# Patient Record
Sex: FEMALE | Race: WHITE | HISPANIC OR LATINO | Employment: OTHER | URBAN - METROPOLITAN AREA
[De-identification: names, ages, dates, MRNs, and addresses within clinical notes are randomized per-mention and may not be internally consistent; named-entity substitution may affect disease eponyms.]

---

## 2017-04-17 ENCOUNTER — OFFICE VISIT (OUTPATIENT)
Dept: INTERNAL MEDICINE | Facility: MEDICAL CENTER | Age: 57
End: 2017-04-17
Payer: MEDICAID

## 2017-04-17 VITALS
BODY MASS INDEX: 30.36 KG/M2 | TEMPERATURE: 98.3 F | HEART RATE: 83 BPM | SYSTOLIC BLOOD PRESSURE: 137 MMHG | DIASTOLIC BLOOD PRESSURE: 84 MMHG | OXYGEN SATURATION: 94 % | HEIGHT: 61 IN | WEIGHT: 160.8 LBS

## 2017-04-17 DIAGNOSIS — L65.9 HAIR LOSS: ICD-10-CM

## 2017-04-17 DIAGNOSIS — M75.02 ADHESIVE CAPSULITIS OF BOTH SHOULDERS: ICD-10-CM

## 2017-04-17 DIAGNOSIS — F39 MOOD DISORDER (HCC): ICD-10-CM

## 2017-04-17 DIAGNOSIS — R07.89 ATYPICAL CHEST PAIN: ICD-10-CM

## 2017-04-17 DIAGNOSIS — Z00.00 ROUTINE ADULT HEALTH MAINTENANCE: ICD-10-CM

## 2017-04-17 DIAGNOSIS — E78.5 DYSLIPIDEMIA: ICD-10-CM

## 2017-04-17 DIAGNOSIS — M75.01 ADHESIVE CAPSULITIS OF BOTH SHOULDERS: ICD-10-CM

## 2017-04-17 DIAGNOSIS — E11.8 TYPE 2 DIABETES MELLITUS WITH COMPLICATION, WITHOUT LONG-TERM CURRENT USE OF INSULIN (HCC): ICD-10-CM

## 2017-04-17 DIAGNOSIS — I10 ESSENTIAL HYPERTENSION: ICD-10-CM

## 2017-04-17 DIAGNOSIS — L20.89 FLEXURAL ATOPIC DERMATITIS: ICD-10-CM

## 2017-04-17 PROBLEM — L30.9 DERMATITIS: Status: ACTIVE | Noted: 2017-04-17

## 2017-04-17 PROBLEM — K29.50 CHRONIC GASTRITIS: Status: ACTIVE | Noted: 2017-04-17

## 2017-04-17 PROBLEM — E66.9 OBESITY: Status: ACTIVE | Noted: 2017-04-17

## 2017-04-17 PROBLEM — L20.9 ATOPIC DERMATITIS: Status: ACTIVE | Noted: 2017-04-17

## 2017-04-17 PROBLEM — F32.9 MAJOR DEPRESSIVE DISORDER: Status: ACTIVE | Noted: 2017-04-17

## 2017-04-17 PROBLEM — E11.9 TYPE 2 DIABETES MELLITUS (HCC): Status: ACTIVE | Noted: 2017-04-17

## 2017-04-17 PROBLEM — Z87.19 HISTORY OF GASTRITIS: Status: ACTIVE | Noted: 2017-04-17

## 2017-04-17 PROCEDURE — 99204 OFFICE O/P NEW MOD 45 MIN: CPT | Mod: GC | Performed by: INTERNAL MEDICINE

## 2017-04-17 RX ORDER — ATORVASTATIN CALCIUM 20 MG/1
20 TABLET, FILM COATED ORAL NIGHTLY
COMMUNITY
End: 2017-04-17 | Stop reason: SDUPTHER

## 2017-04-17 RX ORDER — CLOBETASOL PROPIONATE 0.5 MG/G
CREAM TOPICAL
Qty: 1 TUBE | Refills: 1 | Status: SHIPPED | OUTPATIENT
Start: 2017-04-17 | End: 2017-04-20 | Stop reason: CLARIF

## 2017-04-17 RX ORDER — ATORVASTATIN CALCIUM 20 MG/1
20 TABLET, FILM COATED ORAL DAILY
Qty: 30 TAB | Refills: 3 | Status: SHIPPED | OUTPATIENT
Start: 2017-04-17 | End: 2017-09-17 | Stop reason: SDUPTHER

## 2017-04-17 RX ORDER — METOPROLOL SUCCINATE 25 MG/1
25 TABLET, EXTENDED RELEASE ORAL DAILY
COMMUNITY
End: 2017-04-17 | Stop reason: SDUPTHER

## 2017-04-17 RX ORDER — ASPIRIN 81 MG/1
81 TABLET, CHEWABLE ORAL DAILY
COMMUNITY
End: 2017-04-17 | Stop reason: SDUPTHER

## 2017-04-17 RX ORDER — CETIRIZINE HYDROCHLORIDE 10 MG/1
10 TABLET ORAL DAILY
COMMUNITY
End: 2017-08-17

## 2017-04-17 RX ORDER — METOPROLOL SUCCINATE 25 MG/1
25 TABLET, EXTENDED RELEASE ORAL DAILY
Qty: 30 TAB | Refills: 3 | Status: ON HOLD | OUTPATIENT
Start: 2017-04-17 | End: 2018-04-25

## 2017-04-17 RX ORDER — ASPIRIN 81 MG/1
81 TABLET, CHEWABLE ORAL DAILY
Qty: 100 TAB | Refills: 3 | Status: SHIPPED | OUTPATIENT
Start: 2017-04-17 | End: 2017-10-24 | Stop reason: SDUPTHER

## 2017-04-17 RX ORDER — PANTOPRAZOLE SODIUM 40 MG/1
40 TABLET, DELAYED RELEASE ORAL DAILY
COMMUNITY
End: 2017-11-13

## 2017-04-17 RX ORDER — IBUPROFEN 800 MG/1
800 TABLET ORAL EVERY 8 HOURS PRN
COMMUNITY
End: 2017-05-31

## 2017-04-17 RX ORDER — ALBUTEROL SULFATE 90 UG/1
2 AEROSOL, METERED RESPIRATORY (INHALATION) EVERY 6 HOURS PRN
COMMUNITY
End: 2018-06-20 | Stop reason: SDUPTHER

## 2017-04-17 RX ORDER — LISINOPRIL 10 MG/1
10 TABLET ORAL DAILY
COMMUNITY
End: 2017-08-17

## 2017-04-17 ASSESSMENT — PAIN SCALES - GENERAL: PAINLEVEL: 7=MODERATE-SEVERE PAIN

## 2017-04-17 ASSESSMENT — PATIENT HEALTH QUESTIONNAIRE - PHQ9: CLINICAL INTERPRETATION OF PHQ2 SCORE: 0

## 2017-04-17 NOTE — PROGRESS NOTES
New Patient to Establish    Reason to establish: PCP switch    CC:  Unstable mood, hair loss, med refill    HPI:   A 55 yo F with PMHx of NIDDM2, HTN, DLD, came into clinic to establish care.  She recently moved from [place]. Last seen her PCP in Nov, 2016.     1. Mood disorder (CMS-HCC)  For past 4-5 months, fluctuating mood, depressed and anxious. Disturbed with sleep, appetite, gained a few lbs and having low energy. Tried xanax for a week previously, could not tolerate it. She partly contribute her mood symptoms to excessive hair loss, stressing her out. Willing to try medication. Denies suicidal ideation.     2. Hair loss  - diffuse, same duration as her mood symptom, 4-5 months.     3. Type 2 diabetes mellitus with complication, without long-term current use of insulin ([facility])  Last HbA1 C was said to be 7.6 six months ago. On janumet  mg QAM, metformin 1000 mg QAM and jardiance 25 mg QAM. Need refill of janumet.    4. Dyslipidemia  Need refill of atorvastatin      5. Essential hypertension  Office /80s. At home, usually runs between -150s, relatively controlled. On lisinopril 10 mg qd and metoprolol XR 25 mg qd.     6. Atypical chest pain  She had atypical chest pain, sharp left sided pain with ambulation. Saw cardiology in the past and work ups were said to be neg (stress test 2 yrs ago neg, cardiac monitor x 5 days was unremarkable)     7. Flexural atopic dermatitis  Has itchiness and rashes in her left forearm for years. Did not respond to hydrocortisone cream, currently on clobetasol with partial response.   Taking cetirizine for itchiness. Wears bracelets, tried taking them off for months without any relief.     8. Adhesive capsulitis of both shoulders  Currently full ROM. Reluctant to move around due to occasional pain.      ROS:   12 systems reviewed. Negative except stated above.     Patient Active Problem List    Diagnosis Date Noted   • Type 2 diabetes mellitus (HCC) 04/17/2017    • Essential hypertension 04/17/2017   • Dyslipidemia 04/17/2017   • History of gastritis 04/17/2017   • Atypical chest pain 04/17/2017   • Obesity 04/17/2017   • Mood disorder (CMS-HCC) 04/17/2017   • Atopic dermatitis 04/17/2017   • Hair loss 04/17/2017   • Adhesive capsulitis of both shoulders 04/17/2017       Past Medical History   Diagnosis Date   • Diabetes (CMS-HCC)    • Depression    • Cushings syndrome (CMS-HCC)    • Gastritis    • Atopic dermatitis        Current Outpatient Prescriptions   Medication Sig Dispense Refill   • lisinopril (PRINIVIL) 10 MG Tab Take 10 mg by mouth every day.     • albuterol 108 (90 BASE) MCG/ACT Aero Soln inhalation aerosol Inhale 2 Puffs by mouth every 6 hours as needed for Shortness of Breath.     • pantoprazole (PROTONIX) 40 MG Tablet Delayed Response Take 40 mg by mouth every day.     • metformin (GLUCOPHAGE) 500 MG Tab Take 500 mg by mouth 2 times a day, with meals.     • cetirizine (ZYRTEC) 10 MG Tab Take 10 mg by mouth every day.     • Empagliflozin (JARDIANCE) 25 MG Tab Take  by mouth.     • ibuprofen (MOTRIN) 800 MG Tab Take 800 mg by mouth every 8 hours as needed.     • sitagliptan-metformin (JANUMET)  MG per tablet Take 1 Tab by mouth every morning. 30 Tab 3   • metoprolol SR (TOPROL XL) 25 MG TABLET SR 24 HR Take 1 Tab by mouth every day. 30 Tab 3   • aspirin (ASA) 81 MG Chew Tab chewable tablet Take 1 Tab by mouth every day. 100 Tab 3   • atorvastatin (LIPITOR) 20 MG Tab Take 1 Tab by mouth every day. 30 Tab 3   • clobetasol (TEMOVATE) 0.05 % Cream Apply to itchy area at left forearm twice daily. 1 Tube 1     No current facility-administered medications for this visit.       Allergies as of 04/17/2017   • (No Known Allergies)       Social History     Social History   • Marital Status: Legally      Spouse Name: N/A   • Number of Children: N/A   • Years of Education: N/A     Occupational History   • Not on file.     Social History Main Topics   •  "Smoking status: Former Smoker -- 1.00 packs/day for 15 years     Quit date: 04/17/2002   • Smokeless tobacco: Never Used   • Alcohol Use: No      Comment: 1 beer / month   • Drug Use: No   • Sexual Activity: Not on file      Comment: menopause 2015     Other Topics Concern   • Not on file     Social History Narrative   • No narrative on file       Family History   Problem Relation Age of Onset   • Cancer Mother      lung cancer, chronic smoker   • Lung Disease Mother    • Diabetes Father        Past Surgical History   Procedure Laterality Date   • Cholecystectomy  2007   • Other orthopedic surgery  2007     shoulder surgery         /84 mmHg  Pulse 83  Temp(Src) 36.8 °C (98.3 °F)  Ht 1.549 m (5' 0.98\")  Wt 72.938 kg (160 lb 12.8 oz)  BMI 30.40 kg/m2  SpO2 94%  Breastfeeding? No    Physical Exam  General: Alert and oriented, No apparent distress.  Eyes: Pupils are equal and reactive. No scleral icterus.  Throat: Clear no erythema or exudates noted.  Neck: Supple. No lymphadenopathy noted. Thyroid not enlarged.  Lungs: Clear to auscultation bilaterally without any wheezing, crepitations.  Cardiovascular: Regular rate and rhythm. No murmurs, rubs or gallops.  Abdomen: Bowel sound +, soft, non tender, no rebound or guarding, no palpable organomegaly  Extremities: No clubbing, cyanosis, edema.  Skin: 1 cm raised chronic appearing erythematous lesion just above flexor surface of left wrist.  Neuro: A & O x 3. Normal speech and memory. Motor and sensory grossly normal.     Assessment and Plan    1. Mood disorder (CMS-HCC)  For past 4-5 months, fluctuating mood, depressed and anxious. Disturbed with sleep, appetite, gained a few lbs and having low energy. Tried xanax for a week previously, could not tolerate it. She partly contribute her mood symptoms to excessive hair loss, stressing her out. Willing to try medication. Denies suicidal ideation.   - explained her that we'll rule out treatable medical condition " first (especially with hair loss) and will have her follow up to consider behavioral therapy or medication in 2 weeks.   - TSH WITH REFLEX TO FT4; Future    2. Hair loss  - same duration as her mood symptom, 4-5 months, generalized.   - TSH WITH REFLEX TO FT4; Future    3. Type 2 diabetes mellitus with complication, without long-term current use of insulin (HCC)  Last HbA1 C was said to be 7.6 six months ago. On janumet  mg QAM, metformin 1000 mg QAM and jardiance 25 mg QAM. Need refill of janumet, refilled. Continue current therapy and recheck Hb A 1C.  - MICROALBUMIN CREAT RATIO URINE; Future  - HEMOGLOBIN A1C; Future    4. Dyslipidemia  Need refill of atorvastatin  - ordered LIPID PROFILE and plan increase to mod-high intensity as tolerated.  - refilled atorvastatin (LIPITOR) 20 MG Tab; Take 1 Tab by mouth every day.  Dispense: 30 Tab; Refill: 3    5. Essential hypertension  Office /80s. At home, usually runs between -150s, relatively controlled.On lisinopril 10 mg qd and metoprolol XR 25 mg qd.   - Continue current therapy.  - refilled metoprolol SR (TOPROL XL) 25 MG TABLET SR 24 HR; Take 1 Tab by mouth every day.  Dispense: 30 Tab; Refill: 3    6. Atypical chest pain  She had atypical chest pain, sharp left sided pain with ambulation. Saw cardiology in the past and work ups were said to be neg (stress test 2 yrs ago neg, cardiac monitor x 5 days was unremarkable)   - If the nature of symptoms change, will repeat the work up. Monitor for now.  - aspirin (ASA) 81 MG Chew Tab chewable tablet; Take 1 Tab by mouth every day.  Dispense: 100 Tab; Refill: 3    7. Flexural atopic dermatitis  Has itchiness and rashes in her left forearm for years. Did not respond to hydrocortisone cream, currently on clobetasol with partial response.   Taking cetirizine for itchiness. Wears bracelets, tried taking them off for months without any relief.   Exam: 1 cm raised chronic appearing erythematous lesion just  above flexor surface of left wrist.  - counseled not to wear bracelets on affected side since it will increase irritation.   - refill clobetasol (TEMOVATE) 0.05 % Cream; Apply to itchy area at left forearm twice daily.  Dispense: 1 Tube; Refill: 1    8. Adhesive capsulitis of both shoulders  Currently full ROM. Reluctant to move around due to occasional pain.  - Recommended exercise to keep mobility. Instructions for exercise given.     9. Routine adult health maintenance    Preventive care  Flu - NA  TD- will update in next visit.  TDaP - NA  Pneumococcal - will update in next visit.  Prevnar - NA  Colonoscopy - 2 years ago, said to be normal except for hemorroids  Zostavax- NA     Women only  Pap - 2/2017, said to be normal. Followed by gyne.  Mammogram - 2/2017, said to be normal. Followed by gyne.  Dexa - not due.     - CBC WITH DIFFERENTIAL; Future  - COMP METABOLIC PANEL; Future      Followup: Return in about 2 weeks (around 5/1/2017).      Signed by: Lola Gomez M.D.

## 2017-04-17 NOTE — MR AVS SNAPSHOT
"        Shahida Price   2017 8:45 AM   Office Visit   MRN: [id number]    Department:  [facility] Med - Internal Med   Dept Phone:  [phone]    Description:  Female : 1960   Provider:  Lola Gomez M.D.           Reason for Visit     New Patient DM       Allergies as of 2017     No Known Allergies      You were diagnosed with     Mild single current episode of major depressive disorder (CMS-HCC)   [8869053]       Type 2 diabetes mellitus with complication, without long-term current use of insulin (CMS-HCC)   [5768275]       Essential hypertension   [8731780]       Dyslipidemia   [183903]       Chronic gastritis without bleeding, unspecified gastritis type   [3874823]       Atypical chest pain   [899030]       Hair loss   [898649]       Flexural atopic dermatitis   [451618]       Routine adult health maintenance   [918393]       Adhesive capsulitis of both shoulders   [2019409]         Vital Signs     Blood Pressure Pulse Temperature Height Weight Body Mass Index    137/84 mmHg 83 36.8 °C (98.3 °F) 1.549 m (5' 0.98\") 72.938 kg (160 lb 12.8 oz) 30.40 kg/m2    Oxygen Saturation Breastfeeding? Smoking Status             94% No Never Smoker          Basic Information     Date Of Birth Sex Race Ethnicity Preferred Language    1960 Female Patient Refused  Origin (Czech,Israeli,Moldovan,Chase, etc) Czech      Your appointments     May 05, 2017  9:00 AM   Established Patient with Annie Contreras M.D.   [facility] - Internal Medicine (--)    [address] [place] [id number] [phone]           You will be receiving a confirmation call a few days before your appointment from our automated call confirmation system.              Problem List              ICD-10-CM Priority Class Noted - Resolved    Type 2 diabetes mellitus (HCC) E11.9   2017 - Present    Essential hypertension I10   2017 - Present    Dyslipidemia E78.5   2017 - Present    Chronic " gastritis K29.50   4/17/2017 - Present    Atypical chest pain R07.89   4/17/2017 - Present    Obesity E66.9   4/17/2017 - Present    Dermatitis L30.9   4/17/2017 - Present    Major depressive disorder F32.9   4/17/2017 - Present    Atopic dermatitis L20.9   4/17/2017 - Present    Hair loss L65.9   4/17/2017 - Present    Adhesive capsulitis of both shoulders M75.01, M75.02   4/17/2017 - Present      Health Maintenance     Patient has no pending health maintenance at this time      Current Immunizations     No immunizations on file.      Below and/or attached are the medications your provider expects you to take. Review all of your home medications and newly ordered medications with your provider and/or pharmacist. Follow medication instructions as directed by your provider and/or pharmacist. Please keep your medication list with you and share with your provider. Update the information when medications are discontinued, doses are changed, or new medications (including over-the-counter products) are added; and carry medication information at all times in the event of emergency situations     Allergies:  No Known Allergies          Medications  Valid as of: April 17, 2017 - 10:33 AM    Generic Name Brand Name Tablet Size Instructions for use    Albuterol Sulfate (Aero Soln) albuterol 108 (90 BASE) MCG/ACT Inhale 2 Puffs by mouth every 6 hours as needed for Shortness of Breath.        Aspirin (Chew Tab) ASA 81 MG Take 1 Tab by mouth every day.        Atorvastatin Calcium (Tab) LIPITOR 20 MG Take 1 Tab by mouth every day.        Cetirizine HCl (Tab) ZYRTEC 10 MG Take 10 mg by mouth every day.        Clobetasol Propionate (Cream) TEMOVATE 0.05 % Apply to itchy area at left forearm twice daily.        Empagliflozin (Tab) Empagliflozin 25 MG Take  by mouth.        Ibuprofen (Tab) MOTRIN 800 MG Take 800 mg by mouth every 8 hours as needed.        Lisinopril (Tab) PRINIVIL 10 MG Take 10 mg by mouth every day.        MetFORMIN  HCl (Tab) GLUCOPHAGE 500 MG Take 500 mg by mouth 2 times a day, with meals.        Metoprolol Succinate (TABLET SR 24 HR) TOPROL XL 25 MG Take 1 Tab by mouth every day.        Pantoprazole Sodium (Tablet Delayed Response) PROTONIX 40 MG Take 40 mg by mouth every day.        SITagliptin-MetFORMIN HCl (Tab) JANUMET  MG Take 1 Tab by mouth every morning.        .                 Medicines prescribed today were sent to:     [facility] [address] [place] [address] [facility]    [address] [facility] [place] [address]    Phone: [phone] Fax: [phone]    Open 24 Hours?: No      Medication refill instructions:       If your prescription bottle indicates you have medication refills left, it is not necessary to call your provider’s office. Please contact your pharmacy and they will refill your medication.    If your prescription bottle indicates you do not have any refills left, you may request refills at any time through one of the following ways: The online [employer] system (except Urgent Care), by calling your provider’s office, or by asking your pharmacy to contact your provider’s office with a refill request. Medication refills are processed only during regular business hours and may not be available until the next business day. Your provider may request additional information or to have a follow-up visit with you prior to refilling your medication.   *Please Note: Medication refills are assigned a new Rx number when refilled electronically. Your pharmacy may indicate that no refills were authorized even though a new prescription for the same medication is available at the pharmacy. Please request the medicine by name with the pharmacy before contacting your provider for a refill.        Your To Do List     Future Labs/Procedures Complete By Expires    CBC WITH DIFFERENTIAL  As directed 4/18/2018    COMP METABOLIC PANEL  As directed 4/18/2018    HEMOGLOBIN A1C   "As directed 4/18/2018    LIPID PROFILE  As directed 4/18/2018    MICROALBUMIN CREAT RATIO URINE  As directed 4/18/2018    TSH WITH REFLEX TO FT4  As directed 4/17/2018      Instructions    1. Return in about 2 weeks (around 5/1/2017).     .Capsulitis adhesiva   (Adhesive Capsulitis)   A veces, el hombro se vuelve más rígido y duele al moverlo. Algunas personas dicen que se siente juan si el hombro estuviese congelado en el lugar. Debido a esto, la enfermedad se llama \"hombro congelado\". Kang nombre médico es capsulitis adhesiva.   La articulación del hombro está formada por un antoinette tejido conectivo que une la carl del húmero en la cavidad hueca del hombro. Dulce tejido conectivo antoinette se llama cápsula articular. Dulce tejido puede volverse rígido e hinchado. Es entonces cuando se produce la capsulitis adhesiva.   CAUSAS   No siempre está darnell qué es lo que ocasiona la capsulitis adhesiva. Las posibilidades incluyen:   · Lesión de la articulación del hombro.  · Distensión. Se trata de kaur lesión repetitiva provocada por el uso excesivo.  · Falta de uso. Kaur lesión del brazo o de la mano. Polo usado latrice tiempo un cabestrillo. O eddi vez no haberlo utilizado para evitar el dolor.  · Dolor localizado. Esta es kaur especie de trampa que hace el cuerpo. Siente dolor en el hombro. Sue, el dolor en realidad proviene de kaur lesión de otra parte del cuerpo.  · Otros problemas de harjit crónicos. Varias enfermedades pueden causar capsulitis adhesiva. Entre ellas se encuentran la diabetes, las enfermedades cardíacas, ictus, problemas de tiroides, la artritis reumatoidea y enfermedad pulmonar.  · Ser kaur johnson mayor de 40 años. Cualquier persona puede desarrollar capsulitis adhesiva, sue es más común en las mujeres en dulce eugenia de edad.  SÍNTOMAS   · Dolor.  · Se produce al  el brazo.  · Kaur parte del hombro puede doler al tocarla.  · El dolor es peor en la noche o cuando está descansando.  · Malestar. Puede que no sea " lo suficientemente antoinette juan para ser llamado dolor. Sue el hombro siente molestias.  · El hombro no se mueve libremente.  · Espasmos musculares.  · Dificultad para dormir debido a molestias o dolor en el hombro.  DIAGNÓSTICO   Para diagnosticar la capsulitis adhesiva, el médico hará lo siguiente:   · Preguntará sobre los síntomas que haya observado.  · Preguntará acerca de christie historia de dolor en las articulaciones y cualquier cosa que pudiera marilyn causado el dolor.  · Preguntará acerca de christie harjit en general.  · Utilizará las chetan para palpar el hombro y el wicho.  · Le pedirá que lo mueva en direcciones específicas. [place] podrá indicar el origen del dolor.  · Podrá pedir un diagnóstico por imágenes, imágenes del hombro. Estas ayudan a identificar la pennie del problema. Podrán utilizarse rc X. Para más detalles, a menudo se solicita eleuterio resonancia magnética. Eleuterio resonancia magnética muestras los tendones, músculos y ligamentos, así juan las articulaciones.  TRATAMIENTO   La capsulitis adhesiva puede tratarse de varias maneras. La mayoría de las veces, el tratamiento se puede realizar en eleuterio clínica o en el consultorio de un médico. Asegúrese de comentar las diferentes opciones con christie médico. Ellos son:   · Fisioterapia. Trabajar en ejercicios específicos para recuperar la movilidad del hombro. Los ejercicios suelen involucrar estiramientos. Un fisioterapeuta (un personal de harjit con capacitación especial) puede enseñarle qué hacer y qué no hacer. Deberá realizar estos ejercicios todos los días.  · Medicamentos.  · Medicamentos de venta antonia para aliviar el dolor y la inflamación (la forma del cuerpo de reaccionar a eleuterio lesión o infección).  · Corticoides. Estos son medicamentos más [place] para reducir el dolor y la inflamación. Se administran por inyección (vacunas) en la articulación del hombro. No se recomienda el tratamiento frecuente.  · Relajantes musculares. Podrán recetarle medicamentos para  aliviar los espasmos musculares.  · Tratamiento de las enfermedades subyacentes. [place] significa tratar otra afección que está causando christie problema del hombro. Paola podría ser un problema del manguito rotador (tendón).  · Manipulación del hombro. El médico podrá realizar movimientos del hombro. Usted estará bajo anestesia general (se le administrará eleuterio droga para dormirlo). No sentirá nada. A veces se inyecta agua salada (solución salina) en la articulación a kelly presión para romper la cicatrización interna en la cápsula de la articulación.  · Cirugía. Violet vez se indica cirugía. Se puede sugerir, en casos avanzados, después de que todo otro tratamiento ha fracasado.  PRONÓSTICO   Con el tiempo, la mayoría de las personas se recupera de la capsulitis adhesiva. A veces, sin embargo, el dolor desaparece, nael no se puede recuperar el movimiento completo.   INSTRUCCIONES PARA EL CUIDADO EN EL HOGAR   · [place] los medicamentos para el dolor recomendados por christie médico. Siga cuidadosamente las indicaciones.  · Si realiza eleuterio terapia física, siga las sugerencias del terapeuta. Asegúrese de entender los ejercicios que va a hacer. Usted debe comprender:  · Con qué frecuencia se deben realizar los ejercicios.  · Cuántas veces se debe repetir cada ejercicio.  · Cuánto tiempo se deben hacer.  · Qué otras actividades debe hacer o no hacer.  · Deberá realizar un calentamiento antes de hacer cualquier ejercicio. Unos 5 o 10 minutos son suficientes. Movimientos suaves para preparar christie hombros para más movimientos.  · Evite ejercicios que involucren demasiado el hombro, juan el lanzamiento. Paola tipo de ejercicio puede hacer que el dolor empeore.  · Considere utilizar compresas frías. El frío puede aliviar la hinchazón y el dolor. Pregunte a christie médico si eleuterio compresa fría puede ser útil. Si es así, busque instrucciones sobre cómo y cuándo usarlas.  SOLICITE ATENCIÓN MÉDICA SI:   · Tiene dudas relacionadas con los medicamentos.  · El  dolor aumenta.     Esta información no tiene juan fin reemplazar el consejo del médico. Asegúrese de hacerle al médico cualquier pregunta que tenga.     Document Released: 04/14/2014  [employer] Interactive Patient Education ©2016 Elsevier Inc.    Eczema  (Eczema)  El eczema, también llamada dermatitis atópica, es eleuterio afección de la piel que causa inflamación de la misma. Dulce trastorno produce eleuterio erupción elver y sequedad y escamas en la piel. Hay gran picazón. El eczema generalmente empeora servando los meses fríos del invierno y generalmente desaparece o mejora con el tiempo cálido del verano. El eczema generalmente comienza a manifestarse en la infancia. Algunos niños desarrollan dulce trastorno y éste puede prolongarse en la adultez.   CAUSAS   La causa exacta no se conoce nael parece ser eleuterio afección hereditaria. Generalmente las personas que sufren eczema tienen eleuterio historia familiar de eczema, alergias, asma o fiebre de heno. Esta enfermedad no es contagiosa.  Algunas causas de los brotes pueden ser:   · Contacto con alguna cosa a la que es sensible o alérgico.  · Estrés.  SIGNOS Y SÍNTOMAS  · Piel seca y escamosa.  · Erupción elver y que pica.  · Picazón. Esta puede ocurrir antes de que aparezca la erupción y puede ser muy intensa.  DIAGNÓSTICO   El diagnóstico de eczema se realiza basándose en los síntomas y en la historia clínica.  TRATAMIENTO   El eczema no puede curarse, nael los síntomas generalmente pueden controlarse con tratamiento y otras estrategias. Un plan de tratamiento puede incluir:  · Control de la picazón y el rascado.  ¨ Utilice antihistamínicos de venta antonia según las indicaciones, para aliviar la picazón. Es especialmente útil por las noches cuando la picazón tiende a empeorar.  ¨ Utilice medicamentos de venta antonia para la picazón, según las indicaciones del médico.  ¨ Evite rascarse. El rascado hace que la picazón empeore. También puede producir eleuterio infección en la piel (impétigo) debido a  las lesiones en la piel causadas por el rascado.  · Mantenga la piel samara humectada con cremas, todos los grisel. La piel quedará húmeda y ayudará a prevenir la sequedad. Las lociones que contengan alcohol y agua deben evitarse debido a que pueden secar la piel.  · Limite la exposición a las cosas a las que es sensible o alérgico (alérgenos).  · Reconozca las situaciones que puedan causar estrés.  · Desarrolle un plan para controlar el estrés.  INSTRUCCIONES PARA EL CUIDADO EN EL HOGAR   · [place] sólo medicamentos de venta antonia o recetados, según las indicaciones del médico.  · No aplique nada sobre la piel sin consultar a christie médico.  · Deberá alessandro balwinder o duchas de corta duración (5 minutos) en agua tibia (no caliente). Use jabones suaves para el baño. No deben tener perfume. Puede agregar aceite de baño no perfumado al agua del baño. Es mejor evitar el jabón y el baño de espuma.  · Inmediatamente después del baño o de la ducha, cuando la piel aun está húmeda, aplique eleuterio crema humectante en todo el cuerpo. Paola ungüento debe ser en base a vaselina. La piel quedará húmeda y ayudará a prevenir la sequedad. Cuanto más espeso sea el ungüento, mejor. No deben tener perfume.  · Mantenga las uñas cortas. Es posible que los niños con eczema necesiten usar guantes o mitones por la noche, después de aplicarse el ungüento.  · [place] al tati con ropa de algodón o mezcla de algodón. Vístalo con ropas ligeras ya que el calor aumenta la picazón.  · Un tati con eczema debe permanecer alejado de personas que tengan ampollas febriles o llagas del resfrío. El virus que causa las ampollas febriles (herpes simple) puede ocasionar eleuterio infección grave en la piel de los niños que padecen eczema.  SOLICITE ATENCIÓN MÉDICA SI:   · La picazón le impide dormir.  · La erupción empeora o no mejora dentro de la semana en la que se inicia el tratamiento.  · Observa pus o costras eva en la natasha de la erupción.  · Tiene fiebre.  · Aparece un  brote después de marilyn estado en contacto con alguna persona que tiene ampollas febriles.     Esta información no tiene juan fin reemplazar el consejo del médico. Asegúrese de hacerle al médico cualquier pregunta que tenga.     Document Released: 12/18/2006 Document Revised: 10/08/2014  [employer] Interactive Patient Education ©2016 Elsevier Inc.                TennisHub Access Code: [email or web address]  Expires: 5/17/2017 10:33 AM    TennisHub  A secure, online tool to manage your health information     [employer]’s TennisHub® is a secure, online tool that connects you to your personalized health information from the privacy of your home -- day or night - making it very easy for you to manage your healthcare. Once the activation process is completed, you can even access your medical information using the TennisHub satish, which is available for free in the Apple Satish store or Google Play store.     TennisHub provides the following levels of access (as shown below):   My Chart Features   [facility] Doctor [facility]  Specialists [facility]  Urgent  Care [facility]  Primary Care  Doctor   Email your healthcare team securely and privately 24/7 X X X    Manage appointments: schedule your next appointment; view details of past/upcoming appointments X      Request prescription refills. X      View recent personal medical records, including lab and immunizations X X X X   View health record, including health history, allergies, medications X X X X   Read reports about your outpatient visits, procedures, consult and ER notes X X X X   See your discharge summary, which is a recap of your hospital and/or ER visit that includes your diagnosis, lab results, and care plan. X X       How to register for TennisHub:  1. Go to  [email or web address].  2. Click on the Sign Up Now box, which takes you to the New Member Sign Up page. You will need to provide the following information:  a. Enter your TennisHub Access Code exactly as it appears at  the top of this page. (You will not need to use this code after you’ve completed the sign-up process. If you do not sign up before the expiration date, you must request a new code.)   b. Enter your date of birth.   c. Enter your home email address.   d. Click Submit, and follow the next screen’s instructions.  3. Create a ClearCycle ID. This will be your ClearCycle login ID and cannot be changed, so think of one that is secure and easy to remember.  4. Create a ClearCycle password. You can change your password at any time.  5. Enter your Password Reset Question and Answer. This can be used at a later time if you forget your password.   6. Enter your e-mail address. This allows you to receive e-mail notifications when new information is available in ClearCycle.  7. Click Sign Up. You can now view your health information.    For assistance activating your ClearCycle account, call [phone]

## 2017-04-17 NOTE — PATIENT INSTRUCTIONS
"1. Return in about 2 weeks (around 5/1/2017).     .Capsulitis adhesiva   (Adhesive Capsulitis)   A veces, el hombro se vuelve más rígido y duele al moverlo. Algunas personas dicen que se siente juan si el hombro estuviese congelado en el lugar. Debido a esto, la enfermedad se llama \"hombro congelado\". Kang nombre médico es capsulitis adhesiva.   La articulación del hombro está formada por un antoinette tejido conectivo que une la carl del húmero en la cavidad hueca del hombro. Dulce tejido conectivo antoinette se llama cápsula articular. Dulce tejido puede volverse rígido e hinchado. Es entonces cuando se produce la capsulitis adhesiva.   CAUSAS   No siempre está darnell qué es lo que ocasiona la capsulitis adhesiva. Las posibilidades incluyen:   · Lesión de la articulación del hombro.  · Distensión. Se trata de kaur lesión repetitiva provocada por el uso excesivo.  · Falta de uso. Kaur lesión del brazo o de la mano. Polo usado latrice tiempo un cabestrillo. O eddi vez no haberlo utilizado para evitar el dolor.  · Dolor localizado. Esta es kaur especie de trampa que hace el cuerpo. Siente dolor en el hombro. Sue, el dolor en realidad proviene de kaur lesión de otra parte del cuerpo.  · Otros problemas de harjit crónicos. Varias enfermedades pueden causar capsulitis adhesiva. Entre ellas se encuentran la diabetes, las enfermedades cardíacas, ictus, problemas de tiroides, la artritis reumatoidea y enfermedad pulmonar.  · Ser kaur johnson mayor de 40 años. Cualquier persona puede desarrollar capsulitis adhesiva, sue es más común en las mujeres en dulce eugenia de edad.  SÍNTOMAS   · Dolor.  · Se produce al  el brazo.  · Kaur parte del hombro puede doler al tocarla.  · El dolor es peor en la noche o cuando está descansando.  · Malestar. Puede que no sea lo suficientemente antoinette juan para ser llamado dolor. Sue el hombro siente molestias.  · El hombro no se mueve libremente.  · Espasmos musculares.  · Dificultad para dormir debido a " molestias o dolor en el hombro.  DIAGNÓSTICO   Para diagnosticar la capsulitis adhesiva, el médico hará lo siguiente:   · Preguntará sobre los síntomas que haya observado.  · Preguntará acerca de christie historia de dolor en las articulaciones y cualquier cosa que pudiera marilyn causado el dolor.  · Preguntará acerca de christie harjit en general.  · Utilizará las chetan para palpar el hombro y el wicho.  · Le pedirá que lo mueva en direcciones específicas. [place] podrá indicar el origen del dolor.  · Podrá pedir un diagnóstico por imágenes, imágenes del hombro. Estas ayudan a identificar la pennie del problema. Podrán utilizarse rc X. Para más detalles, a menudo se solicita eleuterio resonancia magnética. Eleuterio resonancia magnética muestras los tendones, músculos y ligamentos, así juan las articulaciones.  TRATAMIENTO   La capsulitis adhesiva puede tratarse de varias maneras. La mayoría de las veces, el tratamiento se puede realizar en eleuterio clínica o en el consultorio de un médico. Asegúrese de comentar las diferentes opciones con christie médico. Ellos son:   · Fisioterapia. Trabajar en ejercicios específicos para recuperar la movilidad del hombro. Los ejercicios suelen involucrar estiramientos. Un fisioterapeuta (un personal de harjit con capacitación especial) puede enseñarle qué hacer y qué no hacer. Deberá realizar estos ejercicios todos los días.  · Medicamentos.  · Medicamentos de venta antonia para aliviar el dolor y la inflamación (la forma del cuerpo de reaccionar a eleuterio lesión o infección).  · Corticoides. Estos son medicamentos más [place] para reducir el dolor y la inflamación. Se administran por inyección (vacunas) en la articulación del hombro. No se recomienda el tratamiento frecuente.  · Relajantes musculares. Podrán recetarle medicamentos para aliviar los espasmos musculares.  · Tratamiento de las enfermedades subyacentes. [place] significa tratar otra afección que está causando christie problema del hombro. [place] podría ser un problema  del manguito rotador (tendón).  · Manipulación del hombro. El médico podrá realizar movimientos del hombro. Usted estará bajo anestesia general (se le administrará eleuterio droga para dormirlo). No sentirá nada. A veces se inyecta agua salada (solución salina) en la articulación a kelly presión para romper la cicatrización interna en la cápsula de la articulación.  · Cirugía. Violet vez se indica cirugía. Se puede sugerir, en casos avanzados, después de que todo otro tratamiento ha fracasado.  PRONÓSTICO   Con el tiempo, la mayoría de las personas se recupera de la capsulitis adhesiva. A veces, sin embargo, el dolor desaparece, nael no se puede recuperar el movimiento completo.   INSTRUCCIONES PARA EL CUIDADO EN EL HOGAR   · Surrency los medicamentos para el dolor recomendados por christie médico. Siga cuidadosamente las indicaciones.  · Si realiza eleuterio terapia física, siga las sugerencias del terapeuta. Asegúrese de entender los ejercicios que va a hacer. Usted debe comprender:  · Con qué frecuencia se deben realizar los ejercicios.  · Cuántas veces se debe repetir cada ejercicio.  · Cuánto tiempo se deben hacer.  · Qué otras actividades debe hacer o no hacer.  · Deberá realizar un calentamiento antes de hacer cualquier ejercicio. Unos 5 o 10 minutos son suficientes. Movimientos suaves para preparar christie hombros para más movimientos.  · Evite ejercicios que involucren demasiado el hombro, juan el lanzamiento. [place] tipo de ejercicio puede hacer que el dolor empeore.  · Considere utilizar compresas frías. El frío puede aliviar la hinchazón y el dolor. Pregunte a christie médico si eleuterio compresa fría puede ser útil. Si es así, busque instrucciones sobre cómo y cuándo usarlas.  SOLICITE ATENCIÓN MÉDICA SI:   · Tiene dudas relacionadas con los medicamentos.  · El dolor aumenta.     Esta información no tiene juan fin reemplazar el consejo del médico. Asegúrese de hacerle al médico cualquier pregunta que tenga.     Document Released:  04/14/2014  [employer] Interactive Patient Education ©2016 Elsevier Inc.    Eczema  (Eczema)  El eczema, también llamada dermatitis atópica, es eleuterio afección de la piel que causa inflamación de la misma. Dulce trastorno produce eleuterio erupción elver y sequedad y escamas en la piel. Hay gran picazón. El eczema generalmente empeora servando los meses fríos del invierno y generalmente desaparece o mejora con el tiempo cálido del verano. El eczema generalmente comienza a manifestarse en la infancia. Algunos niños desarrollan dulce trastorno y éste puede prolongarse en la adultez.   CAUSAS   La causa exacta no se conoce nael parece ser eleuterio afección hereditaria. Generalmente las personas que sufren eczema tienen eleuterio historia familiar de eczema, alergias, asma o fiebre de heno. Esta enfermedad no es contagiosa.  Algunas causas de los brotes pueden ser:   · Contacto con alguna cosa a la que es sensible o alérgico.  · Estrés.  SIGNOS Y SÍNTOMAS  · Piel seca y escamosa.  · Erupción elver y que pica.  · Picazón. Esta puede ocurrir antes de que aparezca la erupción y puede ser muy intensa.  DIAGNÓSTICO   El diagnóstico de eczema se realiza basándose en los síntomas y en la historia clínica.  TRATAMIENTO   El eczema no puede curarse, nael los síntomas generalmente pueden controlarse con tratamiento y otras estrategias. Un plan de tratamiento puede incluir:  · Control de la picazón y el rascado.  ¨ Utilice antihistamínicos de venta antonia según las indicaciones, para aliviar la picazón. Es especialmente útil por las noches cuando la picazón tiende a empeorar.  ¨ Utilice medicamentos de venta antonia para la picazón, según las indicaciones del médico.  ¨ Evite rascarse. El rascado hace que la picazón empeore. También puede producir eleuterio infección en la piel (impétigo) debido a las lesiones en la piel causadas por el rascado.  · Mantenga la piel samara humectada con cremas, todos los grisel. La piel quedará húmeda y ayudará a prevenir la sequedad.  Las lociones que contengan alcohol y agua deben evitarse debido a que pueden secar la piel.  · Limite la exposición a las cosas a las que es sensible o alérgico (alérgenos).  · Reconozca las situaciones que puedan causar estrés.  · Desarrolle un plan para controlar el estrés.  INSTRUCCIONES PARA EL CUIDADO EN EL HOGAR   · [place] sólo medicamentos de venta antonia o recetados, según las indicaciones del médico.  · No aplique nada sobre la piel sin consultar a christie médico.  · Deberá alessandro balwinder o duchas de corta duración (5 minutos) en agua tibia (no caliente). Use jabones suaves para el baño. No deben tener perfume. Puede agregar aceite de baño no perfumado al agua del baño. Es mejor evitar el jabón y el baño de espuma.  · Inmediatamente después del baño o de la ducha, cuando la piel aun está húmeda, aplique eleuterio crema humectante en todo el cuerpo. [place] ungüento debe ser en base a vaselina. La piel quedará húmeda y ayudará a prevenir la sequedad. Cuanto más espeso sea el ungüento, mejor. No deben tener perfume.  · Mantenga las uñas cortas. Es posible que los niños con eczema necesiten usar guantes o mitones por la noche, después de aplicarse el ungüento.  · [place] al tati con ropa de algodón o mezcla de algodón. Vístalo con ropas ligeras ya que el calor aumenta la picazón.  · Un tati con eczema debe permanecer alejado de personas que tengan ampollas febriles o llagas del resfrío. El virus que causa las ampollas febriles (herpes simple) puede ocasionar eleuterio infección grave en la piel de los niños que padecen eczema.  SOLICITE ATENCIÓN MÉDICA SI:   · La picazón le impide dormir.  · La erupción empeora o no mejora dentro de la semana en la que se inicia el tratamiento.  · Observa pus o costras eva en la natasha de la erupción.  · Tiene fiebre.  · Aparece un brote después de marilyn estado en contacto con alguna persona que tiene ampollas febriles.     Esta información no tiene juan fin reemplazar el consejo del médico.  Asegúrese de hacerle al médico cualquier pregunta que tenga.     Document Released: 12/18/2006 Document Revised: 10/08/2014  Elsevier Interactive Patient Education ©2016 Elsevier Inc.

## 2017-04-19 ENCOUNTER — TELEPHONE (OUTPATIENT)
Dept: INTERNAL MEDICINE | Facility: MEDICAL CENTER | Age: 57
End: 2017-04-19

## 2017-04-19 DIAGNOSIS — E11.8 TYPE 2 DIABETES MELLITUS WITH COMPLICATION, WITHOUT LONG-TERM CURRENT USE OF INSULIN (HCC): ICD-10-CM

## 2017-04-19 DIAGNOSIS — L20.89 FLEXURAL ATOPIC DERMATITIS: ICD-10-CM

## 2017-04-19 RX ORDER — BETAMETHASONE DIPROPIONATE 0.5 MG/G
CREAM TOPICAL
Qty: 1 TUBE | Refills: 1 | Status: SHIPPED | OUTPATIENT
Start: 2017-04-19 | End: 2017-05-31

## 2017-04-19 NOTE — TELEPHONE ENCOUNTER
Clobetasol denied by insurance, patient need trial of preferred medication. Please see scan from Insurance company and change.

## 2017-04-19 NOTE — TELEPHONE ENCOUNTER
DOCUMENTATION OF PAR STATUS:    1. Name of Medication & Dose: Janumet 50/100 1 po qd and Clobetasol prop 0.05% cream apply BID      2. Name of Prescription Coverage Company & phone #: [employer]     3. Date Prior Auth Submitted: 4/18/17    4. What information was given to obtain insurance decision? See PAR form filled out by Dr. Gomez     5. Prior Auth Letter Approved or Denied? Pending     6. Action Taken: Pharmacy/Patient Notified: Not yet

## 2017-04-20 RX ORDER — METFORMIN HYDROCHLORIDE 500 MG/1
500 TABLET, EXTENDED RELEASE ORAL 2 TIMES DAILY
Qty: 30 TAB | COMMUNITY
Start: 2017-04-20 | End: 2017-11-13

## 2017-04-20 RX ORDER — ALOGLIPTIN AND METFORMIN HYDROCHLORIDE 12.5; 1 MG/1; MG/1
1 TABLET, FILM COATED ORAL EVERY MORNING
Qty: 30 TAB | Refills: 3 | Status: SHIPPED | OUTPATIENT
Start: 2017-04-20 | End: 2017-05-31

## 2017-04-20 NOTE — TELEPHONE ENCOUNTER
Called patient and let her know that I ordered a substitute cream with similar potency since her insurance won't approve clobetasol.

## 2017-05-05 ENCOUNTER — APPOINTMENT (OUTPATIENT)
Dept: INTERNAL MEDICINE | Facility: MEDICAL CENTER | Age: 57
End: 2017-05-05
Payer: MEDICAID

## 2017-05-31 ENCOUNTER — APPOINTMENT (OUTPATIENT)
Dept: RADIOLOGY | Facility: MEDICAL CENTER | Age: 57
End: 2017-05-31
Attending: EMERGENCY MEDICINE
Payer: MEDICAID

## 2017-05-31 ENCOUNTER — HOSPITAL ENCOUNTER (EMERGENCY)
Facility: MEDICAL CENTER | Age: 57
End: 2017-05-31
Attending: EMERGENCY MEDICINE
Payer: MEDICAID

## 2017-05-31 VITALS
WEIGHT: 159.39 LBS | BODY MASS INDEX: 30.09 KG/M2 | HEART RATE: 86 BPM | SYSTOLIC BLOOD PRESSURE: 134 MMHG | RESPIRATION RATE: 18 BRPM | OXYGEN SATURATION: 92 % | HEIGHT: 61 IN | DIASTOLIC BLOOD PRESSURE: 70 MMHG | TEMPERATURE: 97.5 F

## 2017-05-31 DIAGNOSIS — J40 BRONCHITIS: ICD-10-CM

## 2017-05-31 DIAGNOSIS — J01.10 ACUTE NON-RECURRENT FRONTAL SINUSITIS: ICD-10-CM

## 2017-05-31 PROCEDURE — 99284 EMERGENCY DEPT VISIT MOD MDM: CPT

## 2017-05-31 PROCEDURE — 71010 DX-CHEST-PORTABLE (1 VIEW): CPT

## 2017-05-31 PROCEDURE — 700102 HCHG RX REV CODE 250 W/ 637 OVERRIDE(OP): Performed by: EMERGENCY MEDICINE

## 2017-05-31 PROCEDURE — A9270 NON-COVERED ITEM OR SERVICE: HCPCS | Performed by: EMERGENCY MEDICINE

## 2017-05-31 RX ORDER — IBUPROFEN 600 MG/1
600 TABLET ORAL ONCE
Status: COMPLETED | OUTPATIENT
Start: 2017-05-31 | End: 2017-05-31

## 2017-05-31 RX ORDER — ALOGLIPTIN AND METFORMIN HYDROCHLORIDE 12.5; 1 MG/1; MG/1
1 TABLET, FILM COATED ORAL EVERY MORNING
Status: SHIPPED | COMMUNITY
End: 2017-09-18

## 2017-05-31 RX ORDER — AMOXICILLIN 875 MG/1
875 TABLET, COATED ORAL 2 TIMES DAILY
Qty: 20 TAB | Refills: 0 | Status: SHIPPED | OUTPATIENT
Start: 2017-05-31 | End: 2017-08-17

## 2017-05-31 RX ORDER — AMOXICILLIN 250 MG/1
500 CAPSULE ORAL ONCE
Status: COMPLETED | OUTPATIENT
Start: 2017-05-31 | End: 2017-05-31

## 2017-05-31 RX ADMIN — IBUPROFEN 600 MG: 600 TABLET, FILM COATED ORAL at 07:57

## 2017-05-31 RX ADMIN — AMOXICILLIN 500 MG: 250 CAPSULE ORAL at 08:47

## 2017-05-31 ASSESSMENT — PAIN SCALES - GENERAL: PAINLEVEL_OUTOF10: 9

## 2017-05-31 NOTE — ED PROVIDER NOTES
ED Provider Note    CHIEF COMPLAINT  Chief Complaint   Patient presents with   • Head Ache   • Jaw Pain   • Cough       HPI  Shahiad Price is a 56 y.o. female who presents to emergency room today with complaints of cough productive at times ×1 week. Headache with facial pain over the frontal reading down towards the maxillary and jaw area on the right. This started just recently. States that the pain is worse when she coughs. She's had fever at home and congestion with bloody nose. No nausea vomiting no chest pain or shortness of breath this time. Symptoms occurred in the context of her normal activities. Had sore throat several days ago now gone.    REVIEW OF SYSTEMS  See HPI for further details. All other systems are negative.      PAST MEDICAL HISTORY  Past Medical History   Diagnosis Date   • Diabetes (CMS-HCC)    • Depression    • Gastritis    • Atopic dermatitis    • Hypertension    • Allergy    • Hyperlipidemia        FAMILY HISTORY  Family History   Problem Relation Age of Onset   • Cancer Mother      lung cancer, chronic smoker   • Lung Disease Mother    • Diabetes Father        SOCIAL HISTORY  Social History     Social History   • Marital Status: Legally      Spouse Name: N/A   • Number of Children: N/A   • Years of Education: N/A     Social History Main Topics   • Smoking status: Former Smoker -- 1.00 packs/day for 15 years     Quit date: 04/17/2002   • Smokeless tobacco: Never Used   • Alcohol Use: No      Comment: 1 beer / month   • Drug Use: No   • Sexual Activity: Not Asked      Comment: menopause 2015     Other Topics Concern   • None     Social History Narrative       SURGICAL HISTORY  Past Surgical History   Procedure Laterality Date   • Cholecystectomy  2007   • Other orthopedic surgery  2007     shoulder surgery       CURRENT MEDICATIONS  Home Medications     Reviewed by Stephen Quigley (Pharmacy Tech) on 05/31/17 at 0758  Med List Status: Complete    Medication Last Dose Status  "   albuterol 108 (90 BASE) MCG/ACT Aero Soln inhalation aerosol 5/30/2017 Active    Alogliptin-Metformin HCl (KAZANO) 12.5-1000 MG Tab 5/30/2017 Active    aspirin (ASA) 81 MG Chew Tab chewable tablet 5/30/2017 Active    atorvastatin (LIPITOR) 20 MG Tab 5/30/2017 Active    cetirizine (ZYRTEC) 10 MG Tab 5/30/2017 Active    Empagliflozin (JARDIANCE) 25 MG Tab 5/30/2017 Active    lisinopril (PRINIVIL) 10 MG Tab 5/30/2017 Active    metformin ER (GLUCOPHAGE XR) 500 MG TABLET SR 24 HR 5/30/2017 Active    metoprolol SR (TOPROL XL) 25 MG TABLET SR 24 HR 5/30/2017 Active    pantoprazole (PROTONIX) 40 MG Tablet Delayed Response 5/30/2017 Active                ALLERGIES  No Known Allergies    PHYSICAL EXAM  VITAL SIGNS: /75 mmHg  Pulse 58  Temp(Src) 36.4 °C (97.5 °F)  Ht 1.549 m (5' 0.98\")  Wt 72.3 kg (159 lb 6.3 oz)  BMI 30.13 kg/m2  SpO2 95%      Constitutional :  Well developed, Well nourished, No acute distress, Non-toxic appearance.   HENT: Tenderness to percussion over frontal/maxillary sinus on the right, pharynx is without injection  Eyes: Perrla  Neck: Normal range of motion, No tenderness, Supple, No stridor.   Lymphatic: Submandibular lymphadenopathy and right.   Cardiovascular: Normal heart rate, Normal rhythm, No murmurs, No rubs, No gallops.   Thorax & Lungs: Clear to auscultation all fields, no rales, rhonchi or wheezing noted.  Skin: Warm, Dry, No erythema, No rash.   Extremities: Intact distal pulses, No edema, No tenderness, No cyanosis, No clubbing.       RADIOLOGY/PROCEDURES  DX-CHEST-PORTABLE (1 VIEW)   Final Result      Negative single view of the chest.            COURSE & MEDICAL DECISION MAKING  Pertinent Labs & Imaging studies reviewed. (See chart for details)  Patient has tenderness over the sinus area placed on amoxicillin, hycet for cough. Follow up with primary care physician over the next 2440 hrs. return if persistent worsening symptoms. She verbalizes understanding instructions son " is at bedside to translate.    FINAL IMPRESSION  1. Acute sinusitis  2. Bronchitis  3.      Electronically signed by: Gerardo Veloz, 5/31/2017

## 2017-05-31 NOTE — ED AVS SNAPSHOT
Home Care Instructions                                                                                                                Shahida Price   MRN: [id number]    Department:  [facility], Emergency Dept   Date of Visit:  5/31/2017            [facility], Emergency Dept    [address]    [place] [address]    Phone:  [phone]      You were seen by     Gerardo Veloz D.O.      Your Diagnosis Was     Acute non-recurrent frontal sinusitis     J01.10       These are the medications you received during your hospitalization from 05/31/2017 0713 to 05/31/2017 0843     Date/Time Order Dose Route Action    05/31/2017 0757 ibuprofen (MOTRIN) tablet 600 mg 600 mg Oral Given      Follow-up Information     1. Schedule an appointment as soon as possible for a visit with Lola Gomez M.D..    Specialty:  Internal Medicine    Contact information    [address]  [facility] [address]  [place] [address] [phone]        Medication Information     Review all of your home medications and newly ordered medications with your primary doctor and/or pharmacist as soon as possible. Follow medication instructions as directed by your doctor and/or pharmacist.     Please keep your complete medication list with you and share with your physician. Update the information when medications are discontinued, doses are changed, or new medications (including over-the-counter products) are added; and carry medication information at all times in the event of emergency situations.               Medication List      START taking these medications        Instructions    Morning Afternoon Evening Bedtime    amoxicillin 875 MG tablet   Commonly known as:  AMOXIL        Take 1 Tab by mouth 2 times a day.   Dose:  875 mg                        hydrocodone-acetaminophen 2.5-108 mg/5mL 7.5-325 MG/15ML solution   Commonly known as:  HYCET        Take 10 mL by mouth 4 times a day as needed for Cough.   Dose:   10 mL                          ASK your doctor about these medications        Instructions    Morning Afternoon Evening Bedtime    albuterol 108 (90 BASE) MCG/ACT Aers inhalation aerosol        Inhale 2 Puffs by mouth every 6 hours as needed for Shortness of Breath.   Dose:  2 Puff                        aspirin 81 MG Chew chewable tablet   Commonly known as:  ASA        Take 1 Tab by mouth every day.   Dose:  81 mg                        atorvastatin 20 MG Tabs   Commonly known as:  LIPITOR        Take 1 Tab by mouth every day.   Dose:  20 mg                        cetirizine 10 MG Tabs   Commonly known as:  ZYRTEC        Take 10 mg by mouth every day.   Dose:  10 mg                        JARDIANCE 25 MG Tabs   Generic drug:  Empagliflozin        Take 25 mg by mouth every day.   Dose:  25 mg                        KAZANO 12.5-1000 MG Tabs   Generic drug:  Alogliptin-Metformin HCl        Take 1 Tab by mouth every morning.   Dose:  1 Tab                        lisinopril 10 MG Tabs   Commonly known as:  PRINIVIL        Take 10 mg by mouth every day.   Dose:  10 mg                        metformin  MG Tb24   Commonly known as:  GLUCOPHAGE XR        Take 2 Tabs by mouth every evening.   Dose:  1000 mg                        metoprolol SR 25 MG Tb24   Commonly known as:  TOPROL XL        Take 1 Tab by mouth every day.   Dose:  25 mg                        pantoprazole 40 MG Tbec   Commonly known as:  PROTONIX        Take 40 mg by mouth every day.   Dose:  40 mg                             Where to Get Your Medications      You can get these medications from any pharmacy     Bring a paper prescription for each of these medications    - amoxicillin 875 MG tablet  - hydrocodone-acetaminophen 2.5-108 mg/5mL 7.5-325 MG/15ML solution            Procedures and tests performed during your visit     DX-CHEST-PORTABLE (1 VIEW)        Discharge Instructions       Bronquitis  (Bronchitis)  La bronquitis es eleuterio enfermedad de  los conductos que transportan el aire a los pulmones. [place] dificulta la entrada y salida del aire. El estrechamiento de los conductos puede causar mucha tos. No recibir el tratamiento adecuado puede causar eleuterio bronquitis de larga duración (crónica).  CUIDADOS EN EL HOGAR  · [place] mucha agua para mantener la orina olivia o de color amarillo darnell.  · Use un humidificador de vapor frío.  · Si fuma, deje de fumar. Si sigue fumando, impedirá la curación de la bronquitis.  · [place] los medicamentos según las indicaciones de christie médico.  SOLICITE AYUDA DE INMEDIATO SI:  · La tos lo despierta por las noches.  · Respira con dificultad.  · Usted o christie tati se vuelve débil o más enfermo.  · Presenta dificultades para respirar, tiene sibilancias o le falta el aire.  · Tose y escupe robert.  · La tos dura más de 2 semanas.  · Tiene fiebre.  · Christie bebé tiene más de 3 meses y christie temperatura rectal es de 102° F (38.9° C) o más.  · Christie bebé tiene 3 meses o menos y christie temperatura rectal es de 100.4° F (38° C) o más.  ASEGURESE DE:  · Comprende estas instrucciones.  · Controlará christie enfermedad.  · Solicitará ayuda de inmediato si no mejora o empeora.  Document Released: 03/16/2010 Document Revised: 03/11/2013  ExitCare® Patient Information ©2014 [employer].    Sinusitis en adultos  (Sinusitis, Adult)  La sinusitis es el enrojecimiento, el dolor y la inflamación de los senos paranasales. Los senos paranasales son cavidades de aire que están dentro de los huesos de la mike. Se encuentran debajo de los ojos, en la mitad de la frente y encima de los ojos. En los senos paranasales sanos, el moco puede drenar y el aire circula a través de ellos en christie gutierrez hacia la nariz. Sin embargo, cuando se inflaman, el moco y el aire quedan atrapados. [place] hace que se desarrollen bacterias y otros gérmenes que causan infección.  La sinusitis puede desarrollarse rápidamente y durar solo un tiempo corto (aguda) o continuar por un período latrice (crónica). La  sinusitis que dura más de 12 semanas se considera crónica.  CAUSAS  Las causas de la sinusitis son:  · Alergias.  · Las anomalías estructurales, juan el desplazamiento del cartílago que separa las fosas nasales (desvío del tabique), que puede disminuir el flujo de aire por la nariz y los senos paranasales, y afectar christie drenaje.  · Las anomalías funcionales, juan cuando los pequeños pelos (cilias) que se encuentran en los senos paranasales y ayudan a eliminar el moco no funcionan correctamente o no están presentes.  SIGNOS Y SÍNTOMAS  Los síntomas de la sinusitis aguda y crónica son los mismos. Los síntomas principales son el dolor y la presión alrededor de los senos paranasales afectados. Otros síntomas son:  · Dolor en los dientes superiores.  · Dolor de oídos.  · Dolor de carl.  · Mal aliento.  · Disminución del sentido del olfato y del gusto.  · Tos, que empeora al acostarse.  · Fatiga.  · Fiebre.  · Drenaje de moco espeso por la nariz, que generalmente es de color nica y puede contener pus (purulento).  · Hinchazón y calor en los senos paranasales afectados.  DIAGNÓSTICO  Christie médico le realizará un examen físico. Aaron el examen, el médico puede hacer cualquiera de estas cosas para determinar si usted tiene sinusitis aguda o crónica:  · Le revisará la nariz para buscar signos de crecimientos anormales en las fosas nasales (pólipos nasales).  · [place] los senos paranasales afectados para buscar signos de infección.  · Observará la parte interna de los senos paranasales con un dispositivo que tiene eleuterio yoandy (endoscopio).  Si el médico sospecha que usted sufre sinusitis crónica, podrá indicar eleuterio o más de las siguientes pruebas:  · Pruebas de alergia.  · Cultivo de las secreciones nasales. Se extrae eleuterio muestra de moco de la nariz, que se envía al laboratorio para detectar bacterias.  · Citología nasal. Se extrae eleuterio muestra de moco de la nariz, que el médico examina para determinar si la sinusitis está  relacionada con eleuterio alergia.  TRATAMIENTO  La mayoría de los casos de sinusitis aguda se deben a eleuterio infección viral y se resuelven espontáneamente en un período de 10 días. A veces, se recetan medicamentos juan ayuda para aliviar los síntomas tanto de la sinusitis aguda juan de la crónica. Estos pueden incluir analgésicos, descongestivos, aerosoles nasales con corticoides o aerosoles de solución salina.  Sin embargo, para la sinusitis por infección bacteriana, el médico le recetará antibióticos. Los antibióticos son medicamentos que destruyen las bacterias que causan la infección.  Con poca frecuencia, la sinusitis tiene christie origen en eleuterio infección por hongos. En estos casos, el médico le recetará un medicamento antimicótico.  Para algunos casos de sinusitis crónica, es necesario someterse a eleuterio cirugía. Generalmente, se trata de casos en los que la sinusitis se repite más de 3 veces al año, a pesar de otros tratamientos.  INSTRUCCIONES PARA EL CUIDADO EN EL HOGAR  · [place] abundante agua. Los líquidos ayudan a disolver el moco, para que drene más fácilmente de los senos paranasales.  · Use un humidificador.  · Inhale vapor de 3 a 4 veces al día (por ejemplo, siéntese en el baño con la ducha abierta).  · Aplíquese un paño tibio y húmedo en la mike 3 o 4 veces al día o según las indicaciones del médico.  · Use un aerosol nasal salino para ayudar a humedecer y limpiar los senos nasales.  · [place] los medicamentos solamente juan se lo haya indicado el médico.  · Si le recetaron un antimicótico o un antibiótico, asegúrese de terminarlos, incluso si comienza a sentirse mejor.  SOLICITE ATENCIÓN MÉDICA DE INMEDIATO SI:  · Siente más dolor o sufre mich de carl intensos.  · Tiene náuseas, vómitos o somnolencia.  · Observa hinchazón alrededor del lane.  · Tiene problemas de visión.  · Presenta rigidez en el wicho.  · Tiene dificultad para respirar.     Esta información no tiene juan fin reemplazar el consejo del médico.  Asegúrese de hacerle al médico cualquier pregunta que tenga.     Document Released: 09/27/2006 Document Revised: 01/08/2016  Elsevier Interactive Patient Education ©2016 Elsevier Inc.            Patient Information     Patient Information    Following emergency treatment: all patient requiring follow-up care must return either to a private physician or a clinic if your condition worsens before you are able to obtain further medical attention, please return to the emergency room.     Billing Information    At [facility], we work to make the billing process streamlined for our patients.  Our Representatives are here to answer any questions you may have regarding your hospital bill.  If you have insurance coverage and have supplied your insurance information to us, we will submit a claim to your insurer on your behalf.  Should you have any questions regarding your bill, we can be reached online or by phone as follows:  Online: You are able pay your bills online or live chat with our representatives about any billing questions you may have. We are here to help Monday - Friday from 8:00am to 7:30pm and 9:00am - 12:00pm on Saturdays.  Please visit [email or web address]  for more information.   Phone:  [phone] or [phone]    Please note that your emergency physician, surgeon, pathologist, radiologist, anesthesiologist, and other specialists are not employed by [facility] and will therefore bill separately for their services.  Please contact them directly for any questions concerning their bills at the numbers below:     Emergency Physician Services:  [phone]  [facility]:  [phone]  [facility]:  [phone]  [facility]:  [phone]    1. Your final bill may vary from the amount quoted upon discharge if all procedures are not complete at that time, or if your doctor has additional procedures of which we are not aware.  You will receive an additional bill if you return to the Emergency Department at [facility] for suture removal regardless of the facility of which the sutures were placed.     2. Please arrange for settlement of this account at the emergency registration.    3. All self-pay accounts are due in full at the time of treatment.  If you are unable to meet this obligation then payment is expected within 4-5 days.     4. If you have had radiology studies (CT, X-ray, Ultrasound, MRI), you have received a preliminary result during your emergency department visit. Please contact the radiology department [phone] to receive a copy of your final result. Please discuss the Final result with your primary physician or with the follow up physician provided.     Crisis Hotline:  [place] Crisis Hotline:  7-703-INBBMFK or 1-966.387.3788  [place] Crisis Hotline:    1-705.402.6747 or 355-851-9471         ED Discharge Follow Up Questions    1. In order to provide you with very good care, we would like to follow up with a phone call in the next few days.  May we have your permission to contact you?     YES /  NO    2. What is the best phone number to call you? (       )_____-__________    3. What is the best time to call you?      Morning  /  Afternoon  /  Evening                   Patient Signature:  ____________________________________________________________    Date:  ____________________________________________________________      Your appointments     Jun 05, 2017  4:00 PM   Established Patient with Shila Godwin M.D.   [facility] / [facility] - Internal Medicine (--)    [address] [place] [id number] [phone]           You will be receiving a confirmation call a few days before your appointment from our automated call confirmation system.

## 2017-05-31 NOTE — ED NOTES
Pt c/o R sided HA and cough. Pt states last week she has had a cough and last night her head started hurting. States the HA is worse when she coughs. Denies fever, SOB or CP

## 2017-05-31 NOTE — ED AVS SNAPSHOT
Kaptur Access Code: [email or web address]  Expires: 6/30/2017  8:43 AM    Your email address is not on file at [facility].  Email Addresses are required for you to sign up for Kaptur, please contact [phone] to verify your personal information and to provide your email address prior to attempting to register for Kaptur.    Shahida Price  [address] [place] [address]    AppMak[employer]  A secure, online tool to manage your health information     [facility]’s Kaptur® is a secure, online tool that connects you to your personalized health information from the privacy of your home -- day or night - making it very easy for you to manage your healthcare. Once the activation process is completed, you can even access your medical information using the Kaptur satish, which is available for free in the Apple Satish store or Google Play store.     To learn more about Kaptur, visit [email or web address]    There are two levels of access available (as shown below):   My Chart Features  [facility] Primary Care Doctor [facility]  Specialists [facility]  Urgent  Care Non-[facility] Primary Care Doctor   Email your healthcare team securely and privately 24/7 X X X    Manage appointments: schedule your next appointment; view details of past/upcoming appointments X      Request prescription refills. X      View recent personal medical records, including lab and immunizations X X X X   View health record, including health history, allergies, medications X X X X   Read reports about your outpatient visits, procedures, consult and ER notes X X X X   See your discharge summary, which is a recap of your hospital and/or ER visit that includes your diagnosis, lab results, and care plan X X  X     How to register for AppMakrt:  Once your e-mail address has been verified, follow the following steps to sign up for AppMakrt.     1. Go to  [email or web address]  2. Click on the Sign Up Now box, which takes you to the New Member Sign Up  page. You will need to provide the following information:  a. Enter your [employer] Access Code exactly as it appears at the top of this page. (You will not need to use this code after you’ve completed the sign-up process. If you do not sign up before the expiration date, you must request a new code.)   b. Enter your date of birth.   c. Enter your home email address.   d. Click Submit, and follow the next screen’s instructions.  3. Create a [employer] ID. This will be your [employer] login ID and cannot be changed, so think of one that is secure and easy to remember.  4. Create a [employer] password. You can change your password at any time.  5. Enter your Password Reset Question and Answer. This can be used at a later time if you forget your password.   6. Enter your e-mail address. This allows you to receive e-mail notifications when new information is available in [employer].  7. Click Sign Up. You can now view your health information.    For assistance activating your [employer] account, call [phone]

## 2017-05-31 NOTE — DISCHARGE INSTRUCTIONS
Bronquitis  (Bronchitis)  La bronquitis es eleuterio enfermedad de los conductos que transportan el aire a los pulmones. [place] dificulta la entrada y salida del aire. El estrechamiento de los conductos puede causar mucha tos. No recibir el tratamiento adecuado puede causar eleuterio bronquitis de larga duración (crónica).  CUIDADOS EN EL HOGAR  · [place] mucha agua para mantener la orina olivia o de color amarillo darnell.  · Use un humidificador de vapor frío.  · Si fuma, deje de fumar. Si sigue fumando, impedirá la curación de la bronquitis.  · [place] los medicamentos según las indicaciones de christie médico.  SOLICITE AYUDA DE INMEDIATO SI:  · La tos lo despierta por las noches.  · Respira con dificultad.  · Usted o christie tati se vuelve débil o más enfermo.  · Presenta dificultades para respirar, tiene sibilancias o le falta el aire.  · Tose y escupe robert.  · La tos dura más de 2 semanas.  · Tiene fiebre.  · Christie bebé tiene más de 3 meses y christie temperatura rectal es de 102° F (38.9° C) o más.  · Christie bebé tiene 3 meses o menos y christie temperatura rectal es de 100.4° F (38° C) o más.  ASEGURESE DE:  · Comprende estas instrucciones.  · Controlará christie enfermedad.  · Solicitará ayuda de inmediato si no mejora o empeora.  Document Released: 03/16/2010 Document Revised: 03/11/2013  ExitCare® Patient Information ©2014 [employer].    Sinusitis en adultos  (Sinusitis, Adult)  La sinusitis es el enrojecimiento, el dolor y la inflamación de los senos paranasales. Los senos paranasales son cavidades de aire que están dentro de los huesos de la mike. Se encuentran debajo de los ojos, en la mitad de la frente y encima de los ojos. En los senos paranasales sanos, el moco puede drenar y el aire circula a través de ellos en christie gutierrez hacia la nariz. Sin embargo, cuando se inflaman, el moco y el aire quedan atrapados. [place] hace que se desarrollen bacterias y otros gérmenes que causan infección.  La sinusitis puede desarrollarse rápidamente y durar solo un tiempo  corto (aguda) o continuar por un período latrice (crónica). La sinusitis que dura más de 12 semanas se considera crónica.  CAUSAS  Las causas de la sinusitis son:  · Alergias.  · Las anomalías estructurales, juan el desplazamiento del cartílago que separa las fosas nasales (desvío del tabique), que puede disminuir el flujo de aire por la nariz y los senos paranasales, y afectar christie drenaje.  · Las anomalías funcionales, juan cuando los pequeños pelos (cilias) que se encuentran en los senos paranasales y ayudan a eliminar el moco no funcionan correctamente o no están presentes.  SIGNOS Y SÍNTOMAS  Los síntomas de la sinusitis aguda y crónica son los mismos. Los síntomas principales son el dolor y la presión alrededor de los senos paranasales afectados. Otros síntomas son:  · Dolor en los dientes superiores.  · Dolor de oídos.  · Dolor de carl.  · Mal aliento.  · Disminución del sentido del olfato y del gusto.  · Tos, que empeora al acostarse.  · Fatiga.  · Fiebre.  · Drenaje de moco espeso por la nariz, que generalmente es de color nica y puede contener pus (purulento).  · Hinchazón y calor en los senos paranasales afectados.  DIAGNÓSTICO  Christie médico le realizará un examen físico. Aaron el examen, el médico puede hacer cualquiera de estas cosas para determinar si usted tiene sinusitis aguda o crónica:  · Le revisará la nariz para buscar signos de crecimientos anormales en las fosas nasales (pólipos nasales).  · [place] los senos paranasales afectados para buscar signos de infección.  · Observará la parte interna de los senos paranasales con un dispositivo que tiene eleuterio yoandy (endoscopio).  Si el médico sospecha que usted sufre sinusitis crónica, podrá indicar eleuterio o más de las siguientes pruebas:  · Pruebas de alergia.  · Cultivo de las secreciones nasales. Se extrae eleuterio muestra de moco de la nariz, que se envía al laboratorio para detectar bacterias.  · Citología nasal. Se extrae eleuterio muestra de moco de la nariz, que  el médico examina para determinar si la sinusitis está relacionada con eleuterio alergia.  TRATAMIENTO  La mayoría de los casos de sinusitis aguda se deben a eleuterio infección viral y se resuelven espontáneamente en un período de 10 días. A veces, se recetan medicamentos jaun ayuda para aliviar los síntomas tanto de la sinusitis aguda juan de la crónica. Estos pueden incluir analgésicos, descongestivos, aerosoles nasales con corticoides o aerosoles de solución salina.  Sin embargo, para la sinusitis por infección bacteriana, el médico le recetará antibióticos. Los antibióticos son medicamentos que destruyen las bacterias que causan la infección.  Con poca frecuencia, la sinusitis tiene christie origen en eleuterio infección por hongos. En estos casos, el médico le recetará un medicamento antimicótico.  Para algunos casos de sinusitis crónica, es necesario someterse a eleuterio cirugía. Generalmente, se trata de casos en los que la sinusitis se repite más de 3 veces al año, a pesar de otros tratamientos.  INSTRUCCIONES PARA EL CUIDADO EN EL HOGAR  · [place] abundante agua. Los líquidos ayudan a disolver el moco, para que drene más fácilmente de los senos paranasales.  · Use un humidificador.  · Inhale vapor de 3 a 4 veces al día (por ejemplo, siéntese en el baño con la ducha abierta).  · Aplíquese un paño tibio y húmedo en la mike 3 o 4 veces al día o según las indicaciones del médico.  · Use un aerosol nasal salino para ayudar a humedecer y limpiar los senos nasales.  · [place] los medicamentos solamente juan se lo haya indicado el médico.  · Si le recetaron un antimicótico o un antibiótico, asegúrese de terminarlos, incluso si comienza a sentirse mejor.  SOLICITE ATENCIÓN MÉDICA DE INMEDIATO SI:  · Siente más dolor o sufre mich de carl intensos.  · Tiene náuseas, vómitos o somnolencia.  · Observa hinchazón alrededor del lane.  · Tiene problemas de visión.  · Presenta rigidez en el wicho.  · Tiene dificultad para respirar.     Esta  información no tiene juan fin reemplazar el consejo del médico. Asegúrese de hacerle al médico cualquier pregunta que tenga.     Document Released: 09/27/2006 Document Revised: 01/08/2016  Elsevier Interactive Patient Education ©2016 Elsevier Inc.

## 2017-08-17 ENCOUNTER — APPOINTMENT (OUTPATIENT)
Dept: RADIOLOGY | Facility: MEDICAL CENTER | Age: 57
End: 2017-08-17
Attending: EMERGENCY MEDICINE
Payer: MEDICAID

## 2017-08-17 ENCOUNTER — RESOLUTE PROFESSIONAL BILLING HOSPITAL PROF FEE (OUTPATIENT)
Dept: HOSPITALIST | Facility: MEDICAL CENTER | Age: 57
End: 2017-08-17
Payer: MEDICAID

## 2017-08-17 ENCOUNTER — HOSPITAL ENCOUNTER (OUTPATIENT)
Facility: MEDICAL CENTER | Age: 57
End: 2017-08-18
Attending: EMERGENCY MEDICINE | Admitting: HOSPITALIST
Payer: MEDICAID

## 2017-08-17 DIAGNOSIS — R07.9 CHEST PAIN, UNSPECIFIED TYPE: ICD-10-CM

## 2017-08-17 PROBLEM — I20.9 ANGINA PECTORIS (HCC): Status: ACTIVE | Noted: 2017-08-17

## 2017-08-17 LAB
ALBUMIN SERPL BCP-MCNC: 4.3 G/DL (ref 3.2–4.9)
ALBUMIN/GLOB SERPL: 1.3 G/DL
ALP SERPL-CCNC: 119 U/L (ref 30–99)
ALT SERPL-CCNC: 42 U/L (ref 2–50)
ANION GAP SERPL CALC-SCNC: 9 MMOL/L (ref 0–11.9)
APTT PPP: 29.8 SEC (ref 24.7–36)
AST SERPL-CCNC: 29 U/L (ref 12–45)
BASOPHILS # BLD AUTO: 0.2 % (ref 0–1.8)
BASOPHILS # BLD: 0.02 K/UL (ref 0–0.12)
BILIRUB SERPL-MCNC: 0.6 MG/DL (ref 0.1–1.5)
BNP SERPL-MCNC: 7 PG/ML (ref 0–100)
BUN SERPL-MCNC: 10 MG/DL (ref 8–22)
CALCIUM SERPL-MCNC: 9.4 MG/DL (ref 8.4–10.2)
CHLORIDE SERPL-SCNC: 105 MMOL/L (ref 96–112)
CO2 SERPL-SCNC: 24 MMOL/L (ref 20–33)
CREAT SERPL-MCNC: 0.53 MG/DL (ref 0.5–1.4)
EKG IMPRESSION: NORMAL
EOSINOPHIL # BLD AUTO: 0.16 K/UL (ref 0–0.51)
EOSINOPHIL NFR BLD: 2 % (ref 0–6.9)
ERYTHROCYTE [DISTWIDTH] IN BLOOD BY AUTOMATED COUNT: 42 FL (ref 35.9–50)
EST. AVERAGE GLUCOSE BLD GHB EST-MCNC: 166 MG/DL
GFR SERPL CREATININE-BSD FRML MDRD: >60 ML/MIN/1.73 M 2
GLOBULIN SER CALC-MCNC: 3.4 G/DL (ref 1.9–3.5)
GLUCOSE BLD-MCNC: 100 MG/DL (ref 65–99)
GLUCOSE BLD-MCNC: 156 MG/DL (ref 65–99)
GLUCOSE SERPL-MCNC: 159 MG/DL (ref 65–99)
HBA1C MFR BLD: 7.4 % (ref 0–5.6)
HCT VFR BLD AUTO: 40.4 % (ref 37–47)
HGB BLD-MCNC: 13.7 G/DL (ref 12–16)
IMM GRANULOCYTES # BLD AUTO: 0.02 K/UL (ref 0–0.11)
IMM GRANULOCYTES NFR BLD AUTO: 0.2 % (ref 0–0.9)
INR PPP: 0.91 (ref 0.87–1.13)
LIPASE SERPL-CCNC: 36 U/L (ref 7–58)
LYMPHOCYTES # BLD AUTO: 3.18 K/UL (ref 1–4.8)
LYMPHOCYTES NFR BLD: 39.7 % (ref 22–41)
MAGNESIUM SERPL-MCNC: 1.7 MG/DL (ref 1.5–2.5)
MCH RBC QN AUTO: 28.1 PG (ref 27–33)
MCHC RBC AUTO-ENTMCNC: 33.9 G/DL (ref 33.6–35)
MCV RBC AUTO: 83 FL (ref 81.4–97.8)
MONOCYTES # BLD AUTO: 0.52 K/UL (ref 0–0.85)
MONOCYTES NFR BLD AUTO: 6.5 % (ref 0–13.4)
NEUTROPHILS # BLD AUTO: 4.12 K/UL (ref 2–7.15)
NEUTROPHILS NFR BLD: 51.4 % (ref 44–72)
NRBC # BLD AUTO: 0 K/UL
NRBC BLD AUTO-RTO: 0 /100 WBC
PLATELET # BLD AUTO: 243 K/UL (ref 164–446)
PMV BLD AUTO: 10.3 FL (ref 9–12.9)
POTASSIUM SERPL-SCNC: 3.5 MMOL/L (ref 3.6–5.5)
PROT SERPL-MCNC: 7.7 G/DL (ref 6–8.2)
PROTHROMBIN TIME: 12.1 SEC (ref 12–14.6)
RBC # BLD AUTO: 4.87 M/UL (ref 4.2–5.4)
SODIUM SERPL-SCNC: 138 MMOL/L (ref 135–145)
TROPONIN I SERPL-MCNC: <0.02 NG/ML (ref 0–0.04)
TROPONIN I SERPL-MCNC: <0.02 NG/ML (ref 0–0.04)
TSH SERPL DL<=0.005 MIU/L-ACNC: 2.95 UIU/ML (ref 0.35–5.5)
WBC # BLD AUTO: 8 K/UL (ref 4.8–10.8)

## 2017-08-17 PROCEDURE — 93005 ELECTROCARDIOGRAM TRACING: CPT

## 2017-08-17 PROCEDURE — 83690 ASSAY OF LIPASE: CPT

## 2017-08-17 PROCEDURE — 85025 COMPLETE CBC W/AUTO DIFF WBC: CPT

## 2017-08-17 PROCEDURE — 700102 HCHG RX REV CODE 250 W/ 637 OVERRIDE(OP): Performed by: HOSPITALIST

## 2017-08-17 PROCEDURE — G0378 HOSPITAL OBSERVATION PER HR: HCPCS

## 2017-08-17 PROCEDURE — 94760 N-INVAS EAR/PLS OXIMETRY 1: CPT

## 2017-08-17 PROCEDURE — 83036 HEMOGLOBIN GLYCOSYLATED A1C: CPT

## 2017-08-17 PROCEDURE — 84484 ASSAY OF TROPONIN QUANT: CPT | Mod: 91

## 2017-08-17 PROCEDURE — 99220 PR INITIAL OBSERVATION CARE,LEVL III: CPT | Performed by: HOSPITALIST

## 2017-08-17 PROCEDURE — 85730 THROMBOPLASTIN TIME PARTIAL: CPT

## 2017-08-17 PROCEDURE — A9270 NON-COVERED ITEM OR SERVICE: HCPCS | Performed by: EMERGENCY MEDICINE

## 2017-08-17 PROCEDURE — 80053 COMPREHEN METABOLIC PANEL: CPT

## 2017-08-17 PROCEDURE — 99285 EMERGENCY DEPT VISIT HI MDM: CPT

## 2017-08-17 PROCEDURE — 36415 COLL VENOUS BLD VENIPUNCTURE: CPT

## 2017-08-17 PROCEDURE — 84443 ASSAY THYROID STIM HORMONE: CPT

## 2017-08-17 PROCEDURE — 85610 PROTHROMBIN TIME: CPT

## 2017-08-17 PROCEDURE — 700111 HCHG RX REV CODE 636 W/ 250 OVERRIDE (IP): Performed by: HOSPITALIST

## 2017-08-17 PROCEDURE — 82962 GLUCOSE BLOOD TEST: CPT | Mod: 91

## 2017-08-17 PROCEDURE — 83880 ASSAY OF NATRIURETIC PEPTIDE: CPT

## 2017-08-17 PROCEDURE — 700102 HCHG RX REV CODE 250 W/ 637 OVERRIDE(OP): Performed by: EMERGENCY MEDICINE

## 2017-08-17 PROCEDURE — A9270 NON-COVERED ITEM OR SERVICE: HCPCS | Performed by: HOSPITALIST

## 2017-08-17 PROCEDURE — 71010 DX-CHEST-PORTABLE (1 VIEW): CPT

## 2017-08-17 PROCEDURE — 83735 ASSAY OF MAGNESIUM: CPT

## 2017-08-17 RX ORDER — POTASSIUM CHLORIDE 20 MEQ/1
20 TABLET, EXTENDED RELEASE ORAL 2 TIMES DAILY
Status: DISCONTINUED | OUTPATIENT
Start: 2017-08-17 | End: 2017-08-18 | Stop reason: HOSPADM

## 2017-08-17 RX ORDER — ASPIRIN 81 MG/1
324 TABLET, CHEWABLE ORAL DAILY
Status: DISCONTINUED | OUTPATIENT
Start: 2017-08-17 | End: 2017-08-18 | Stop reason: HOSPADM

## 2017-08-17 RX ORDER — PROMETHAZINE HYDROCHLORIDE 25 MG/1
12.5-25 SUPPOSITORY RECTAL EVERY 4 HOURS PRN
Status: DISCONTINUED | OUTPATIENT
Start: 2017-08-17 | End: 2017-08-18 | Stop reason: HOSPADM

## 2017-08-17 RX ORDER — LABETALOL HYDROCHLORIDE 5 MG/ML
10 INJECTION, SOLUTION INTRAVENOUS EVERY 4 HOURS PRN
Status: DISCONTINUED | OUTPATIENT
Start: 2017-08-17 | End: 2017-08-18 | Stop reason: HOSPADM

## 2017-08-17 RX ORDER — METFORMIN HYDROCHLORIDE 500 MG/1
1000 TABLET, EXTENDED RELEASE ORAL EVERY EVENING
Status: DISCONTINUED | OUTPATIENT
Start: 2017-08-17 | End: 2017-08-18 | Stop reason: HOSPADM

## 2017-08-17 RX ORDER — ONDANSETRON 4 MG/1
4 TABLET, ORALLY DISINTEGRATING ORAL EVERY 4 HOURS PRN
Status: DISCONTINUED | OUTPATIENT
Start: 2017-08-17 | End: 2017-08-18 | Stop reason: HOSPADM

## 2017-08-17 RX ORDER — PROMETHAZINE HYDROCHLORIDE 25 MG/1
12.5-25 TABLET ORAL EVERY 4 HOURS PRN
Status: DISCONTINUED | OUTPATIENT
Start: 2017-08-17 | End: 2017-08-18 | Stop reason: HOSPADM

## 2017-08-17 RX ORDER — DEXTROSE MONOHYDRATE 25 G/50ML
25 INJECTION, SOLUTION INTRAVENOUS
Status: DISCONTINUED | OUTPATIENT
Start: 2017-08-17 | End: 2017-08-18 | Stop reason: HOSPADM

## 2017-08-17 RX ORDER — OMEPRAZOLE 20 MG/1
20 CAPSULE, DELAYED RELEASE ORAL DAILY
Status: DISCONTINUED | OUTPATIENT
Start: 2017-08-18 | End: 2017-08-18 | Stop reason: HOSPADM

## 2017-08-17 RX ORDER — ZOLPIDEM TARTRATE 5 MG/1
5 TABLET ORAL
Status: DISCONTINUED | OUTPATIENT
Start: 2017-08-17 | End: 2017-08-18 | Stop reason: HOSPADM

## 2017-08-17 RX ORDER — BISACODYL 10 MG
10 SUPPOSITORY, RECTAL RECTAL
Status: DISCONTINUED | OUTPATIENT
Start: 2017-08-17 | End: 2017-08-18 | Stop reason: HOSPADM

## 2017-08-17 RX ORDER — PANTOPRAZOLE SODIUM 40 MG/1
40 TABLET, DELAYED RELEASE ORAL DAILY
Status: DISCONTINUED | OUTPATIENT
Start: 2017-08-17 | End: 2017-08-17

## 2017-08-17 RX ORDER — POLYETHYLENE GLYCOL 3350 17 G/17G
1 POWDER, FOR SOLUTION ORAL
Status: DISCONTINUED | OUTPATIENT
Start: 2017-08-17 | End: 2017-08-18 | Stop reason: HOSPADM

## 2017-08-17 RX ORDER — AMOXICILLIN 250 MG
2 CAPSULE ORAL 2 TIMES DAILY
Status: DISCONTINUED | OUTPATIENT
Start: 2017-08-17 | End: 2017-08-18 | Stop reason: HOSPADM

## 2017-08-17 RX ORDER — ASPIRIN 325 MG
325 TABLET ORAL DAILY
Status: DISCONTINUED | OUTPATIENT
Start: 2017-08-17 | End: 2017-08-18 | Stop reason: HOSPADM

## 2017-08-17 RX ORDER — ATORVASTATIN CALCIUM 40 MG/1
20 TABLET, FILM COATED ORAL
Status: DISCONTINUED | OUTPATIENT
Start: 2017-08-17 | End: 2017-08-18 | Stop reason: HOSPADM

## 2017-08-17 RX ORDER — ONDANSETRON 2 MG/ML
4 INJECTION INTRAMUSCULAR; INTRAVENOUS EVERY 4 HOURS PRN
Status: DISCONTINUED | OUTPATIENT
Start: 2017-08-17 | End: 2017-08-18 | Stop reason: HOSPADM

## 2017-08-17 RX ORDER — ACETAMINOPHEN 325 MG/1
650 TABLET ORAL EVERY 6 HOURS PRN
Status: DISCONTINUED | OUTPATIENT
Start: 2017-08-17 | End: 2017-08-18 | Stop reason: HOSPADM

## 2017-08-17 RX ORDER — METOPROLOL SUCCINATE 25 MG/1
25 TABLET, EXTENDED RELEASE ORAL DAILY
Status: DISCONTINUED | OUTPATIENT
Start: 2017-08-18 | End: 2017-08-18 | Stop reason: HOSPADM

## 2017-08-17 RX ORDER — ASPIRIN 81 MG/1
162 TABLET, CHEWABLE ORAL ONCE
Status: COMPLETED | OUTPATIENT
Start: 2017-08-17 | End: 2017-08-17

## 2017-08-17 RX ORDER — ASPIRIN 600 MG/1
300 SUPPOSITORY RECTAL DAILY
Status: DISCONTINUED | OUTPATIENT
Start: 2017-08-17 | End: 2017-08-18 | Stop reason: HOSPADM

## 2017-08-17 RX ORDER — ASPIRIN 81 MG/1
81 TABLET, CHEWABLE ORAL DAILY
Status: DISCONTINUED | OUTPATIENT
Start: 2017-08-17 | End: 2017-08-17

## 2017-08-17 RX ORDER — ALOGLIPTIN AND METFORMIN HYDROCHLORIDE 12.5; 1 MG/1; MG/1
1 TABLET, FILM COATED ORAL EVERY MORNING
Status: DISCONTINUED | OUTPATIENT
Start: 2017-08-18 | End: 2017-08-18 | Stop reason: HOSPADM

## 2017-08-17 RX ORDER — ALBUTEROL SULFATE 90 UG/1
2 AEROSOL, METERED RESPIRATORY (INHALATION) EVERY 6 HOURS PRN
Status: DISCONTINUED | OUTPATIENT
Start: 2017-08-17 | End: 2017-08-18 | Stop reason: HOSPADM

## 2017-08-17 RX ADMIN — INSULIN LISPRO 3 UNITS: 100 INJECTION, SOLUTION INTRAVENOUS; SUBCUTANEOUS at 21:07

## 2017-08-17 RX ADMIN — POTASSIUM CHLORIDE 20 MEQ: 1500 TABLET, EXTENDED RELEASE ORAL at 21:04

## 2017-08-17 RX ADMIN — ASPIRIN 81 MG 162 MG: 81 TABLET ORAL at 15:25

## 2017-08-17 RX ADMIN — ATORVASTATIN CALCIUM 20 MG: 40 TABLET, FILM COATED ORAL at 21:03

## 2017-08-17 RX ADMIN — ENOXAPARIN SODIUM 40 MG: 100 INJECTION SUBCUTANEOUS at 17:13

## 2017-08-17 RX ADMIN — METFORMIN HYDROCHLORIDE 1000 MG: 500 TABLET, EXTENDED RELEASE ORAL at 21:07

## 2017-08-17 ASSESSMENT — PAIN SCALES - GENERAL
PAINLEVEL_OUTOF10: 0
PAINLEVEL_OUTOF10: 7
PAINLEVEL_OUTOF10: 4

## 2017-08-17 ASSESSMENT — PATIENT HEALTH QUESTIONNAIRE - PHQ9
4. FEELING TIRED OR HAVING LITTLE ENERGY: SEVERAL DAYS
SUM OF ALL RESPONSES TO PHQ9 QUESTIONS 1 AND 2: 2
9. THOUGHTS THAT YOU WOULD BE BETTER OFF DEAD, OR OF HURTING YOURSELF: NOT AT ALL
5. POOR APPETITE OR OVEREATING: NOT AT ALL
1. LITTLE INTEREST OR PLEASURE IN DOING THINGS: SEVERAL DAYS
7. TROUBLE CONCENTRATING ON THINGS, SUCH AS READING THE NEWSPAPER OR WATCHING TELEVISION: NOT AT ALL
3. TROUBLE FALLING OR STAYING ASLEEP OR SLEEPING TOO MUCH: NOT AT ALL
SUM OF ALL RESPONSES TO PHQ QUESTIONS 1-9: 3
6. FEELING BAD ABOUT YOURSELF - OR THAT YOU ARE A FAILURE OR HAVE LET YOURSELF OR YOUR FAMILY DOWN: NOT AL ALL
8. MOVING OR SPEAKING SO SLOWLY THAT OTHER PEOPLE COULD HAVE NOTICED. OR THE OPPOSITE, BEING SO FIGETY OR RESTLESS THAT YOU HAVE BEEN MOVING AROUND A LOT MORE THAN USUAL: NOT AT ALL
2. FEELING DOWN, DEPRESSED, IRRITABLE, OR HOPELESS: SEVERAL DAYS

## 2017-08-17 ASSESSMENT — LIFESTYLE VARIABLES
ALCOHOL_USE: NO
EVER_SMOKED: YES
EVER_SMOKED: YES

## 2017-08-17 NOTE — ASSESSMENT & PLAN NOTE
Patient will have a cardiac workup. I will get an echocardiogram as she is having some fluid retention and some shortness of breath with dyspnea. We'll check 3 sets of cardiac markers followed by a stress test in the morning. We will optimize her with aspirin as well as beta blocker. Further decision making will be after her stress test is complete.

## 2017-08-17 NOTE — ASSESSMENT & PLAN NOTE
Continue with blood pressure managing unit using metoprolol XL 25 mg daily with holding parameters.  Patient will be managed PRN with labetalol 10 mg every 4 hours if her systolic blood pressures above 150 diastolic above 100.

## 2017-08-17 NOTE — IP AVS SNAPSHOT
8/18/2017    Shahida Price  [address]   [address]   [place] [address]    Dear Shahida:    [facility] wants to ensure your discharge home is safe and you or your loved ones have had all of your questions answered regarding your care after you leave the hospital.    Below is a list of resources and contact information should you have any questions regarding your hospital stay, follow-up instructions, or active medical symptoms.    Questions or Concerns Regarding… Contact   Medical Questions Related to Your Discharge  (7 days a week, 8am-5pm) Contact a Nurse Care Coordinator   [phone]   Medical Questions Not Related to Your Discharge  (24 hours a day / 7 days a week)  Contact the Nurse Health Line   [phone]    Medications or Discharge Instructions Refer to your discharge packet   or contact your [facility] Primary Care Provider   [phone]   Follow-up Appointment(s) Schedule your appointment via Pathful   or contact Scheduling [phone]   Billing Review your statement via Pathful  or contact Billing [phone]   Medical Records Review your records via Pathful   or contact Medical Records [phone]     You may receive a telephone call within two days of discharge. This call is to make certain you understand your discharge instructions and have the opportunity to have any questions answered. You can also easily access your medical information, test results and upcoming appointments via the Pathful free online health management tool. You can learn more and sign up at [employer]/Pathful. For assistance setting up your Pathful account, please call [phone].    Once again, we want to ensure your discharge home is safe and that you have a clear understanding of any next steps in your care. If you have any questions or concerns, please do not hesitate to contact us, we are here for you. Thank you for choosing [facility] for your healthcare needs.    Sincerely,    Your [facility] Healthcare Team

## 2017-08-17 NOTE — ASSESSMENT & PLAN NOTE
-accus with sliding scale coverage  -diabetic diet  -diabetic education  -follow glycohemoglobin levels long term most recent hemoglobin A1c is not available checked.  -continue with oral antihyperglycemics she uses metformin as an outpatient. Continue in-house.  -monitor for hypoglycemic episodes and adjust control if he should get low

## 2017-08-17 NOTE — ASSESSMENT & PLAN NOTE
Low-fat low-cholesterol diet, check a fasting lipid panel, continue with Lipitor 20 mg by mouth daily at bedtime.

## 2017-08-17 NOTE — IP AVS SNAPSHOT
" <p align=\"LEFT\"><IMG SRC=\"//[email or web address]\" alt=\"Image\" WIDTH=\"50%\" HEIGHT=\"200\" BORDER=\"\"></p>                   Name:Shahida Price  Medical Record Number:[id number]  CSN: [id number]    YOB: 1960   Age: 57 y.o.  Sex: female  HT:1.549 m (5' 1\") WT: 74 kg (163 lb 2.3 oz)          Admit Date: 8/17/2017     Discharge Date:   Today's Date: 8/18/2017  Attending Doctor:  Rossy Pacheco M.D.                  Allergies:  Shellfish allergy          Follow-up Information     1. Follow up with Suzanna Gilbert PA-C. Go on 8/22/2017.    Why:  PLEASE ARRIVE AT 10:40AM FOR YOUR APPOINTMENT. YOUR PROVIDER WAS UNAVAILABLE SO THIS APPOITNMENT WILL BE WITH SUZANNA GILBERT AT THE [facility] LOCATION.     Contact information    [address] [place] [address]  [phone]         Medication List      Take these Medications        Instructions    albuterol 108 (90 Base) MCG/ACT Aers inhalation aerosol    Inhale 2 Puffs by mouth every 6 hours as needed for Shortness of Breath.   Dose:  2 Puff       aspirin 81 MG Chew chewable tablet   Commonly known as:  ASA    Take 1 Tab by mouth every day.   Dose:  81 mg       atorvastatin 20 MG Tabs   Commonly known as:  LIPITOR    Take 1 Tab by mouth every day.   Dose:  20 mg       fish oil 1000 MG Caps capsule    Take 1 Cap by mouth 3 times a day, with meals.   Dose:  1000 mg       KAZANO 12.5-1000 MG Tabs   Generic drug:  Alogliptin-Metformin HCl    Take 1 Tab by mouth every morning.   Dose:  1 Tab       metformin  MG Tb24   Commonly known as:  GLUCOPHAGE XR    Take 2 Tabs by mouth every evening.   Dose:  1000 mg       metoprolol SR 25 MG Tb24   Commonly known as:  TOPROL XL    Take 1 Tab by mouth every day.   Dose:  25 mg       pantoprazole 40 MG Tbec   Commonly known as:  PROTONIX    Take 40 mg by mouth every day.   Dose:  40 mg         "

## 2017-08-17 NOTE — IP AVS SNAPSHOT
" Home Care Instructions                                                                                                                  Name:Shahida Price  Medical Record Number:[id number]  CSN: [id number]    YOB: 1960   Age: 57 y.o.  Sex: female  HT:1.549 m (5' 1\") WT: 74 kg (163 lb 2.3 oz)          Admit Date: 8/17/2017     Discharge Date:   Today's Date: 8/18/2017  Attending Doctor:  Rossy Pacheco M.D.                  Allergies:  Shellfish allergy            Discharge Instructions       Discharge Instructions    Discharged to home by car with relative. Discharged via wheelchair, hospital escort: Yes.  Special equipment needed: Not Applicable    Be sure to schedule a follow-up appointment with your primary care doctor or any specialists as instructed.     Discharge Plan:   Influenza Vaccine Indication: Patient Refuses    I understand that a diet low in cholesterol, fat, and sodium is recommended for good health. Unless I have been given specific instructions below for another diet, I accept this instruction as my diet prescription.   Other diet: Low Fat, Low Cholesterol    Special Instructions: None    · Is patient discharged on Warfarin / Coumadin?   No     · Is patient Post Blood Transfusion?  No    Angina de pecho  (Angina Pectoris)  La angina de pecho, a menudo llamada simplemente angina, es eleuterio sensación de molestia extrema en el pecho, el wicho o el brazo, causada por la falta de robert en la capa media y más gruesa de la pared del corazón (miocardio). Hay cuatro tipos de angina de pecho.  · Angina estable. La frecuencia y la duración de los episodios de angina estable son predecibles. Por lo general, la causa es la actividad física, el estrés emocional o la excitación. La angina estable suele durar unos pocos minutos y a menudo se maribel tomando un medicamento que se coloca debajo de la lengua, denominado nitroglicerina sublingual.  · Angina inestable. La angina inestable puede ocurrir aun " cuando el cuerpo realiza poco o ningún esfuerzo físico, e incluso mientras duerme o descansa. Puede aumentar de repente en gravedad o frecuencia. No se puede aliviar con nitroglicerina sublingual y puede durar hasta 30 minutos.  · Angina microvascular. Paola tipo de angina se produce por un trastorno de los vasos sanguíneos diminutos llamados arteriolas. Es más común en las mujeres. El dolor puede ser más intenso y durar más que otros tipos de angina de pecho.  · Angina de Prinzmetal o angina variante. Paola tipo de angina de pecho es poco frecuente y por lo general ocurre cuando realiza poco o ningún esfuerzo físico. Ocurre sobre todo en las primeras horas de la mañana.  CAUSAS  La ateroesclerosis causa la angina. Es la acumulación de grasa y colesterol (placa) en el interior de las arterias. Con el tiempo, la placa puede estrechar u obstruir la arteria y esto reducirá el flujo sanguíneo al corazón. La placa también puede debilitarse y desprenderse en kaur arteria coronaria para formar un coágulo y causar kaur obstrucción repentina.  FACTORES DE RIESGO  Los factores de riesgo comunes a hombres y mujeres incluyen lo siguiente:  · Niveles elevados de colesterol.  · Presión arterial elevada (hipertensión).  · Consumo de tabaco.  · Diabetes.  · Antecedentes familiares de angina.  · Obesidad.  · La falta de actividad física.  · Kaur dieta con alto contenido de grasas saturadas.  Las mujeres presentan un riesgo más alto de tener angina si:  · Tienen de 55 años.  · Están en la posmenopausia.  SÍNTOMAS  Muchas personas no presentan síntomas servando las primeras [place]apas de la angina. A medida que la afección progresa, los síntomas comunes a hombres y mujeres pueden incluir lo siguiente:  · Dolor en el pecho.  ¨ Se describe juan un dolor que aplasta o retuerce el pecho, o kaur opresión, presión, distensión o pesadez en el pecho.  ¨ El dolor puede durar más de unos minutos o puede detenerse y volver a presentarse.  · Dolor en los  brazos, el wicho, la mandíbula o la espalda.  · Acidez estomacal o empacho sin causa aparente.  · Falta de aire.  · Náuseas.  · Sudor frío repentino.  · Sensación repentina de desvanecimiento.  Muchas mujeres tienen molestias en el pecho y algunos de los otros síntomas. Sin embargo, a menudo presentan síntomas diferentes (atípicos), juan:   · Fatiga.  · Sensación inexplicable de nerviosismo o ansiedad.  · Debilidad sin causa aparente.  · Mareos o desmayos.  En ocasiones, las mujeres pueden tener angina sin presentar síntomas.  DIAGNÓSTICO   Entre los estudios para diagnosticar la angina se incluyen los siguientes:  · ECG (electrocardiograma).  · Prueba de esfuerzo. Se utiliza para detectar signos de obstrucción cuando el corazón se somete a ejercicio.  · Prueba de esfuerzo farmacológica. Se utiliza para detectar signos de obstrucción cuando el corazón se somete a esfuerzo mediante el uso de un medicamento.  · Análisis de robert.  · Angiografía coronaria. En dulce procedimiento se detecta si hay obstrucción en las arterias coronarias.  TRATAMIENTO   El tratamiento de la angina puede incluir lo siguiente:  · Adquirir hábitos saludables para reducir o controlar los factores de riesgo.  · Medicamentos.  · Colocación de stent de arteria coronaria. Un stent ayuda a mantener abierta eleuterio arteria.  · Angioplastia coronaria. En dulce procedimiento se ensancha eleuterio arteria estrechada u obstruida.  · Cirugía de revascularización arterial coronaria. Esta permitirá que la robert pase la obstrucción (revascularización) para llegar al corazón.  INSTRUCCIONES PARA EL CUIDADO EN EL HOGAR   · [place] los medicamentos solamente juan se lo haya indicado el médico.  · No tome los siguientes medicamentos a menos que el médico lo autorice:  ¨ Antiinflamatorios no esteroides (CORAZON), juan ibuprofeno, naproxeno o celecoxib.  ¨ Suplementos vitamínicos que contienen vitamina A, vitamina E o ambas.  ¨ Tratamientos de reposición hormonal que contienen  estrógeno con o sin progestina.  · Controle otros problemas de harjit, juan hipertensión y diabetes, juan se lo haya indicado christie médico.  · Siga eleuterio dieta cardiosaludable. El nutricionista puede darle instrucciones sobre opciones de alimentos saludables y los cambios correspondientes.  · Use métodos de cocción saludables, juan asar, grillar, hervir, hornear, cocer al vapor o saltear. Hable con un nutricionista para aprender más acerca de los métodos de cocción saludables.  · Siga un programa de actividad física aprobado por el médico.  · Mantenga un peso saludable. Baje de peso según se lo indique el médico.  · Planifique períodos de descanso cuando se sienta fatigado.  · Aprenda a manejar el estrés.  · No consuma ningún producto que contenga tabaco, lo que incluye cigarrillos, tabaco de mascar o cigarrillos electrónicos. Si necesita ayuda para dejar de fumar, consulte al médico.  · Si kye alcohol y christie médico lo aprueba, limite la ingesta a no más de 1 medida por día. Eleuterio medida equivale a 12 onzas de cerveza, 5 onzas de vino o 1½ onzas de bebidas alcohólicas de kelly graduación.  · No consuma drogas.  · Concurra a todas las visitas de control juan se lo haya indicado el médico. [place] es importante.  SOLICITE ATENCIÓN MÉDICA DE INMEDIATO SI:   · Siente dolor en el pecho, el wicho, el brazo, la mandíbula, el estómago o la espalda que dure más de unos pocos minutos, el dolor es recurrente o no se maribel con nitroglicerina sublingual.  · Tiene sudoraciones intensas sin causa.  · Tiene estos síntomas sin causa aparente:  ¨ Acidez estomacal o empacho.  ¨ Falta de aire o dificultades respiratorias.  ¨ Náuseas o vómitos.  ¨ Fatiga.  ¨ Nerviosismo o ansiedad.  ¨ Debilidad.  ¨ Diarrea.  · Mareos o sensación de desvanecimiento repentinos.  · Se desmaya.  Estos síntomas pueden representar un problema grave que constituye eleuterio emergencia. No espere hasta que los síntomas desaparezcan. Solicite atención médica de inmediato.  Comuníquese con el servicio de emergencias de christie localidad (911 en los [place]). No conduzca por laura propios medios hasta el hospital.     Esta información no tiene juan fin reemplazar el consejo del médico. Asegúrese de hacerle al médico cualquier pregunta que tenga.     Document Released: 12/18/2006 Document Revised: 01/08/2016  The [employer] Interactive Patient Education ©2016 [employer]      Depression / Suicide Risk    As you are discharged from this [facility] facility, it is important to learn how to keep safe from harming yourself.    Recognize the warning signs:  · Abrupt changes in personality, positive or negative- including increase in energy   · Giving away possessions  · Change in eating patterns- significant weight changes-  positive or negative  · Change in sleeping patterns- unable to sleep or sleeping all the time   · Unwillingness or inability to communicate  · Depression  · Unusual sadness, discouragement and loneliness  · Talk of wanting to die  · Neglect of personal appearance   · Rebelliousness- reckless behavior  · Withdrawal from people/activities they love  · Confusion- inability to concentrate     If you or a loved one observes any of these behaviors or has concerns about self-harm, here's what you can do:  · Talk about it- your feelings and reasons for harming yourself  · Remove any means that you might use to hurt yourself (examples: pills, rope, extension cords, firearm)  · Get professional help from the community (Mental Health, Substance Abuse, psychological counseling)  · Do not be alone:Call your Safe Contact- someone whom you trust who will be there for you.  · Call your local CRISIS HOTLINE 858-4198 or 556-320-2085  · Call your local Children's Mobile Crisis Response Team [place] (134) 320-3056 or www.[employer]  · Call the toll free National Suicide Prevention Hotlines   · National Suicide Prevention Lifeline 519-817-AUVD (8486)  · [employer] Line Network  800-SUICIDE (944-0715)        Follow-up Information     1. Follow up with Suzanna Gilbert PA-C. Go on 8/22/2017.    Why:  PLEASE ARRIVE AT 10:40AM FOR YOUR APPOINTMENT. YOUR PROVIDER WAS UNAVAILABLE SO THIS APPOITNMENT WILL BE WITH SUZANNA GILBERT AT THE [facility] LOCATION.     Contact information    [address] [facility]   [place] [address]  [phone]         Discharge Medication Instructions:    Below are the medications your physician expects you to take upon discharge:    Review all your home medications and newly ordered medications with your doctor and/or pharmacist. Follow medication instructions as directed by your doctor and/or pharmacist.    Please keep your medication list with you and share with your physician.               Medication List      START taking these medications        Instructions    Morning Afternoon Evening Bedtime    fish oil 1000 MG Caps capsule   Last time this was given:  1,000 mg on 8/18/2017 11:08 AM        Take 1 Cap by mouth 3 times a day, with meals.   Dose:  1000 mg                          CONTINUE taking these medications        Instructions    Morning Afternoon Evening Bedtime    albuterol 108 (90 Base) MCG/ACT Aers inhalation aerosol        Inhale 2 Puffs by mouth every 6 hours as needed for Shortness of Breath.   Dose:  2 Puff                        aspirin 81 MG Chew chewable tablet   Last time this was given:  162 mg on 8/17/2017  3:25 PM   Commonly known as:  ASA        Take 1 Tab by mouth every day.   Dose:  81 mg                        atorvastatin 20 MG Tabs   Last time this was given:  20 mg on 8/17/2017  9:03 PM   Commonly known as:  LIPITOR        Take 1 Tab by mouth every day.   Dose:  20 mg                        KAZANO 12.5-1000 MG Tabs   Generic drug:  Alogliptin-Metformin HCl        Take 1 Tab by mouth every morning.   Dose:  1 Tab                        metformin  MG Tb24   Last time this was given:  1,000 mg on 8/17/2017  9:07 PM   Commonly known as:   GLUCOPHAGE XR        Take 2 Tabs by mouth every evening.   Dose:  1000 mg                        metoprolol SR 25 MG Tb24   Last time this was given:  25 mg on 8/18/2017 11:08 AM   Commonly known as:  TOPROL XL        Take 1 Tab by mouth every day.   Dose:  25 mg                        pantoprazole 40 MG Tbec   Commonly known as:  PROTONIX        Take 40 mg by mouth every day.   Dose:  40 mg                             Where to Get Your Medications      You can get these medications from any pharmacy     Bring a paper prescription for each of these medications    - fish oil 1000 MG Caps capsule            Instructions           Diet / Nutrition:    Follow any diet instructions given to you by your doctor or the dietician, including how much salt (sodium) you are allowed each day.    If you are overweight, talk to your doctor about a weight reduction plan.    Activity:    Remain physically active following your doctor's instructions about exercise and activity.    Rest often.     Any time you become even a little tired or short of breath, SIT DOWN and rest.    Worsening Symptoms:    Report any of the following signs and symptoms to the doctor's office immediately:    *Pain of jaw, arm, or neck  *Chest pain not relieved by medication                               *Dizziness or loss of consciousness  *Difficulty breathing even when at rest   *More tired than usual                                       *Bleeding drainage or swelling of surgical site  *Swelling of feet, ankles, legs or stomach                 *Fever (>100ºF)  *Pink or blood tinged sputum  *Weight gain (3lbs/day or 5lbs /week)           *Shock from internal defibrillator (if applicable)  *Palpitations or irregular heartbeats                *Cool and/or numb extremities    Stroke Awareness    Common Risk Factors for Stroke include:    Age  Atrial Fibrillation  Carotid Artery Stenosis  Diabetes Mellitus  Excessive alcohol consumption  High blood  pressure  Overweight   Physical inactivity  Smoking    Warning signs and symptoms of a stroke include:    *Sudden numbness or weakness of the face, arm or leg (especially on one side of the body).  *Sudden confusion, trouble speaking or understanding.  *Sudden trouble seeing in one or both eyes.  *Sudden trouble walking, dizziness, loss of balance or coordination.Sudden severe headache with no known cause.    It is very important to get treatment quickly when a stroke occurs. If you experience any of the above warning signs, call 911 immediately.                   Disclaimer         Quit Smoking / Tobacco Use:    I understand the use of any tobacco products increases my chance of suffering from future heart disease or stroke and could cause other illnesses which may shorten my life. Quitting the use of tobacco products is the single most important thing I can do to improve my health. For further information on smoking / tobacco cessation call a Toll Free Quit Line at 1-132.687.8532 (*National Cancer [place]) or 1-320.897.4670 (American Lung Association) or you can access the web based program at www.lungXiant.org.    [place] Tobacco Users Help Line:  (438) 101-4949       Toll Free: 1-473.220.4564  Quit Tobacco Program [facility] (454)642-3987    Crisis Hotline:    [place] Crisis Hotline:  5-179-IALUZGA or 1-659.163.8138    [place] Crisis Hotline:    1-534.660.5672 or 567-196-4389    Discharge Survey:   Thank you for choosing [facility]. We hope we did everything we could to make your hospital stay a pleasant one. You may be receiving a phone survey and we would appreciate your time and participation in answering the questions. Your input is very valuable to us in our efforts to improve our service to our patients and their families.        My signature on this form indicates that:    1. I have reviewed and understand the above information.  2. My questions regarding this information have  been answered to my satisfaction.  3. I have formulated a plan with my discharge nurse to obtain my prescribed medications for home.                  Disclaimer         __________________________________                     __________       ________                       Patient Signature                                                 Date                    Time

## 2017-08-17 NOTE — IP AVS SNAPSHOT
Help Remedies Access Code: [email or web address]  Expires: 9/17/2017  1:04 PM    Your email address is not on file at [facility].  Email Addresses are required for you to sign up for Help Remedies, please contact [phone] to verify your personal information and to provide your email address prior to attempting to register for Help Remedies.    Shahida Price  [address] [place] [address]    Help Remedies  A secure, online tool to manage your health information     [facility]’s Help Remedies® is a secure, online tool that connects you to your personalized health information from the privacy of your home -- day or night - making it very easy for you to manage your healthcare. Once the activation process is completed, you can even access your medical information using the Help Remedies satish, which is available for free in the Apple Satish store or Google Play store.     To learn more about Help Remedies, visit [email or web address] [email or web address]    There are two levels of access available (as shown below):   My Chart Features  [facility] Primary Care Doctor [facility]  Specialists [facility]  Urgent  Care Non-[facility] Primary Care Doctor   Email your healthcare team securely and privately 24/7 X X X    Manage appointments: schedule your next appointment; view details of past/upcoming appointments X      Request prescription refills. X      View recent personal medical records, including lab and immunizations X X X X   View health record, including health history, allergies, medications X X X X   Read reports about your outpatient visits, procedures, consult and ER notes X X X X   See your discharge summary, which is a recap of your hospital and/or ER visit that includes your diagnosis, lab results, and care plan X X  X     How to register for [facility]:  Once your e-mail address has been verified, follow the following steps to sign up for [facility].     1. Go to  [email or web address]  2. Click on the Sign Up Now box, which takes you to the New Member Sign Up  page. You will need to provide the following information:  a. Enter your SilverPush Access Code exactly as it appears at the top of this page. (You will not need to use this code after you’ve completed the sign-up process. If you do not sign up before the expiration date, you must request a new code.)   b. Enter your date of birth.   c. Enter your home email address.   d. Click Submit, and follow the next screen’s instructions.  3. Create a SilverPush ID. This will be your SilverPush login ID and cannot be changed, so think of one that is secure and easy to remember.  4. Create a SilverPush password. You can change your password at any time.  5. Enter your Password Reset Question and Answer. This can be used at a later time if you forget your password.   6. Enter your e-mail address. This allows you to receive e-mail notifications when new information is available in SilverPush.  7. Click Sign Up. You can now view your health information.    For assistance activating your SilverPush account, call [phone]

## 2017-08-17 NOTE — H&P
Hospital Medicine History and Physical    Date of Service  8/17/2017    Chief Complaint  Chief Complaint   Patient presents with   • Chest Pain       History of Presenting Illness  57 y.o. female who presented 8/17/2017 with acute chest pain that started around noon today, it is a pressure with a squeezing component, it is 8 out of 10 in intensity, accompanied by shortness of breath and diaphoresis. Patient this point will be admitted to the telemetric unit, we will evaluate her chest pressure with serial troponins and the stress test in the morning. Patient has been also complaining of dyspnea and this is very unusual for her this is only been going on for about the past 3-4 days I will check an echocardiogram she also complains of worsening swelling in the legs she may benefit from Lasix at this point. Patient will be observation admission to the telemetric unit.    Primary Care Physician  Lorenzo Santacruz, N.P.    Consultants  None    Code Status  Full code    Review of Systems  ROS complaints at this point of chest pressure 8 out of 10 intensity, complaints of shortness of breath, complains of diaphoresis, complaints of nausea, complains of weakness. Denies any headache, any abdominal pain, any dizziness all other review of systems were reviewed and are negative.    Past Medical History  Past Medical History   Diagnosis Date   • Diabetes (CMS-HCC)    • Depression    • Gastritis    • Atopic dermatitis    • Hypertension    • Allergy    • Hyperlipidemia        Surgical History  Past Surgical History   Procedure Laterality Date   • Cholecystectomy  2007   • Other orthopedic surgery  2007     shoulder surgery       Medications  No current facility-administered medications on file prior to encounter.     Current Outpatient Prescriptions on File Prior to Encounter   Medication Sig Dispense Refill   • Alogliptin-Metformin HCl (KAZANO) 12.5-1000 MG Tab Take 1 Tab by mouth every morning.     • metformin ER (GLUCOPHAGE XR)  500 MG TABLET SR 24 HR Take 2 Tabs by mouth every evening. 30 Tab    • albuterol 108 (90 BASE) MCG/ACT Aero Soln inhalation aerosol Inhale 2 Puffs by mouth every 6 hours as needed for Shortness of Breath.     • pantoprazole (PROTONIX) 40 MG Tablet Delayed Response Take 40 mg by mouth every day.     • metoprolol SR (TOPROL XL) 25 MG TABLET SR 24 HR Take 1 Tab by mouth every day. 30 Tab 3   • aspirin (ASA) 81 MG Chew Tab chewable tablet Take 1 Tab by mouth every day. 100 Tab 3   • atorvastatin (LIPITOR) 20 MG Tab Take 1 Tab by mouth every day. 30 Tab 3       Family History  Family History   Problem Relation Age of Onset   • Cancer Mother      lung cancer, chronic smoker   • Lung Disease Mother    • Diabetes Father        Social History  Social History   Substance Use Topics   • Smoking status: Former Smoker -- 1.00 packs/day for 15 years     Quit date: 2002   • Smokeless tobacco: Never Used   • Alcohol Use: No      Comment: 1 beer / month       Allergies  No Known Allergies     Physical Exam  Laboratory   Hemodynamics  Temp (24hrs), Av.7 °C (98 °F), Min:36.7 °C (98 °F), Max:36.7 °C (98 °F)   Temperature: 36.7 °C (98 °F)  Pulse  Av  Min: 82  Max: 90 Heart Rate (Monitored): 81  Blood Pressure: (!) 164/89 mmHg, NIBP: 128/69 mmHg      Respiratory      Respiration: 16, Pulse Oximetry: 95 %             Physical Exam  She is currently alert awake oriented pleasant female 57 years old with no acute distress.  Head is atraumatic eyes following normal range of gaze, pupils are equal round and reactive bilaterally, sclerae anicteric, conjunctiva is of normal hue.  Nose is midline without discharge no bleeding from the nose and no nasal fracture appreciated.  Ears bilaterally intact with no discharge from the ears no mastoid tenderness external ears appear normal there is no abnormal ear canal.  Oral cavity moist extremities no peripheral infection in the oral cavity tongue and uvula are midline. Neck soft supple  no JVD carotid bruits thyromegaly or lymphadenopathy appreciated.  Chest lungs equal with inspiration and expiration there is reproducible chest wall tenderness on the left side of the chest.  Heart S1-S2 no murmurs or gallops detected normal sinus rhythm.  Lungs clear to auscultation no rales or rhonchi detected.  Abdomen is soft bowel sounds present all 4 quadrants no hepatomegaly or splenomegaly rebound or tenderness elicited.  Gel to exam deferred rectal exam deferred.  Upper and lower extremities positive pulses there is +1 edema both lower extremities there is a little bit of tenderness and tightness in the calves. Good muscle strength and good muscle tone positive deep tendon reflexes.  Neurologically cranial nerves II-12 grossly within normal limits no focal deficits patient.  Skin normal turgor no rashes or lesions identified.   Recent Labs      08/17/17   1450   WBC  8.0   RBC  4.87   HEMOGLOBIN  13.7   HEMATOCRIT  40.4   MCV  83.0   MCH  28.1   MCHC  33.9   RDW  42.0   PLATELETCT  243   MPV  10.3     Recent Labs      08/17/17   1450   SODIUM  138   POTASSIUM  3.5*   CHLORIDE  105   CO2  24   GLUCOSE  159*   BUN  10   CREATININE  0.53   CALCIUM  9.4     Recent Labs      08/17/17   1450   ALTSGPT  42   ASTSGOT  29   ALKPHOSPHAT  119*   TBILIRUBIN  0.6   LIPASE  36   GLUCOSE  159*     Recent Labs      08/17/17   1450   APTT  29.8   INR  0.91     Recent Labs      08/17/17   1450   BNPBTYPENAT  7         Lab Results   Component Value Date    TROPONINI <0.02 08/17/2017       Imaging  DX-CHEST-PORTABLE (1 VIEW)   Final Result      No radiographic evidence of acute cardiopulmonary process.      NM-CARDIAC STRESS TEST    (Results Pending)   ECHOCARDIOGRAM COMP W/O CONT    (Results Pending)      Assessment/Plan     I anticipate this patient is appropriate for observation status at this time.    * Angina pectoris (CMS-HCC) (present on admission)  Assessment & Plan  Patient will have a cardiac workup. I will get an  echocardiogram as she is having some fluid retention and some shortness of breath with dyspnea. We'll check 3 sets of cardiac markers followed by a stress test in the morning. We will optimize her with aspirin as well as beta blocker. Further decision making will be after her stress test is complete.    Essential hypertension (present on admission)  Assessment & Plan  Continue with blood pressure managing unit using metoprolol XL 25 mg daily with holding parameters.  Patient will be managed PRN with labetalol 10 mg every 4 hours if her systolic blood pressures above 150 diastolic above 100.    Mood disorder (CMS-HCC) (present on admission)  Assessment & Plan  Currently patient is not medicated for her mood disorder and she is stable.    Type 2 diabetes mellitus (HCC) (present on admission)  Assessment & Plan  -accus with sliding scale coverage  -diabetic diet  -diabetic education  -follow glycohemoglobin levels long term most recent hemoglobin A1c is not available checked.  -continue with oral antihyperglycemics she uses metformin as an outpatient. Continue in-house.  -monitor for hypoglycemic episodes and adjust control if he should get low    Dyslipidemia (present on admission)  Assessment & Plan  Low-fat low-cholesterol diet, check a fasting lipid panel, continue with Lipitor 20 mg by mouth daily at bedtime.      VTE prophylaxis: Lovenox .

## 2017-08-17 NOTE — ED NOTES
Presents with a C/O acute onset of chest pain since approximately 1230 today.  Reports similar occurrences in recent history.

## 2017-08-17 NOTE — ED PROVIDER NOTES
ED Provider Note    CHIEF COMPLAINT  Chief Complaint   Patient presents with   • Chest Pain       HPI  Shahida Price is a 57 y.o. female with a history of hypertension, hypercholesterolemia, diabetes mellitus, atopic dermatitis who presents with complaint of chest pain and chest pressure since 12:30 this afternoon. The patient says over the last several weeks she has had some episodes of similar chest pain which resolved after 30 minutes to a few hours. She says she was at rest today when she developed pain across her chest without radiation into her neck, jaw, arms, or back. She does none the past she has had some numbness and tingling to her extremities with the chest pain.  The patient denies any. His cardiac history, denies any tobacco use. She says her mother had heart problems. She denies fever, chills, sore throat, cough, vomiting, or diarrhea.    REVIEW OF SYSTEMS  See HPI for further details. All other systems are negative.     PAST MEDICAL HISTORY  Past Medical History   Diagnosis Date   • Diabetes (CMS-HCC)    • Depression    • Gastritis    • Atopic dermatitis    • Hypertension    • Allergy    • Hyperlipidemia        FAMILY HISTORY  Family History   Problem Relation Age of Onset   • Cancer Mother      lung cancer, chronic smoker   • Lung Disease Mother    • Diabetes Father        SOCIAL HISTORY  Social History     Social History   • Marital Status: Legally      Spouse Name: N/A   • Number of Children: N/A   • Years of Education: N/A     Social History Main Topics   • Smoking status: Former Smoker -- 1.00 packs/day for 15 years     Quit date: 04/17/2002   • Smokeless tobacco: Never Used   • Alcohol Use: No      Comment: 1 beer / month   • Drug Use: No   • Sexual Activity: Not Asked      Comment: menopause 2015     Other Topics Concern   • None     Social History Narrative       SURGICAL HISTORY  Past Surgical History   Procedure Laterality Date   • Cholecystectomy  2007   • Other orthopedic surgery   "2007     shoulder surgery       CURRENT MEDICATIONS  Home Medications     Reviewed by Stephen Quigley (Pharmacy Tech) on 08/17/17 at 1419  Med List Status: Complete    Medication Last Dose Status    albuterol 108 (90 BASE) MCG/ACT Aero Soln inhalation aerosol 8/16/2017 Active    Alogliptin-Metformin HCl (KAZANO) 12.5-1000 MG Tab 8/17/2017 Active    aspirin (ASA) 81 MG Chew Tab chewable tablet 8/17/2017 Active    atorvastatin (LIPITOR) 20 MG Tab 8/16/2017 Active    metformin ER (GLUCOPHAGE XR) 500 MG TABLET SR 24 HR 8/16/2017 Active    metoprolol SR (TOPROL XL) 25 MG TABLET SR 24 HR 8/17/2017 Active    pantoprazole (PROTONIX) 40 MG Tablet Delayed Response 8/17/2017 Active                ALLERGIES  No Known Allergies    PHYSICAL EXAM  VITAL SIGNS: /89 mmHg  Pulse 90  Temp(Src) 36.7 °C (98 °F)  Resp 18  Ht 1.549 m (5' 1\")  Wt 72 kg (158 lb 11.7 oz)  BMI 30.01 kg/m2  SpO2 98%  Constitutional: Awake, alert, in no acute distress, Non-toxic appearance.   HENT: Atraumatic. Bilateral external ears normal, mucous membranes moist, throat nonerythematous without exudates, nose is normal.  Eyes: PERRL, EOMI, conjunctiva moist, noninjected.  Neck: Nontender, Normal range of motion, No nuchal rigidity, No stridor.   Lymphatic: No lymphadenopathy noted.   Cardiovascular: Regular rate and rhythm, no murmurs, rubs, gallops.  Thorax & Lungs:  Good breath sounds bilaterally, no wheezes, rales, or retractions.  No chest tenderness.  Abdomen: Bowel sounds normal, Soft, nontender, nondistended, no rebound, guarding, masses.  Back: No CVA or spinal tenderness.  Extremities: Intact distal pulses, No edema, No tenderness.   Skin: Warm, Dry, No rashes.   Musculoskeletal: No joint swelling or tenderness.  Neurologic: Alert & oriented x 3, sensory and motor function normal. No focal deficits.   Psychiatric: Affect normal, Judgment normal, Mood normal.     EKG  Twelve-lead EKG shows normal sinus rhythm, rate of 92, normal " intervals, normal axis, no acute ST-T wave elevations or ST depressions, Q waves in leads 3, aVF, there is no evidence of an acute injury or ischemic pattern on my reading, there is no please EKG for comparison.        RADIOLOGY/PROCEDURES  DX-CHEST-PORTABLE (1 VIEW)   Final Result      No radiographic evidence of acute cardiopulmonary process.      NM-CARDIAC STRESS TEST    (Results Pending)   ECHOCARDIOGRAM COMP W/O CONT    (Results Pending)         COURSE & MEDICAL DECISION MAKING  Pertinent Labs & Imaging studies reviewed. (See chart for details)  The patient presents with the above complaints. She has multiple risk factors for coronary artery disease. She had taken a baby aspirin earlier, was given 2 additional baby aspirin. She declined any further pain medication. EKG shows a normal sinus rhythm. Chest x-ray shows no acute cardiopulmonary abnormalities. CBC is normal, chemistry shows a potassium 3.5, glucose 159, otherwise normal, troponin less than 0.02, BNP 7. TSH normal at 2.95. Findings were discussed with the patient and her family. Given her symptoms and risk factors, patient will be admitted. Case was discussed with Dr. Guzman.      FINAL IMPRESSION  1. Chest pain  2.   3.         Electronically signed by: Lorne Mcmahon, 8/17/2017 2:46 PM

## 2017-08-18 ENCOUNTER — APPOINTMENT (OUTPATIENT)
Dept: RADIOLOGY | Facility: MEDICAL CENTER | Age: 57
End: 2017-08-18
Attending: HOSPITALIST
Payer: MEDICAID

## 2017-08-18 ENCOUNTER — PATIENT OUTREACH (OUTPATIENT)
Dept: HEALTH INFORMATION MANAGEMENT | Facility: OTHER | Age: 57
End: 2017-08-18

## 2017-08-18 VITALS
SYSTOLIC BLOOD PRESSURE: 149 MMHG | RESPIRATION RATE: 16 BRPM | HEART RATE: 86 BPM | OXYGEN SATURATION: 97 % | BODY MASS INDEX: 30.8 KG/M2 | HEIGHT: 61 IN | DIASTOLIC BLOOD PRESSURE: 82 MMHG | WEIGHT: 163.14 LBS | TEMPERATURE: 97.7 F

## 2017-08-18 LAB
ANION GAP SERPL CALC-SCNC: 10 MMOL/L (ref 0–11.9)
BUN SERPL-MCNC: 15 MG/DL (ref 8–22)
CALCIUM SERPL-MCNC: 9 MG/DL (ref 8.4–10.2)
CHLORIDE SERPL-SCNC: 106 MMOL/L (ref 96–112)
CHOLEST SERPL-MCNC: 135 MG/DL (ref 100–199)
CO2 SERPL-SCNC: 23 MMOL/L (ref 20–33)
CREAT SERPL-MCNC: 0.56 MG/DL (ref 0.5–1.4)
ERYTHROCYTE [DISTWIDTH] IN BLOOD BY AUTOMATED COUNT: 42.9 FL (ref 35.9–50)
GFR SERPL CREATININE-BSD FRML MDRD: >60 ML/MIN/1.73 M 2
GLUCOSE BLD-MCNC: 157 MG/DL (ref 65–99)
GLUCOSE SERPL-MCNC: 156 MG/DL (ref 65–99)
HCT VFR BLD AUTO: 40.8 % (ref 37–47)
HDLC SERPL-MCNC: 30 MG/DL
HGB BLD-MCNC: 13.5 G/DL (ref 12–16)
LDLC SERPL CALC-MCNC: 34 MG/DL
LV EJECT FRACT  99904: 70
LV EJECT FRACT MOD 2C 99903: 68.85
LV EJECT FRACT MOD 4C 99902: 69.05
LV EJECT FRACT MOD BP 99901: 68.58
MCH RBC QN AUTO: 28.4 PG (ref 27–33)
MCHC RBC AUTO-ENTMCNC: 33.1 G/DL (ref 33.6–35)
MCV RBC AUTO: 85.7 FL (ref 81.4–97.8)
PLATELET # BLD AUTO: 232 K/UL (ref 164–446)
PMV BLD AUTO: 10.6 FL (ref 9–12.9)
POTASSIUM SERPL-SCNC: 4 MMOL/L (ref 3.6–5.5)
RBC # BLD AUTO: 4.76 M/UL (ref 4.2–5.4)
SODIUM SERPL-SCNC: 139 MMOL/L (ref 135–145)
TRIGL SERPL-MCNC: 353 MG/DL (ref 0–149)
TROPONIN I SERPL-MCNC: <0.02 NG/ML (ref 0–0.04)
WBC # BLD AUTO: 7.3 K/UL (ref 4.8–10.8)

## 2017-08-18 PROCEDURE — A9502 TC99M TETROFOSMIN: HCPCS

## 2017-08-18 PROCEDURE — 85027 COMPLETE CBC AUTOMATED: CPT

## 2017-08-18 PROCEDURE — G0378 HOSPITAL OBSERVATION PER HR: HCPCS

## 2017-08-18 PROCEDURE — 80061 LIPID PANEL: CPT

## 2017-08-18 PROCEDURE — A9270 NON-COVERED ITEM OR SERVICE: HCPCS | Performed by: FAMILY MEDICINE

## 2017-08-18 PROCEDURE — 700102 HCHG RX REV CODE 250 W/ 637 OVERRIDE(OP): Performed by: HOSPITALIST

## 2017-08-18 PROCEDURE — 93306 TTE W/DOPPLER COMPLETE: CPT | Mod: 26 | Performed by: INTERNAL MEDICINE

## 2017-08-18 PROCEDURE — 80048 BASIC METABOLIC PNL TOTAL CA: CPT

## 2017-08-18 PROCEDURE — 93306 TTE W/DOPPLER COMPLETE: CPT

## 2017-08-18 PROCEDURE — 700102 HCHG RX REV CODE 250 W/ 637 OVERRIDE(OP): Performed by: FAMILY MEDICINE

## 2017-08-18 PROCEDURE — 99217 PR OBSERVATION CARE DISCHARGE: CPT | Performed by: FAMILY MEDICINE

## 2017-08-18 PROCEDURE — 84484 ASSAY OF TROPONIN QUANT: CPT

## 2017-08-18 PROCEDURE — A9270 NON-COVERED ITEM OR SERVICE: HCPCS | Performed by: HOSPITALIST

## 2017-08-18 PROCEDURE — 700111 HCHG RX REV CODE 636 W/ 250 OVERRIDE (IP)

## 2017-08-18 PROCEDURE — 82962 GLUCOSE BLOOD TEST: CPT

## 2017-08-18 RX ORDER — CHLORAL HYDRATE 500 MG
1000 CAPSULE ORAL
Status: DISCONTINUED | OUTPATIENT
Start: 2017-08-18 | End: 2017-08-18 | Stop reason: HOSPADM

## 2017-08-18 RX ORDER — CHLORAL HYDRATE 500 MG
1000 CAPSULE ORAL
Qty: 90 CAP | Refills: 0 | Status: ON HOLD | OUTPATIENT
Start: 2017-08-18 | End: 2018-04-25

## 2017-08-18 RX ORDER — AMINOPHYLLINE 25 MG/ML
INJECTION, SOLUTION INTRAVENOUS
Status: COMPLETED
Start: 2017-08-18 | End: 2017-08-18

## 2017-08-18 RX ORDER — REGADENOSON 0.08 MG/ML
INJECTION, SOLUTION INTRAVENOUS
Status: COMPLETED
Start: 2017-08-18 | End: 2017-08-18

## 2017-08-18 RX ADMIN — OMEPRAZOLE 20 MG: 20 CAPSULE, DELAYED RELEASE ORAL at 11:08

## 2017-08-18 RX ADMIN — ASPIRIN 325 MG ORAL TABLET 325 MG: 325 PILL ORAL at 11:08

## 2017-08-18 RX ADMIN — INSULIN LISPRO 3 UNITS: 100 INJECTION, SOLUTION INTRAVENOUS; SUBCUTANEOUS at 06:09

## 2017-08-18 RX ADMIN — METOPROLOL SUCCINATE 25 MG: 25 TABLET, EXTENDED RELEASE ORAL at 11:08

## 2017-08-18 RX ADMIN — AMINOPHYLLINE 100 MG: 25 INJECTION, SOLUTION INTRAVENOUS at 09:44

## 2017-08-18 RX ADMIN — Medication 1000 MG: at 11:08

## 2017-08-18 RX ADMIN — REGADENOSON 0.4 MG: 0.08 INJECTION, SOLUTION INTRAVENOUS at 09:39

## 2017-08-18 ASSESSMENT — PAIN SCALES - GENERAL
PAINLEVEL_OUTOF10: 0
PAINLEVEL_OUTOF10: 1

## 2017-08-18 NOTE — PROGRESS NOTES
Telemetry Summary    Rhythm Interpretation: Sinus Rhythm  Heart Rate: 85  NM Interval: 0.20  QRS Interval: 0.08  QT Interval: 0.36

## 2017-08-18 NOTE — CARE PLAN
Problem: Safety  Goal: Will remain free from injury  Outcome: PROGRESSING AS EXPECTED  Bed in low and locked position.  Side rails up x2.  Call light and belongings remain in reach of patient.  Patient verbalizes understanding of calling for assistance with ambulation.  Hourly rounding continues.    Problem: Pain Management  Goal: Pain level will decrease to patient’s comfort goal    08/17/17 1600 08/17/17 1937   OTHER   Nurse Pain Scale 0 - 10  --  7   Comfort Goal 0 --

## 2017-08-18 NOTE — PROGRESS NOTES
Gave report to change of shift RN. Plan of care discussed. Safety precautions in place. Personal belongings and call light with in reach. Patient has no other needs at this time.

## 2017-08-18 NOTE — PROGRESS NOTES
8/18/17, 1140- Patient resting in room following stress test. Test results were negative for any noted ischemia. Patient awaiting echocardiogram prior to discharge home.

## 2017-08-18 NOTE — PROGRESS NOTES
Tele strip at 1917 shows sinus rhythm w/ HR of 79.     Measurements: 0.18 / 0.08 / 0.38    Tele Shift Summary:    Rhythm : SR  Rate : 70-80    Ectopy : Per CCT Monica, pt had no ectopy.     Telemetry monitoring strips placed in pt chart.

## 2017-08-18 NOTE — CARE PLAN
Problem: Infection  Goal: Will remain free from infection  Outcome: PROGRESSING AS EXPECTED  Utilize standard precaution according to protocol. Teach importance of  hand hygiene to patient and family. Assess for signs and symptoms of infection.    Problem: Knowledge Deficit  Goal: Knowledge of disease process/condition, treatment plan, diagnostic tests, and medications will improve  Outcome: PROGRESSING AS EXPECTED  Assess understanding regarding disease process, treatment, and medication administration. Patient will verbalize understanding of treatment, diagnostic testing, and medication administration.

## 2017-08-18 NOTE — DISCHARGE SUMMARY
CHIEF COMPLAINT ON ADMISSION  Chief Complaint   Patient presents with   • Chest Pain       CODE STATUS  Full Code    HPI & HOSPITAL COURSE  This is a 57 y.o. female here with ATYPICAL CHEST PAIN,SHE FEELS BETTER.IF HER NUCLEAR CARDIAC TEST AND HER CARDIAC ECHO ARE  NEGATIVE SHE CAN BE DISCHARGED HOME.    HYPERTRIGLYCERDIEMIA  FISH OIL 1GRAM PO TID    Therefore, she is discharged in good and stable condition with close outpatient follow-up.    SPECIFIC OUTPATIENT FOLLOW-UP  PCP THIS COMING WEEK    DISCHARGE PROBLEM LIST  Principal Problem:    Angina pectoris (CMS-HCC) POA: Yes  Active Problems:    Essential hypertension POA: Yes    Mood disorder (CMS-HCC) POA: Yes    Type 2 diabetes mellitus (HCC) POA: Yes    Dyslipidemia POA: Yes  Resolved Problems:    * No resolved hospital problems. *      FOLLOW UP  No future appointments.  Suzanna Gilbert PA-C  [address] [place] [id number]  [phone]  Go on 8/22/2017  PLEASE ARRIVE AT 10:40AM FOR YOUR APPOINTMENT. YOUR PROVIDER WAS UNAVAILABLE SO THIS APPOITNMENT WILL BE WITH SUZANNA GILBERT AT THE [facility] LOCATION.       MEDICATIONS ON DISCHARGE   Shahida Price   Home Medication Instructions TASIA:[id number]    Printed on:08/18/17 0938   Medication Information                      albuterol 108 (90 BASE) MCG/ACT Aero Soln inhalation aerosol  Inhale 2 Puffs by mouth every 6 hours as needed for Shortness of Breath.             Alogliptin-Metformin HCl (KAZANO) 12.5-1000 MG Tab  Take 1 Tab by mouth every morning.             aspirin (ASA) 81 MG Chew Tab chewable tablet  Take 1 Tab by mouth every day.             atorvastatin (LIPITOR) 20 MG Tab  Take 1 Tab by mouth every day.             metformin ER (GLUCOPHAGE XR) 500 MG TABLET SR 24 HR  Take 2 Tabs by mouth every evening.             metoprolol SR (TOPROL XL) 25 MG TABLET SR 24 HR  Take 1 Tab by mouth every day.             pantoprazole (PROTONIX) 40 MG Tablet Delayed Response  Take 40 mg by mouth every day.                  DIET  Orders Placed This Encounter   Procedures   • Protocol 1313 Diet Order: Reg, No Decaf, No Caffeine - Cardiac Stress Test Pharmacological     Standing Status: Standing      Number of Occurrences: 1      Standing Expiration Date:      Order Specific Question:  Diet:     Answer:  Regular [1]     Order Specific Question:  Miscellaneous modifications:     Answer:  No Decaf, No Caffeine(for test) [11]      Comments:  Protocol 1313 Patient to have no caffeine for 12 hours prior to exam (decaf, coffee, cola, tea, chocolate)       ACTIVITY  As tolerated.  Weight bearing as tolerated      CONSULTATIONS  NONE    PROCEDURES  NONE    LABORATORY  Lab Results   Component Value Date/Time    SODIUM 139 08/18/2017 04:07 AM    POTASSIUM 4.0 08/18/2017 04:07 AM    CHLORIDE 106 08/18/2017 04:07 AM    CO2 23 08/18/2017 04:07 AM    GLUCOSE 156* 08/18/2017 04:07 AM    BUN 15 08/18/2017 04:07 AM    CREATININE 0.56 08/18/2017 04:07 AM        Lab Results   Component Value Date/Time    WBC 7.3 08/18/2017 04:07 AM    HEMOGLOBIN 13.5 08/18/2017 04:07 AM    HEMATOCRIT 40.8 08/18/2017 04:07 AM    PLATELET COUNT 232 08/18/2017 04:07 AM        Total time of the discharge process exceeds 32 minutes

## 2017-08-18 NOTE — DISCHARGE INSTRUCTIONS
Discharge Instructions    Discharged to home by car with relative. Discharged via wheelchair, hospital escort: Yes.  Special equipment needed: Not Applicable    Be sure to schedule a follow-up appointment with your primary care doctor or any specialists as instructed.     Discharge Plan:   Influenza Vaccine Indication: Patient Refuses    I understand that a diet low in cholesterol, fat, and sodium is recommended for good health. Unless I have been given specific instructions below for another diet, I accept this instruction as my diet prescription.   Other diet: Low Fat, Low Cholesterol    Special Instructions: None    · Is patient discharged on Warfarin / Coumadin?   No     · Is patient Post Blood Transfusion?  No    Angina de pecho  (Angina Pectoris)  La angina de pecho, a menudo llamada simplemente angina, es eleuterio sensación de molestia extrema en el pecho, el wicho o el brazo, causada por la falta de robert en la capa media y más gruesa de la pared del corazón (miocardio). Hay cuatro tipos de angina de pecho.  · Angina estable. La frecuencia y la duración de los episodios de angina estable son predecibles. Por lo general, la causa es la actividad física, el estrés emocional o la excitación. La angina estable suele durar unos pocos minutos y a menudo se maribel tomando un medicamento que se coloca debajo de la lengua, denominado nitroglicerina sublingual.  · Angina inestable. La angina inestable puede ocurrir aun cuando el cuerpo realiza poco o ningún esfuerzo físico, e incluso mientras duerme o descansa. Puede aumentar de repente en gravedad o frecuencia. No se puede aliviar con nitroglicerina sublingual y puede durar hasta 30 minutos.  · Angina microvascular. Paola tipo de angina se produce por un trastorno de los vasos sanguíneos diminutos llamados arteriolas. Es más común en las mujeres. El dolor puede ser más intenso y durar más que otros tipos de angina de pecho.  · Angina de Prinzmetal o angina variante. Paola  tipo de angina de pecho es poco frecuente y por lo general ocurre cuando realiza poco o ningún esfuerzo físico. Ocurre sobre todo en las primeras horas de la mañana.  CAUSAS  La ateroesclerosis causa la angina. Es la acumulación de grasa y colesterol (placa) en el interior de las arterias. Con el tiempo, la placa puede estrechar u obstruir la arteria y esto reducirá el flujo sanguíneo al corazón. La placa también puede debilitarse y desprenderse en eleuterio arteria coronaria para formar un coágulo y causar eleuterio obstrucción repentina.  FACTORES DE RIESGO  Los factores de riesgo comunes a hombres y mujeres incluyen lo siguiente:  · Niveles elevados de colesterol.  · Presión arterial elevada (hipertensión).  · Consumo de tabaco.  · Diabetes.  · Antecedentes familiares de angina.  · Obesidad.  · La falta de actividad física.  · Eleuterio dieta con alto contenido de grasas saturadas.  Las mujeres presentan un riesgo más alto de tener angina si:  · Tienen de 55 años.  · Están en la posmenopausia.  SÍNTOMAS  Muchas personas no presentan síntomas servando las primeras etapas de la angina. A medida que la afección progresa, los síntomas comunes a hombres y mujeres pueden incluir lo siguiente:  · Dolor en el pecho.  ¨ Se describe juan un dolor que aplasta o retuerce el pecho, o eleuterio opresión, presión, distensión o pesadez en el pecho.  ¨ El dolor puede durar más de unos minutos o puede detenerse y volver a presentarse.  · Dolor en los brazos, el wicho, la mandíbula o la espalda.  · Acidez estomacal o empacho sin causa aparente.  · Falta de aire.  · Náuseas.  · Sudor frío repentino.  · Sensación repentina de desvanecimiento.  Muchas mujeres tienen molestias en el pecho y algunos de los otros síntomas. Sin embargo, a menudo presentan síntomas diferentes (atípicos), juan:   · Fatiga.  · Sensación inexplicable de nerviosismo o ansiedad.  · Debilidad sin causa aparente.  · Mareos o desmayos.  En ocasiones, las mujeres pueden tener angina sin  presentar síntomas.  DIAGNÓSTICO   Entre los estudios para diagnosticar la angina se incluyen los siguientes:  · ECG (electrocardiograma).  · Prueba de esfuerzo. Se utiliza para detectar signos de obstrucción cuando el corazón se somete a ejercicio.  · Prueba de esfuerzo farmacológica. Se utiliza para detectar signos de obstrucción cuando el corazón se somete a esfuerzo mediante el uso de un medicamento.  · Análisis de robert.  · Angiografía coronaria. En dulce procedimiento se detecta si hay obstrucción en las arterias coronarias.  TRATAMIENTO   El tratamiento de la angina puede incluir lo siguiente:  · Adquirir hábitos saludables para reducir o controlar los factores de riesgo.  · Medicamentos.  · Colocación de stent de arteria coronaria. Un stent ayuda a mantener abierta eleuterio arteria.  · Angioplastia coronaria. En dulce procedimiento se ensancha eleuterio arteria estrechada u obstruida.  · Cirugía de revascularización arterial coronaria. Esta permitirá que la robert pase la obstrucción (revascularización) para llegar al corazón.  INSTRUCCIONES PARA EL CUIDADO EN EL HOGAR   · Manuelito los medicamentos solamente juan se lo haya indicado el médico.  · No tome los siguientes medicamentos a menos que el médico lo autorice:  ¨ Antiinflamatorios no esteroides (CORAZON), juan ibuprofeno, naproxeno o celecoxib.  ¨ Suplementos vitamínicos que contienen vitamina A, vitamina E o ambas.  ¨ Tratamientos de reposición hormonal que contienen estrógeno con o sin progestina.  · Controle otros problemas de harjit, juan hipertensión y diabetes, juan se lo haya indicado christie médico.  · Siga eleuterio dieta cardiosaludable. El nutricionista puede darle instrucciones sobre opciones de alimentos saludables y los cambios correspondientes.  · Use métodos de cocción saludables, juan asar, grillar, hervir, hornear, cocer al vapor o saltear. Hable con un nutricionista para aprender más acerca de los métodos de cocción saludables.  · Siga un programa de actividad  física aprobado por el médico.  · Mantenga un peso saludable. Baje de peso según se lo indique el médico.  · Planifique períodos de descanso cuando se sienta fatigado.  · Aprenda a manejar el estrés.  · No consuma ningún producto que contenga tabaco, lo que incluye cigarrillos, tabaco de mascar o cigarrillos electrónicos. Si necesita ayuda para dejar de fumar, consulte al médico.  · Si kye alcohol y christie médico lo aprueba, limite la ingesta a no más de 1 medida por día. Eleuterio medida equivale a 12 onzas de cerveza, 5 onzas de vino o 1½ onzas de bebidas alcohólicas de kelly graduación.  · No consuma drogas.  · Concurra a todas las visitas de control juan se lo haya indicado el médico. [place] es importante.  SOLICITE ATENCIÓN MÉDICA DE INMEDIATO SI:   · Siente dolor en el pecho, el wicho, el brazo, la mandíbula, el estómago o la espalda que dure más de unos pocos minutos, el dolor es recurrente o no se maribel con nitroglicerina sublingual.  · Tiene sudoraciones intensas sin causa.  · Tiene estos síntomas sin causa aparente:  ¨ Acidez estomacal o empacho.  ¨ Falta de aire o dificultades respiratorias.  ¨ Náuseas o vómitos.  ¨ Fatiga.  ¨ Nerviosismo o ansiedad.  ¨ Debilidad.  ¨ Diarrea.  · Mareos o sensación de desvanecimiento repentinos.  · Se desmaya.  Estos síntomas pueden representar un problema grave que constituye eleuterio emergencia. No espere hasta que los síntomas desaparezcan. Solicite atención médica de inmediato. Comuníquese con el servicio de emergencias de christie localidad (911 en los [place]). No conduzca por laura propios medios hasta el hospital.     Esta información no tiene juan fin reemplazar el consejo del médico. Asegúrese de hacerle al médico cualquier pregunta que tenga.     Document Released: 12/18/2006 Document Revised: 01/08/2016  Elsevier Interactive Patient Education ©2016 [employer]      Depression / Suicide Risk    As you are discharged from this [facility] facility, it is important to learn  how to keep safe from harming yourself.    Recognize the warning signs:  · Abrupt changes in personality, positive or negative- including increase in energy   · Giving away possessions  · Change in eating patterns- significant weight changes-  positive or negative  · Change in sleeping patterns- unable to sleep or sleeping all the time   · Unwillingness or inability to communicate  · Depression  · Unusual sadness, discouragement and loneliness  · Talk of wanting to die  · Neglect of personal appearance   · Rebelliousness- reckless behavior  · Withdrawal from people/activities they love  · Confusion- inability to concentrate     If you or a loved one observes any of these behaviors or has concerns about self-harm, here's what you can do:  · Talk about it- your feelings and reasons for harming yourself  · Remove any means that you might use to hurt yourself (examples: pills, rope, extension cords, firearm)  · Get professional help from the community (Mental Health, Substance Abuse, psychological counseling)  · Do not be alone:Call your Safe Contact- someone whom you trust who will be there for you.  · Call your local CRISIS HOTLINE 112-5418 or 076-423-3833  · Call your local Children's Mobile Crisis Response Team [place] (786) 041-5935 or www.[employer]  · Call the toll free National Suicide Prevention Hotlines   · National Suicide Prevention Lifeline 734-600-CGED (1123)  · National Hope Line Network 800-SUICIDE (311-7969)

## 2017-08-18 NOTE — PROGRESS NOTES
"Assumed care of patient at 1900.  Patient is alert and oriented.  Rating \"pressure\" in mid chest at 7/10 and denies pain.  Patient encouraged to inform nurse or CNA of any worsening or change in chest pressure.  Tylenol offered but patient denies need.  IV remains saline locked.  Patient denies needs at this time.  Hourly rounding continues.  "

## 2017-08-18 NOTE — PROGRESS NOTES
Nursing care plan includes knowledge deficit, potential for discomfort, potential for compromised cardiac output.  POC includes teaching, comfort measures and reassurance, and access to code cart, cardiology stand by and availability of rapid response team.  Pt verbalizes good understanding of benefits and risks of pharmacological cardiac stress test.  Informed consent obtained.  Lexiscan given, pt developed the following signs/symptoms:stomach pressure/pain, crying HA, nausea.  Reversed with Aminophylline per protocol.  VS stable, symptoms resolved.  To waiting room, fluids and/or snack given, awaiting second scan.  Nursing goals met.

## 2017-08-18 NOTE — PROGRESS NOTES
8/18/17, 1255- Telemetry and IV discontinued as patient given discharge order for home. Patient echocardiogram noted as normal.

## 2017-08-18 NOTE — PROGRESS NOTES
Patient has been NPO since midnight.  Patient states she no longer has chest pressure and denies pain.

## 2017-09-17 DIAGNOSIS — E78.5 DYSLIPIDEMIA: ICD-10-CM

## 2017-09-17 DIAGNOSIS — E11.8 TYPE 2 DIABETES MELLITUS WITH COMPLICATION, WITHOUT LONG-TERM CURRENT USE OF INSULIN (HCC): ICD-10-CM

## 2017-09-18 RX ORDER — ALOGLIPTIN AND METFORMIN HYDROCHLORIDE 12.5; 1 MG/1; MG/1
TABLET, FILM COATED ORAL
Qty: 30 TAB | Refills: 2 | Status: SHIPPED | OUTPATIENT
Start: 2017-09-18 | End: 2017-10-24 | Stop reason: SDUPTHER

## 2017-09-18 RX ORDER — ATORVASTATIN CALCIUM 20 MG/1
TABLET, FILM COATED ORAL
Qty: 30 TAB | Refills: 6 | Status: SHIPPED | OUTPATIENT
Start: 2017-09-18 | End: 2017-10-24 | Stop reason: SDUPTHER

## 2017-09-18 NOTE — TELEPHONE ENCOUNTER
Last seen: 04/17/17 by Dr. Gomez  Next appt: None     Was the patient seen in the last year in this department? Yes   Does patient have an active prescription for medications requested? No   Received Request Via: Pharmacy

## 2017-10-24 ENCOUNTER — OFFICE VISIT (OUTPATIENT)
Dept: MEDICAL GROUP | Facility: MEDICAL CENTER | Age: 57
End: 2017-10-24
Attending: FAMILY MEDICINE
Payer: MEDICAID

## 2017-10-24 VITALS
BODY MASS INDEX: 29.83 KG/M2 | HEIGHT: 61 IN | DIASTOLIC BLOOD PRESSURE: 70 MMHG | OXYGEN SATURATION: 95 % | TEMPERATURE: 97.6 F | WEIGHT: 158 LBS | SYSTOLIC BLOOD PRESSURE: 110 MMHG | HEART RATE: 80 BPM | RESPIRATION RATE: 12 BRPM

## 2017-10-24 DIAGNOSIS — I10 ESSENTIAL HYPERTENSION: ICD-10-CM

## 2017-10-24 DIAGNOSIS — G47.00 INSOMNIA, UNSPECIFIED TYPE: ICD-10-CM

## 2017-10-24 DIAGNOSIS — F39 MOOD DISORDER (HCC): ICD-10-CM

## 2017-10-24 DIAGNOSIS — Z23 NEED FOR INFLUENZA VACCINATION: ICD-10-CM

## 2017-10-24 DIAGNOSIS — Z12.11 COLON CANCER SCREENING: ICD-10-CM

## 2017-10-24 DIAGNOSIS — R07.89 ATYPICAL CHEST PAIN: ICD-10-CM

## 2017-10-24 DIAGNOSIS — E78.5 DYSLIPIDEMIA: ICD-10-CM

## 2017-10-24 DIAGNOSIS — E11.8 TYPE 2 DIABETES MELLITUS WITH COMPLICATION, WITHOUT LONG-TERM CURRENT USE OF INSULIN (HCC): ICD-10-CM

## 2017-10-24 DIAGNOSIS — R13.12 OROPHARYNGEAL DYSPHAGIA: ICD-10-CM

## 2017-10-24 PROCEDURE — 90471 IMMUNIZATION ADMIN: CPT | Performed by: FAMILY MEDICINE

## 2017-10-24 PROCEDURE — 99214 OFFICE O/P EST MOD 30 MIN: CPT | Mod: 25 | Performed by: FAMILY MEDICINE

## 2017-10-24 PROCEDURE — 90686 IIV4 VACC NO PRSV 0.5 ML IM: CPT | Performed by: FAMILY MEDICINE

## 2017-10-24 PROCEDURE — 99204 OFFICE O/P NEW MOD 45 MIN: CPT | Performed by: FAMILY MEDICINE

## 2017-10-24 RX ORDER — LISINOPRIL 10 MG/1
10 TABLET ORAL DAILY
Qty: 30 TAB | Refills: 3 | Status: SHIPPED | OUTPATIENT
Start: 2017-10-24 | End: 2017-12-19 | Stop reason: SDUPTHER

## 2017-10-24 RX ORDER — OMEPRAZOLE 20 MG/1
20 CAPSULE, DELAYED RELEASE ORAL DAILY
COMMUNITY
End: 2017-11-13

## 2017-10-24 RX ORDER — ASPIRIN 81 MG/1
81 TABLET, CHEWABLE ORAL DAILY
Qty: 100 TAB | Refills: 3 | Status: SHIPPED | OUTPATIENT
Start: 2017-10-24 | End: 2017-12-19 | Stop reason: SDUPTHER

## 2017-10-24 RX ORDER — LISINOPRIL 10 MG/1
10 TABLET ORAL DAILY
COMMUNITY
End: 2017-10-24 | Stop reason: SDUPTHER

## 2017-10-24 RX ORDER — TRAZODONE HYDROCHLORIDE 50 MG/1
25-50 TABLET ORAL NIGHTLY PRN
Qty: 30 TAB | Refills: 3 | Status: SHIPPED | OUTPATIENT
Start: 2017-10-24 | End: 2017-11-13

## 2017-10-24 RX ORDER — ATORVASTATIN CALCIUM 20 MG/1
20 TABLET, FILM COATED ORAL
Qty: 30 TAB | Refills: 6 | Status: SHIPPED | OUTPATIENT
Start: 2017-10-24 | End: 2017-12-19 | Stop reason: SDUPTHER

## 2017-10-24 RX ORDER — ALOGLIPTIN AND METFORMIN HYDROCHLORIDE 12.5; 1 MG/1; MG/1
1 TABLET, FILM COATED ORAL DAILY
Qty: 30 TAB | Refills: 2 | Status: SHIPPED | OUTPATIENT
Start: 2017-10-24 | End: 2017-12-11 | Stop reason: SDUPTHER

## 2017-10-24 ASSESSMENT — ENCOUNTER SYMPTOMS
HEADACHES: 0
PALPITATIONS: 0
FEVER: 0
NAUSEA: 0
TREMORS: 0
ORTHOPNEA: 0
HALLUCINATIONS: 0
SENSORY CHANGE: 0
SPEECH CHANGE: 0
COUGH: 0
SHORTNESS OF BREATH: 0
NECK PAIN: 0
EYES NEGATIVE: 1
CHILLS: 0
SPUTUM PRODUCTION: 0
VOMITING: 0
TINGLING: 0
ABDOMINAL PAIN: 0
BACK PAIN: 0
DEPRESSION: 1
NERVOUS/ANXIOUS: 1

## 2017-10-24 ASSESSMENT — PAIN SCALES - GENERAL: PAINLEVEL: NO PAIN

## 2017-10-24 ASSESSMENT — LIFESTYLE VARIABLES: SUBSTANCE_ABUSE: 0

## 2017-10-24 NOTE — PROGRESS NOTES
Subjective:      Shahida Price is a 57 y.o. female who presents with Chest Pain (night only); Diabetes; and Medication Management            patient 57-year-old female here to establish with the clinic today. She recently came to [place] from [place]. She has multiple medical problems including diabetes, hypertension, hyperlipidemia, mood disorder, recurrent chest pains and oropharyngeal dysphagia following tumor removal in 1993.    She had initially established with a primary care physician in [place] prior to coming to this clinic and has been on several medications for her diabetes but no insulin. She will need her medications refilled today. Her last A1c was at 7.6. Will reorder an A1c as well as recheck a microalbumin creatinine ratio. She has been checking her feet on a regular basis and has not noticed any new skin breakdown or changes in sensation. She states that in February she had new glasses and at that time had an eye exam. We will continue to follow.    She is currently on Lipitor for her cholesterol. She is not having any darkening of urine or unusual muscle aches or pains. We'll have her continue to watch her diet as well as exercise as tolerated to help manage her cholesterol and blood sugars as well. We'll continue to follow.    Her blood pressure appears to be well managed with her current blood pressure medication. She is not having any shortness of breath or other neurologic changes. She does have periodic chest pains for which she has been worked up once in the emergency room and her tests were all negative. She continues to have these chest pains that occasionally radiate up to her jaw. Patient has been advised that these could still be signs of heart disease despite having negative test. A referral to cardiology has been made but patient has also been given instructions to return to the emergency room if she continues to have chest pain.    She also reports having periodic changes in her mood. She  states that she often becomes anxious, and occasionally during these episodes has chest pain. She feels that her anxiety as well as depression may be due to her multiple medical issues. She is not having any urges to harm herself or others, but she has been advised to go to the emergency room if she has any sudden urges to hurt herself or has any sudden worsening of her current symptoms. A referral to psychology has been made for patient today. She does not want to be on any medications at this point. She had been on alprazolam in the past which she feels did not work well for her.  She has been having episodes of insomnia, and would like to have a medication for this. Will have her try trazodone at 25-50 mg nightly as needed. Patient advised not to use her medications more than 3-4 times a week. Also discussed possible sleep study and good sleep hygiene if she continues to have insomnia.    She also reports having a tumor removed from her throat back in 1993. She states that recently she has been having increased difficulty swallowing. She states that she often has a dry mouth and has to drink fluids when she eats. She also reports having sinus issues and occasionally spits up phlegm. Will order a soft tissue neck ultrasound to further assess for any new lesions in her neck area. Also discussed a possible ear nose and throat referral if she continues to have the problem. We'll continue to follow.    Her previous surgical history includes the tumor removal from her neck in 1993.    Her family medical history is significant for diabetes and lung disease including lung cancer.    She is currently living with her family in [place].    She denies smoking tobacco, denies drinking alcohol and denies other substance use.        Review of Systems   Constitutional: Negative for chills and fever.   HENT: Negative for hearing loss.    Eyes: Negative.    Respiratory: Negative for cough, sputum production and shortness of breath.   "  Cardiovascular: Positive for chest pain. Negative for palpitations and orthopnea.   Gastrointestinal: Negative for abdominal pain, nausea and vomiting.   Genitourinary: Negative.    Musculoskeletal: Negative for back pain, joint pain and neck pain.   Skin: Negative for rash.   Neurological: Negative for tingling, tremors, sensory change, speech change and headaches.   Psychiatric/Behavioral: Positive for depression. Negative for hallucinations, substance abuse and suicidal ideas. The patient is nervous/anxious.           Objective:     /70   Pulse 80   Temp 36.4 °C (97.6 °F)   Resp 12   Ht 1.549 m (5' 1\")   Wt 71.7 kg (158 lb)   SpO2 95%   BMI 29.85 kg/m²      Physical Exam   Constitutional: She is oriented to person, place, and time. She appears well-developed and well-nourished.   HENT:   Head: Normocephalic and atraumatic.   Nose: Nose normal.   Mouth/Throat: Oropharynx is clear and moist.   +2 tonsils   Eyes: Conjunctivae and EOM are normal. Pupils are equal, round, and reactive to light.   Neck: Normal range of motion. Neck supple. No thyromegaly present.   Cardiovascular: Normal rate, regular rhythm and normal heart sounds.  Exam reveals no friction rub.    No murmur heard.  Pulmonary/Chest: Effort normal and breath sounds normal. No respiratory distress. She has no wheezes. She has no rales.   Abdominal: Soft. Bowel sounds are normal. She exhibits no distension. There is no tenderness.   Musculoskeletal: Normal range of motion.   Lymphadenopathy:     She has no cervical adenopathy.   Neurological: She is alert and oriented to person, place, and time. She has normal reflexes.   Skin: Skin is warm and dry.   Psychiatric: She has a normal mood and affect. Her behavior is normal.   Nursing note and vitals reviewed.              Assessment/Plan:     1. Dyslipidemia  We'll have her continue to take her medication as directed. She has been advised to increase the fibers in his diet, avoid fatty/fried " foods and try fish oil supplements if not allergic to seafood. Also advised to exercise as tolerated.     - omeprazole (PRILOSEC) 20 MG delayed-release capsule; Take 20 mg by mouth every day.  - INFLUENZA VACCINE QUAD INJ >3Y(PF)  - lisinopril (PRINIVIL) 10 MG Tab; Take 1 Tab by mouth every day.  Dispense: 30 Tab; Refill: 3  - atorvastatin (LIPITOR) 20 MG Tab; Take 1 Tab by mouth every day.  Dispense: 30 Tab; Refill: 6  - Empagliflozin (JARDIANCE) 10 MG Tab; Take 10 mg by mouth every day.  Dispense: 30 Tab; Refill: 3  - Alogliptin-Metformin HCl 12.5-1000 MG Tab; Take 1 Tab by mouth every day.  Dispense: 30 Tab; Refill: 2  - aspirin (ASA) 81 MG Chew Tab chewable tablet; Take 1 Tab by mouth every day.  Dispense: 100 Tab; Refill: 3  - REFERRAL TO CARDIOLOGY  - REFERRAL TO PSYCHOLOGY  - trazodone (DESYREL) 50 MG Tab; Take 0.5-1 Tabs by mouth at bedtime as needed for Sleep.  Dispense: 30 Tab; Refill: 3  - TSH WITH REFLEX TO FT4; Future  - COMP METABOLIC PANEL; Future  - LIPID PROFILE; Future  - CBC WITH DIFFERENTIAL; Future  - HEMOGLOBIN A1C; Future  - MICROALBUMIN CREAT RATIO URINE; Future  - VITAMIN D,25 HYDROXY; Future    2. Type 2 diabetes mellitus with complication, without long-term current use of insulin (HCC)  We'll have her continue to take her medication as directed. Refills have been sent to her pharmacy. We'll also reorder an A1c, microalbumin creatinine ratio and complete metabolic panel. We'll also have her continue to check her feet daily for any skin breakdown or changes in sensation.  - omeprazole (PRILOSEC) 20 MG delayed-release capsule; Take 20 mg by mouth every day.  - INFLUENZA VACCINE QUAD INJ >3Y(PF)  - lisinopril (PRINIVIL) 10 MG Tab; Take 1 Tab by mouth every day.  Dispense: 30 Tab; Refill: 3  - atorvastatin (LIPITOR) 20 MG Tab; Take 1 Tab by mouth every day.  Dispense: 30 Tab; Refill: 6  - Empagliflozin (JARDIANCE) 10 MG Tab; Take 10 mg by mouth every day.  Dispense: 30 Tab; Refill: 3  -  Alogliptin-Metformin HCl 12.5-1000 MG Tab; Take 1 Tab by mouth every day.  Dispense: 30 Tab; Refill: 2  - aspirin (ASA) 81 MG Chew Tab chewable tablet; Take 1 Tab by mouth every day.  Dispense: 100 Tab; Refill: 3  - REFERRAL TO CARDIOLOGY  - REFERRAL TO PSYCHOLOGY  - trazodone (DESYREL) 50 MG Tab; Take 0.5-1 Tabs by mouth at bedtime as needed for Sleep.  Dispense: 30 Tab; Refill: 3  - TSH WITH REFLEX TO FT4; Future  - COMP METABOLIC PANEL; Future  - LIPID PROFILE; Future  - CBC WITH DIFFERENTIAL; Future  - HEMOGLOBIN A1C; Future  - MICROALBUMIN CREAT RATIO URINE; Future  - VITAMIN D,25 HYDROXY; Future    3. Atypical chest pain  We'll have her referred to cardiology since she has a history of diabetes, hyperlipidemia, and hypertension. She has also been advised if she continues to have chest pain to proceed to the emergency room for further assistance in management.  - omeprazole (PRILOSEC) 20 MG delayed-release capsule; Take 20 mg by mouth every day.  - INFLUENZA VACCINE QUAD INJ >3Y(PF)  - lisinopril (PRINIVIL) 10 MG Tab; Take 1 Tab by mouth every day.  Dispense: 30 Tab; Refill: 3  - atorvastatin (LIPITOR) 20 MG Tab; Take 1 Tab by mouth every day.  Dispense: 30 Tab; Refill: 6  - Empagliflozin (JARDIANCE) 10 MG Tab; Take 10 mg by mouth every day.  Dispense: 30 Tab; Refill: 3  - Alogliptin-Metformin HCl 12.5-1000 MG Tab; Take 1 Tab by mouth every day.  Dispense: 30 Tab; Refill: 2  - aspirin (ASA) 81 MG Chew Tab chewable tablet; Take 1 Tab by mouth every day.  Dispense: 100 Tab; Refill: 3  - REFERRAL TO CARDIOLOGY  - REFERRAL TO PSYCHOLOGY  - trazodone (DESYREL) 50 MG Tab; Take 0.5-1 Tabs by mouth at bedtime as needed for Sleep.  Dispense: 30 Tab; Refill: 3  - TSH WITH REFLEX TO FT4; Future  - COMP METABOLIC PANEL; Future  - LIPID PROFILE; Future  - CBC WITH DIFFERENTIAL; Future  - HEMOGLOBIN A1C; Future  - MICROALBUMIN CREAT RATIO URINE; Future  - VITAMIN D,25 HYDROXY; Future    4. Need for influenza vaccination  She  would like to get a flu shot, she understands the risks and benefits of the flu shot. No recent fevers, no egg allergies, and no hx of GBS.    - omeprazole (PRILOSEC) 20 MG delayed-release capsule; Take 20 mg by mouth every day.  - INFLUENZA VACCINE QUAD INJ >3Y(PF)  - lisinopril (PRINIVIL) 10 MG Tab; Take 1 Tab by mouth every day.  Dispense: 30 Tab; Refill: 3  - atorvastatin (LIPITOR) 20 MG Tab; Take 1 Tab by mouth every day.  Dispense: 30 Tab; Refill: 6  - Empagliflozin (JARDIANCE) 10 MG Tab; Take 10 mg by mouth every day.  Dispense: 30 Tab; Refill: 3  - Alogliptin-Metformin HCl 12.5-1000 MG Tab; Take 1 Tab by mouth every day.  Dispense: 30 Tab; Refill: 2  - aspirin (ASA) 81 MG Chew Tab chewable tablet; Take 1 Tab by mouth every day.  Dispense: 100 Tab; Refill: 3  - REFERRAL TO CARDIOLOGY  - REFERRAL TO PSYCHOLOGY  - trazodone (DESYREL) 50 MG Tab; Take 0.5-1 Tabs by mouth at bedtime as needed for Sleep.  Dispense: 30 Tab; Refill: 3  - TSH WITH REFLEX TO FT4; Future  - COMP METABOLIC PANEL; Future  - LIPID PROFILE; Future  - CBC WITH DIFFERENTIAL; Future  - HEMOGLOBIN A1C; Future  - MICROALBUMIN CREAT RATIO URINE; Future  - VITAMIN D,25 HYDROXY; Future    5. Mood disorder (CMS-HCC)  Patient declines any medications today. Will refer to psychology for further assistance in managing her mood disorders including anxiety and depression. She is not having any SI or HI. Will continue to follow.  - omeprazole (PRILOSEC) 20 MG delayed-release capsule; Take 20 mg by mouth every day.  - INFLUENZA VACCINE QUAD INJ >3Y(PF)  - lisinopril (PRINIVIL) 10 MG Tab; Take 1 Tab by mouth every day.  Dispense: 30 Tab; Refill: 3  - atorvastatin (LIPITOR) 20 MG Tab; Take 1 Tab by mouth every day.  Dispense: 30 Tab; Refill: 6  - Empagliflozin (JARDIANCE) 10 MG Tab; Take 10 mg by mouth every day.  Dispense: 30 Tab; Refill: 3  - Alogliptin-Metformin HCl 12.5-1000 MG Tab; Take 1 Tab by mouth every day.  Dispense: 30 Tab; Refill: 2  - aspirin  (ASA) 81 MG Chew Tab chewable tablet; Take 1 Tab by mouth every day.  Dispense: 100 Tab; Refill: 3  - REFERRAL TO CARDIOLOGY  - REFERRAL TO PSYCHOLOGY  - trazodone (DESYREL) 50 MG Tab; Take 0.5-1 Tabs by mouth at bedtime as needed for Sleep.  Dispense: 30 Tab; Refill: 3  - TSH WITH REFLEX TO FT4; Future  - COMP METABOLIC PANEL; Future  - LIPID PROFILE; Future  - CBC WITH DIFFERENTIAL; Future  - HEMOGLOBIN A1C; Future  - MICROALBUMIN CREAT RATIO URINE; Future  - VITAMIN D,25 HYDROXY; Future    6. Essential hypertension  We'll have her continue to take her medication as directed. Refills have been sent to her pharmacy. She has been advised to monitor blood pressure at home and keep notes. If blood pressure elevated or having symptoms of CP, SOB or neurologic changes to go to the er.    - omeprazole (PRILOSEC) 20 MG delayed-release capsule; Take 20 mg by mouth every day.  - INFLUENZA VACCINE QUAD INJ >3Y(PF)  - lisinopril (PRINIVIL) 10 MG Tab; Take 1 Tab by mouth every day.  Dispense: 30 Tab; Refill: 3  - atorvastatin (LIPITOR) 20 MG Tab; Take 1 Tab by mouth every day.  Dispense: 30 Tab; Refill: 6  - Empagliflozin (JARDIANCE) 10 MG Tab; Take 10 mg by mouth every day.  Dispense: 30 Tab; Refill: 3  - Alogliptin-Metformin HCl 12.5-1000 MG Tab; Take 1 Tab by mouth every day.  Dispense: 30 Tab; Refill: 2  - aspirin (ASA) 81 MG Chew Tab chewable tablet; Take 1 Tab by mouth every day.  Dispense: 100 Tab; Refill: 3  - REFERRAL TO CARDIOLOGY  - REFERRAL TO PSYCHOLOGY  - trazodone (DESYREL) 50 MG Tab; Take 0.5-1 Tabs by mouth at bedtime as needed for Sleep.  Dispense: 30 Tab; Refill: 3  - TSH WITH REFLEX TO FT4; Future  - COMP METABOLIC PANEL; Future  - LIPID PROFILE; Future  - CBC WITH DIFFERENTIAL; Future  - HEMOGLOBIN A1C; Future  - MICROALBUMIN CREAT RATIO URINE; Future  - VITAMIN D,25 HYDROXY; Future    7. Insomnia, unspecified type  We'll have her try trazodone at 25-50 mg nightly as needed. Discussed good sleep hygiene as  well as discussed the referral to a sleep study. We'll continue to follow.  - omeprazole (PRILOSEC) 20 MG delayed-release capsule; Take 20 mg by mouth every day.  - INFLUENZA VACCINE QUAD INJ >3Y(PF)  - lisinopril (PRINIVIL) 10 MG Tab; Take 1 Tab by mouth every day.  Dispense: 30 Tab; Refill: 3  - atorvastatin (LIPITOR) 20 MG Tab; Take 1 Tab by mouth every day.  Dispense: 30 Tab; Refill: 6  - Empagliflozin (JARDIANCE) 10 MG Tab; Take 10 mg by mouth every day.  Dispense: 30 Tab; Refill: 3  - Alogliptin-Metformin HCl 12.5-1000 MG Tab; Take 1 Tab by mouth every day.  Dispense: 30 Tab; Refill: 2  - aspirin (ASA) 81 MG Chew Tab chewable tablet; Take 1 Tab by mouth every day.  Dispense: 100 Tab; Refill: 3  - REFERRAL TO CARDIOLOGY  - REFERRAL TO PSYCHOLOGY  - trazodone (DESYREL) 50 MG Tab; Take 0.5-1 Tabs by mouth at bedtime as needed for Sleep.  Dispense: 30 Tab; Refill: 3  - TSH WITH REFLEX TO FT4; Future  - COMP METABOLIC PANEL; Future  - LIPID PROFILE; Future  - CBC WITH DIFFERENTIAL; Future  - HEMOGLOBIN A1C; Future  - MICROALBUMIN CREAT RATIO URINE; Future  - VITAMIN D,25 HYDROXY; Future    8. Colon cancer screening  An FIT has been given to patient. Discussed needing a colonoscopy if test is positive.  - OCCULT BLOOD FECES IMMUNOASSAY; Future    9. Oropharyngeal dysphagia  We'll order a soft tissue neck ultrasound for further evaluation. She has a history of a soft tissue mass that had been removed from her throat in 1993. ER precautions have also been given to patient if she should have any sudden worsening of her current symptoms.  - US-SOFT TISSUES OF HEAD - NECK; Future  .

## 2017-10-31 ENCOUNTER — HOSPITAL ENCOUNTER (OUTPATIENT)
Dept: LAB | Facility: MEDICAL CENTER | Age: 57
End: 2017-10-31
Attending: FAMILY MEDICINE
Payer: MEDICAID

## 2017-10-31 DIAGNOSIS — E11.8 TYPE 2 DIABETES MELLITUS WITH COMPLICATION, WITHOUT LONG-TERM CURRENT USE OF INSULIN (HCC): ICD-10-CM

## 2017-10-31 DIAGNOSIS — I10 ESSENTIAL HYPERTENSION: ICD-10-CM

## 2017-10-31 DIAGNOSIS — R07.89 ATYPICAL CHEST PAIN: ICD-10-CM

## 2017-10-31 DIAGNOSIS — Z23 NEED FOR INFLUENZA VACCINATION: ICD-10-CM

## 2017-10-31 DIAGNOSIS — E78.5 DYSLIPIDEMIA: ICD-10-CM

## 2017-10-31 DIAGNOSIS — F39 MOOD DISORDER (HCC): ICD-10-CM

## 2017-10-31 DIAGNOSIS — G47.00 INSOMNIA, UNSPECIFIED TYPE: ICD-10-CM

## 2017-10-31 LAB
25(OH)D3 SERPL-MCNC: 12 NG/ML (ref 30–100)
ALBUMIN SERPL BCP-MCNC: 4.5 G/DL (ref 3.2–4.9)
ALBUMIN/GLOB SERPL: 1.4 G/DL
ALP SERPL-CCNC: 116 U/L (ref 30–99)
ALT SERPL-CCNC: 32 U/L (ref 2–50)
ANION GAP SERPL CALC-SCNC: 9 MMOL/L (ref 0–11.9)
AST SERPL-CCNC: 23 U/L (ref 12–45)
BASOPHILS # BLD AUTO: 0 % (ref 0–1.8)
BASOPHILS # BLD: 0 K/UL (ref 0–0.12)
BILIRUB SERPL-MCNC: 0.4 MG/DL (ref 0.1–1.5)
BUN SERPL-MCNC: 16 MG/DL (ref 8–22)
CALCIUM SERPL-MCNC: 9.8 MG/DL (ref 8.5–10.5)
CHLORIDE SERPL-SCNC: 104 MMOL/L (ref 96–112)
CHOLEST SERPL-MCNC: 117 MG/DL (ref 100–199)
CO2 SERPL-SCNC: 25 MMOL/L (ref 20–33)
CREAT SERPL-MCNC: 0.62 MG/DL (ref 0.5–1.4)
CREAT UR-MCNC: 75.1 MG/DL
EOSINOPHIL # BLD AUTO: 0.18 K/UL (ref 0–0.51)
EOSINOPHIL NFR BLD: 2.6 % (ref 0–6.9)
ERYTHROCYTE [DISTWIDTH] IN BLOOD BY AUTOMATED COUNT: 45.2 FL (ref 35.9–50)
EST. AVERAGE GLUCOSE BLD GHB EST-MCNC: 157 MG/DL
GFR SERPL CREATININE-BSD FRML MDRD: >60 ML/MIN/1.73 M 2
GLOBULIN SER CALC-MCNC: 3.3 G/DL (ref 1.9–3.5)
GLUCOSE SERPL-MCNC: 152 MG/DL (ref 65–99)
HBA1C MFR BLD: 7.1 % (ref 0–5.6)
HCT VFR BLD AUTO: 44.2 % (ref 37–47)
HDLC SERPL-MCNC: 35 MG/DL
HGB BLD-MCNC: 14.3 G/DL (ref 12–16)
LDLC SERPL CALC-MCNC: 51 MG/DL
LYMPHOCYTES # BLD AUTO: 3.08 K/UL (ref 1–4.8)
LYMPHOCYTES NFR BLD: 44.7 % (ref 22–41)
MANUAL DIFF BLD: NORMAL
MCH RBC QN AUTO: 28.4 PG (ref 27–33)
MCHC RBC AUTO-ENTMCNC: 32.4 G/DL (ref 33.6–35)
MCV RBC AUTO: 87.7 FL (ref 81.4–97.8)
MICROALBUMIN UR-MCNC: <0.7 MG/DL
MICROALBUMIN/CREAT UR: NORMAL MG/G (ref 0–30)
MONOCYTES # BLD AUTO: 0.55 K/UL (ref 0–0.85)
MONOCYTES NFR BLD AUTO: 7.9 % (ref 0–13.4)
MORPHOLOGY BLD-IMP: NORMAL
NEUTROPHILS # BLD AUTO: 3.09 K/UL (ref 2–7.15)
NEUTROPHILS NFR BLD: 44.8 % (ref 44–72)
NRBC # BLD AUTO: 0 K/UL
NRBC BLD AUTO-RTO: 0 /100 WBC
PLATELET # BLD AUTO: 240 K/UL (ref 164–446)
PLATELET BLD QL SMEAR: NORMAL
PMV BLD AUTO: 11.4 FL (ref 9–12.9)
POTASSIUM SERPL-SCNC: 4.5 MMOL/L (ref 3.6–5.5)
PROT SERPL-MCNC: 7.8 G/DL (ref 6–8.2)
RBC # BLD AUTO: 5.04 M/UL (ref 4.2–5.4)
RBC BLD AUTO: PRESENT
SODIUM SERPL-SCNC: 138 MMOL/L (ref 135–145)
T4 FREE SERPL-MCNC: 0.9 NG/DL (ref 0.53–1.43)
TRIGL SERPL-MCNC: 154 MG/DL (ref 0–149)
TSH SERPL DL<=0.005 MIU/L-ACNC: 3.8 UIU/ML (ref 0.3–3.7)
VARIANT LYMPHS BLD QL SMEAR: NORMAL
WBC # BLD AUTO: 6.9 K/UL (ref 4.8–10.8)

## 2017-10-31 PROCEDURE — 80053 COMPREHEN METABOLIC PANEL: CPT

## 2017-10-31 PROCEDURE — 84439 ASSAY OF FREE THYROXINE: CPT

## 2017-10-31 PROCEDURE — 36415 COLL VENOUS BLD VENIPUNCTURE: CPT

## 2017-10-31 PROCEDURE — 83036 HEMOGLOBIN GLYCOSYLATED A1C: CPT

## 2017-10-31 PROCEDURE — 84443 ASSAY THYROID STIM HORMONE: CPT

## 2017-10-31 PROCEDURE — 85007 BL SMEAR W/DIFF WBC COUNT: CPT

## 2017-10-31 PROCEDURE — 80061 LIPID PANEL: CPT

## 2017-10-31 PROCEDURE — 82570 ASSAY OF URINE CREATININE: CPT

## 2017-10-31 PROCEDURE — 82043 UR ALBUMIN QUANTITATIVE: CPT

## 2017-10-31 PROCEDURE — 85027 COMPLETE CBC AUTOMATED: CPT

## 2017-10-31 PROCEDURE — 82306 VITAMIN D 25 HYDROXY: CPT

## 2017-11-03 ENCOUNTER — HOSPITAL ENCOUNTER (OUTPATIENT)
Facility: MEDICAL CENTER | Age: 57
End: 2017-11-03
Attending: FAMILY MEDICINE
Payer: MEDICAID

## 2017-11-03 DIAGNOSIS — Z12.11 COLON CANCER SCREENING: ICD-10-CM

## 2017-11-03 PROCEDURE — 82274 ASSAY TEST FOR BLOOD FECAL: CPT

## 2017-11-04 LAB — HEMOCCULT STL QL IA: NEGATIVE

## 2017-11-10 ENCOUNTER — OFFICE VISIT (OUTPATIENT)
Dept: CARDIOLOGY | Facility: MEDICAL CENTER | Age: 57
End: 2017-11-10
Payer: MEDICAID

## 2017-11-10 VITALS
DIASTOLIC BLOOD PRESSURE: 80 MMHG | WEIGHT: 156 LBS | RESPIRATION RATE: 12 BRPM | HEIGHT: 61 IN | BODY MASS INDEX: 29.45 KG/M2 | SYSTOLIC BLOOD PRESSURE: 120 MMHG | OXYGEN SATURATION: 98 % | HEART RATE: 74 BPM

## 2017-11-10 DIAGNOSIS — R07.89 ATYPICAL CHEST PAIN: ICD-10-CM

## 2017-11-10 DIAGNOSIS — E78.5 DYSLIPIDEMIA: ICD-10-CM

## 2017-11-10 DIAGNOSIS — I10 ESSENTIAL HYPERTENSION: ICD-10-CM

## 2017-11-10 PROCEDURE — 99244 OFF/OP CNSLTJ NEW/EST MOD 40: CPT | Performed by: INTERNAL MEDICINE

## 2017-11-10 RX ORDER — NICOTINE POLACRILEX 4 MG/1
GUM, CHEWING ORAL
COMMUNITY
Start: 2017-09-25 | End: 2017-12-19 | Stop reason: SDUPTHER

## 2017-11-10 ASSESSMENT — ENCOUNTER SYMPTOMS
ABDOMINAL PAIN: 0
VOMITING: 0
BRUISES/BLEEDS EASILY: 0
EYE PAIN: 0
FEVER: 0
WHEEZING: 0
NERVOUS/ANXIOUS: 0
SPEECH CHANGE: 0
COUGH: 0
BLURRED VISION: 0
LOSS OF CONSCIOUSNESS: 0
NAUSEA: 0
DEPRESSION: 0
HEMOPTYSIS: 0
CHILLS: 0
EYE DISCHARGE: 0
MYALGIAS: 0
INSOMNIA: 1
PALPITATIONS: 0

## 2017-11-10 NOTE — PROGRESS NOTES
Subjective:   Shahida Price is a 57 y.o. female who presents today for evaluation of chest pain    She is seen in consultation at the request of Dr. Keon Talamantes for above issue  She has DM, HTN and dyslipidemia.    She reported occasional chest pain, mostly sharp in her mid or left chest.  The location is variable.They occur randomly, mostly seconds in duration.  She would also wake up in the middle of the night at time with chest pain.   She lately has been avoding late meal and that seems to help. Taking omeprazole daily.    Had stress MPI and echocardiography when she was admitted for chest pain this past August.  They both were unremarkable.    She also reported that she had negative stress MPI in [place] last year.    EKG from August by my review showed sinus rhythm, with borderline inferior Q    Past Medical History:   Diagnosis Date   • Allergy    • Atopic dermatitis    • Depression    • Diabetes (CMS-HCC)    • Gastritis    • Hyperlipidemia    • Hypertension      Past Surgical History:   Procedure Laterality Date   • CHOLECYSTECTOMY  2007   • OTHER ORTHOPEDIC SURGERY  2007    shoulder surgery     Family History   Problem Relation Age of Onset   • Cancer Mother      lung cancer, chronic smoker   • Lung Disease Mother    • Heart Attack Mother    • Diabetes Father      History   Smoking Status   • Former Smoker   • Packs/day: 1.00   • Years: 15.00   • Quit date: 4/17/2002   Smokeless Tobacco   • Never Used     Allergies   Allergen Reactions   • Shellfish Allergy Hives and Rash     Shellfish     Outpatient Encounter Prescriptions as of 11/10/2017   Medication Sig Dispense Refill   • ONE TOUCH ULTRA TEST strip CHECK BID DUE TO BRITTLE DIABETES WITH HYPOGLYCEMIC REACTIONS  5   • omeprazole (PRILOSEC) 20 MG delayed-release capsule Take 20 mg by mouth every day.     • lisinopril (PRINIVIL) 10 MG Tab Take 1 Tab by mouth every day. 30 Tab 3   • atorvastatin (LIPITOR) 20 MG Tab Take 1 Tab by mouth every day. 30 Tab 6   •  Empagliflozin (JARDIANCE) 10 MG Tab Take 10 mg by mouth every day. 30 Tab 3   • Alogliptin-Metformin HCl 12.5-1000 MG Tab Take 1 Tab by mouth every day. 30 Tab 2   • aspirin (ASA) 81 MG Chew Tab chewable tablet Take 1 Tab by mouth every day. 100 Tab 3   • Omega-3 Fatty Acids (FISH OIL) 1000 MG Cap capsule Take 1 Cap by mouth 3 times a day, with meals. 90 Cap 0   • metformin ER (GLUCOPHAGE XR) 500 MG TABLET SR 24 HR Take 500 mg by mouth 2 times a day. 30 Tab    • albuterol 108 (90 BASE) MCG/ACT Aero Soln inhalation aerosol Inhale 2 Puffs by mouth every 6 hours as needed for Shortness of Breath.     • metoprolol SR (TOPROL XL) 25 MG TABLET SR 24 HR Take 1 Tab by mouth every day. 30 Tab 3   • PRILOSEC 20 MG delayed-release capsule TK 1 C PO QD  0   • metformin (GLUCOPHAGE) 500 MG Tab      • omeprazole (PRILOSEC) 20 MG Tablet Delayed Response delayed-release tablet      • trazodone (DESYREL) 50 MG Tab Take 0.5-1 Tabs by mouth at bedtime as needed for Sleep. (Patient not taking: Reported on 11/10/2017) 30 Tab 3   • pantoprazole (PROTONIX) 40 MG Tablet Delayed Response Take 40 mg by mouth every day.       No facility-administered encounter medications on file as of 11/10/2017.      Review of Systems   Constitutional: Negative for chills and fever.   HENT: Negative for congestion.    Eyes: Negative for blurred vision, pain and discharge.   Respiratory: Negative for cough, hemoptysis and wheezing.    Cardiovascular: Positive for chest pain. Negative for palpitations.        Random, sometime at night waking her up from sleep   Gastrointestinal: Negative for abdominal pain, nausea and vomiting.   Musculoskeletal: Positive for joint pain. Negative for myalgias.   Skin: Negative for itching and rash.   Neurological: Negative for speech change and loss of consciousness.   Endo/Heme/Allergies: Does not bruise/bleed easily.   Psychiatric/Behavioral: Negative for depression. The patient has insomnia. The patient is not  "nervous/anxious.    All other systems reviewed and are negative.       Objective:   /80   Pulse 74   Resp 12   Ht 1.549 m (5' 1\")   Wt 70.8 kg (156 lb)   SpO2 98%   BMI 29.48 kg/m²     Physical Exam   Constitutional: She is oriented to person, place, and time. She appears well-developed. No distress.   HENT:   Mouth/Throat: Mucous membranes are normal.   Eyes: Conjunctivae and EOM are normal.   Neck: No JVD present. No tracheal deviation present. No thyroid mass and no thyromegaly present.   Cardiovascular: Normal rate, regular rhythm and intact distal pulses.    No murmur heard.  Pulmonary/Chest: Effort normal and breath sounds normal. No respiratory distress. She exhibits no tenderness.   Abdominal: Soft. There is no tenderness.   Musculoskeletal: Normal range of motion. She exhibits no edema.   Surgical scar right shoulder   Neurological: She is alert and oriented to person, place, and time. She has normal strength. She displays no tremor.   Skin: Skin is warm and dry. She is not diaphoretic.   Psychiatric: She has a normal mood and affect. Her behavior is normal.   Vitals reviewed.    Labs 10/31 LDL 51, HDL 35  CMP NL except glucose 152    Assessment:     1. Atypical chest pain     2. Essential hypertension     3. Dyslipidemia         Medical Decision Making:  Today's Assessment / Status / Plan:     Her recent MPI is negative. Her chest pain is rather atypical. Some of the symptoms suggest GERD.  I advised her to continue her current metoprolol and risk factor modifications.  She was advised to avoid late or spicy meal.  Will try to obtain record from previous cardiologist in [place].  Will see her back in 1 year or sooner as needed.  Please also do not hesitate to call for any questions.  Thank you kindly for allowing me to participate in the care of this patient.    "

## 2017-11-10 NOTE — LETTER
[facility]   [address], [address]  [place] [address]  Phone: [phone]  Fax: [phone]              Shahida Price  1960    Encounter Date: 11/10/2017    Patience Farley M.D.          CARDIOLOGY CONSULTATION NOTE:  Subjective:   Shahida Price is a 57 y.o. female who presents today     Past Medical History:   Diagnosis Date   • Allergy    • Atopic dermatitis    • Depression    • Diabetes (CMS-[facility])    • Gastritis    • Hyperlipidemia    • Hypertension      Past Surgical History:   Procedure Laterality Date   • CHOLECYSTECTOMY  2007   • OTHER ORTHOPEDIC SURGERY  2007    shoulder surgery     Family History   Problem Relation Age of Onset   • Cancer Mother      lung cancer, chronic smoker   • Lung Disease Mother    • Heart Attack Mother    • Diabetes Father      History   Smoking Status   • Former Smoker   • Packs/day: 1.00   • Years: 15.00   • Quit date: 4/17/2002   Smokeless Tobacco   • Never Used     Allergies   Allergen Reactions   • Shellfish Allergy Hives and Rash     Shellfish     Outpatient Encounter Prescriptions as of 11/10/2017   Medication Sig Dispense Refill   • ONE TOUCH ULTRA TEST strip CHECK BID DUE TO BRITTLE DIABETES WITH HYPOGLYCEMIC REACTIONS  5   • omeprazole (PRILOSEC) 20 MG delayed-release capsule Take 20 mg by mouth every day.     • lisinopril (PRINIVIL) 10 MG Tab Take 1 Tab by mouth every day. 30 Tab 3   • atorvastatin (LIPITOR) 20 MG Tab Take 1 Tab by mouth every day. 30 Tab 6   • Empagliflozin (JARDIANCE) 10 MG Tab Take 10 mg by mouth every day. 30 Tab 3   • Alogliptin-Metformin HCl 12.5-1000 MG Tab Take 1 Tab by mouth every day. 30 Tab 2   • aspirin (ASA) 81 MG Chew Tab chewable tablet Take 1 Tab by mouth every day. 100 Tab 3   • Omega-3 Fatty Acids (FISH OIL) 1000 MG Cap capsule Take 1 Cap by mouth 3 times a day, with meals. 90 Cap 0   • metformin ER (GLUCOPHAGE XR) 500 MG TABLET SR 24 HR Take 500 mg by mouth 2 times a day. 30 Tab    "  • albuterol 108 (90 BASE) MCG/ACT Aero Soln inhalation aerosol Inhale 2 Puffs by mouth every 6 hours as needed for Shortness of Breath.     • metoprolol SR (TOPROL XL) 25 MG TABLET SR 24 HR Take 1 Tab by mouth every day. 30 Tab 3   • PRILOSEC 20 MG delayed-release capsule TK 1 C PO QD  0   • metformin (GLUCOPHAGE) 500 MG Tab      • omeprazole (PRILOSEC) 20 MG Tablet Delayed Response delayed-release tablet      • trazodone (DESYREL) 50 MG Tab Take 0.5-1 Tabs by mouth at bedtime as needed for Sleep. (Patient not taking: Reported on 11/10/2017) 30 Tab 3   • pantoprazole (PROTONIX) 40 MG Tablet Delayed Response Take 40 mg by mouth every day.       No facility-administered encounter medications on file as of 11/10/2017.      Review of Systems   Constitutional: Negative for chills and fever.   HENT: Negative for congestion.    Eyes: Negative for blurred vision, pain and discharge.   Respiratory: Negative for cough, hemoptysis and wheezing.    Cardiovascular: Positive for chest pain. Negative for palpitations.        Random, sometime at night waking her up from sleep   Gastrointestinal: Negative for abdominal pain, nausea and vomiting.   Musculoskeletal: Positive for joint pain. Negative for myalgias.   Skin: Negative for itching and rash.   Neurological: Negative for speech change and loss of consciousness.   Endo/Heme/Allergies: Does not bruise/bleed easily.   Psychiatric/Behavioral: Negative for depression. The patient has insomnia. The patient is not nervous/anxious.    All other systems reviewed and are negative.       Objective:   /80   Pulse 74   Resp 12   Ht 1.549 m (5' 1\")   Wt 70.8 kg (156 lb)   SpO2 98%   BMI 29.48 kg/m²      Physical Exam   Constitutional: She is oriented to person, place, and time. She appears well-developed. No distress.   HENT:   Mouth/Throat: Mucous membranes are normal.   Eyes: Conjunctivae and EOM are normal.   Neck: No JVD present. No tracheal deviation present. No thyroid " mass and no thyromegaly present.   Cardiovascular: Normal rate, regular rhythm and intact distal pulses.    No murmur heard.  Pulmonary/Chest: Effort normal and breath sounds normal. No respiratory distress. She exhibits no tenderness.   Abdominal: Soft. There is no tenderness.   Musculoskeletal: Normal range of motion. She exhibits no edema.   Surgical scar right shoulder   Neurological: She is alert and oriented to person, place, and time. She has normal strength. She displays no tremor.   Skin: Skin is warm and dry. She is not diaphoretic.   Psychiatric: She has a normal mood and affect. Her behavior is normal.   Vitals reviewed.    Labs 10/31 LDL 51, HDL 35  CMP NL except glucose 152    Assessment:     1. Atypical chest pain     2. Essential hypertension     3. Dyslipidemia         Medical Decision Making:  Today's Assessment / Status / Plan:            No Recipients

## 2017-11-13 ENCOUNTER — OFFICE VISIT (OUTPATIENT)
Dept: MEDICAL GROUP | Facility: MEDICAL CENTER | Age: 57
End: 2017-11-13
Attending: FAMILY MEDICINE
Payer: MEDICAID

## 2017-11-13 VITALS
TEMPERATURE: 98.1 F | OXYGEN SATURATION: 94 % | RESPIRATION RATE: 16 BRPM | SYSTOLIC BLOOD PRESSURE: 115 MMHG | HEART RATE: 90 BPM | BODY MASS INDEX: 30.43 KG/M2 | WEIGHT: 155 LBS | HEIGHT: 60 IN | DIASTOLIC BLOOD PRESSURE: 62 MMHG

## 2017-11-13 DIAGNOSIS — E11.69 HYPERLIPIDEMIA ASSOCIATED WITH TYPE 2 DIABETES MELLITUS (HCC): ICD-10-CM

## 2017-11-13 DIAGNOSIS — J30.1 CHRONIC SEASONAL ALLERGIC RHINITIS DUE TO POLLEN: ICD-10-CM

## 2017-11-13 DIAGNOSIS — E78.5 HYPERLIPIDEMIA ASSOCIATED WITH TYPE 2 DIABETES MELLITUS (HCC): ICD-10-CM

## 2017-11-13 DIAGNOSIS — E55.9 VITAMIN D DEFICIENCY DISEASE: ICD-10-CM

## 2017-11-13 DIAGNOSIS — I10 ESSENTIAL HYPERTENSION: ICD-10-CM

## 2017-11-13 DIAGNOSIS — E11.8 DM (DIABETES MELLITUS) WITH COMPLICATIONS (HCC): ICD-10-CM

## 2017-11-13 DIAGNOSIS — E03.9 HYPOTHYROIDISM, UNSPECIFIED TYPE: ICD-10-CM

## 2017-11-13 PROCEDURE — 99214 OFFICE O/P EST MOD 30 MIN: CPT | Performed by: FAMILY MEDICINE

## 2017-11-13 PROCEDURE — 99213 OFFICE O/P EST LOW 20 MIN: CPT | Performed by: FAMILY MEDICINE

## 2017-11-13 RX ORDER — ERGOCALCIFEROL 1.25 MG/1
50000 CAPSULE ORAL
Qty: 3 CAP | Refills: 3 | Status: SHIPPED | OUTPATIENT
Start: 2017-11-13 | End: 2017-12-19 | Stop reason: SDUPTHER

## 2017-11-13 RX ORDER — CETIRIZINE HYDROCHLORIDE 10 MG/1
10 TABLET ORAL DAILY
Qty: 30 TAB | Refills: 3 | Status: SHIPPED | OUTPATIENT
Start: 2017-11-13 | End: 2018-02-28

## 2017-11-13 ASSESSMENT — PAIN SCALES - GENERAL: PAINLEVEL: NO PAIN

## 2017-11-13 ASSESSMENT — ENCOUNTER SYMPTOMS
FOCAL WEAKNESS: 0
SENSORY CHANGE: 0
HEADACHES: 0
ABDOMINAL PAIN: 0
SPUTUM PRODUCTION: 0
NAUSEA: 0
SPEECH CHANGE: 0
PSYCHIATRIC NEGATIVE: 1
EYES NEGATIVE: 1
SHORTNESS OF BREATH: 0
TINGLING: 0
VOMITING: 0
COUGH: 0
PALPITATIONS: 0
CHILLS: 0
FEVER: 0
TREMORS: 0

## 2017-11-14 NOTE — PROGRESS NOTES
Subjective:      Shahida Price is a 57 y.o. female who presents with Follow-Up (labs)            Patient here for follow-up of her recent blood work. She has a history of diabetes, hyperlipidemia, hypertension, seasonal allergies and a vitamin D deficiency.    The results of her recent CBC showed that her lymphocyte, was slightly elevated so we'll repeat a CBC to see if there is a trend of her lymphocyte count to be elevated. If it continues to increase will defer to hematology for further evaluation. We'll continue to follow.    Her most recent hemoglobin A1c was 7.1. She is currently on oral medications to manage her diabetes. We'll have her continue to check her blood sugars on a daily basis. She will need a new glucometer as well as test strips. She will also need to check her feet daily for any skin changes or changes in sensation. We'll continue to follow.    Her most recent lipid panel shows the following:    Cholesterol,Tot: 117  Triglycerides: 154 (H)  HDL: 35 (A)  LDL: 51    We'll have her continue to use her cholesterol medication as directed. She has not noticed any darkening of urine or unusual muscle aches or pains. Also advise her to avoid fatty and fried foods and also to increase the fiber intake in her diet. Also encouraged patient to exercise as tolerated at least 4-5 times weekly for 20-30 minutes at a time. We'll continue to follow.    Her TSH was slightly elevated at 3.8. We'll recheck her TSH to see if she needs to start therapy. She does not have any symptoms of hyper or hypothyroidism. We'll continue to follow.     Current medications, allergies, and problem list reviewed with patient and updated in EPIC.          Review of Systems   Constitutional: Negative for chills and fever.   HENT: Negative for hearing loss.    Eyes: Negative.    Respiratory: Negative for cough, sputum production and shortness of breath.    Cardiovascular: Negative for chest pain and palpitations.   Gastrointestinal:  Negative for abdominal pain, nausea and vomiting.   Neurological: Negative for tingling, tremors, sensory change, speech change, focal weakness and headaches.   Psychiatric/Behavioral: Negative.           Objective:     /62   Pulse 90   Temp 36.7 °C (98.1 °F)   Resp 16   Ht 1.524 m (5')   Wt 70.3 kg (155 lb)   SpO2 94%   BMI 30.27 kg/m²      Physical Exam   Constitutional: She is oriented to person, place, and time. She appears well-developed and well-nourished.   HENT:   Head: Normocephalic and atraumatic.   Cardiovascular: Normal rate, regular rhythm and normal heart sounds.  Exam reveals no friction rub.    No murmur heard.  Pulmonary/Chest: Effort normal and breath sounds normal. No respiratory distress. She has no wheezes. She has no rales.   Abdominal: Soft. Bowel sounds are normal. She exhibits no distension. There is no tenderness.   Neurological: She is alert and oriented to person, place, and time.   Skin: Skin is warm and dry.   Psychiatric: She has a normal mood and affect. Her behavior is normal.   Nursing note and vitals reviewed.              Assessment/Plan:     1. Vitamin D deficiency disease  We'll have her start vitamin D as directed. Reviewed the results of her recent vitamin D labs. We'll continue to follow.    2. DM (diabetes mellitus) with complications (CMS-[facility])  We'll have her continue to take her medication as previously directed. Will reorder a glucometer as well as lancets and strips. Her last A1c was 8.1 and her microalbumin creatinine ratio was within normal limits. We'll have her continue to check her feet for any skin breakdown or changes in sensation.    3. Hyperlipidemia associated with type 2 diabetes mellitus (CMS-HCC)  Left continue to take her medication as directed. Reviewed the results of her recent cholesterol panel. She has been advised to increase the fibers in his diet, avoid fatty/fried foods and try fish oil supplements if not allergic to seafood. Also  advised to exercise as tolerated.     - COMP METABOLIC PANEL; Future    4. Essential hypertension  We'll have her continues to take her medication as directed. She has been advised to monitor blood pressure at home and keep notes. If blood pressure elevated or having symptoms of CP, SOB or neurologic changes to go to the er.    - CBC WITH DIFFERENTIAL; Future    5. Chronic seasonal allergic rhinitis due to pollen  We'll have her start Zyrtec as directed. Will continue to follow.        6. Hypothyroidism, unspecified type  We will have her recheck her TSH and T4 level. We'll continue to follow.  - TSH WITH REFLEX TO FT4; Future

## 2017-11-16 ENCOUNTER — TELEPHONE (OUTPATIENT)
Dept: MEDICAL GROUP | Facility: MEDICAL CENTER | Age: 57
End: 2017-11-16

## 2017-11-16 NOTE — TELEPHONE ENCOUNTER
1. Caller Name: [facility]                                         Call Back Number: [phone]      Patient approves a detailed voicemail message: N\A    [facility] sent denial form for one touch rx's. Patient needs rx that is covered by [employer]. Please advise.

## 2017-11-22 ENCOUNTER — TELEPHONE (OUTPATIENT)
Dept: MEDICAL GROUP | Facility: MEDICAL CENTER | Age: 57
End: 2017-11-22

## 2017-12-10 ENCOUNTER — HOSPITAL ENCOUNTER (OUTPATIENT)
Dept: RADIOLOGY | Facility: MEDICAL CENTER | Age: 57
End: 2017-12-10
Attending: FAMILY MEDICINE
Payer: MEDICAID

## 2017-12-10 DIAGNOSIS — R13.12 OROPHARYNGEAL DYSPHAGIA: ICD-10-CM

## 2017-12-10 PROCEDURE — 76536 US EXAM OF HEAD AND NECK: CPT

## 2017-12-11 DIAGNOSIS — E78.5 DYSLIPIDEMIA: ICD-10-CM

## 2017-12-11 DIAGNOSIS — R07.89 ATYPICAL CHEST PAIN: ICD-10-CM

## 2017-12-11 DIAGNOSIS — E11.8 TYPE 2 DIABETES MELLITUS WITH COMPLICATION, WITHOUT LONG-TERM CURRENT USE OF INSULIN (HCC): ICD-10-CM

## 2017-12-11 DIAGNOSIS — I10 ESSENTIAL HYPERTENSION: ICD-10-CM

## 2017-12-11 DIAGNOSIS — Z23 NEED FOR INFLUENZA VACCINATION: ICD-10-CM

## 2017-12-11 DIAGNOSIS — G47.00 INSOMNIA, UNSPECIFIED TYPE: ICD-10-CM

## 2017-12-11 DIAGNOSIS — F39 MOOD DISORDER (HCC): ICD-10-CM

## 2017-12-11 RX ORDER — ALOGLIPTIN AND METFORMIN HYDROCHLORIDE 12.5; 1 MG/1; MG/1
1 TABLET, FILM COATED ORAL DAILY
Qty: 30 TAB | Refills: 2 | Status: SHIPPED | OUTPATIENT
Start: 2017-12-11 | End: 2017-12-19 | Stop reason: SDUPTHER

## 2017-12-11 NOTE — TELEPHONE ENCOUNTER
Was the patient seen in the last year in this department? Yes     Does patient have an active prescription for medications requested? Yes     Received Request Via: Pharmacy   Future Appointments       Provider Department [place]    12/19/2017 3:30 PM Keon Talamantes M.D. [facility]

## 2017-12-12 NOTE — TELEPHONE ENCOUNTER
[facility] called and wanted to inform us that they did not fill this Metformin medication because patient just filled a Metformin ER rx form 2/2017. Rx was written by a provider in [place], Dr Maxi Campoverde. Office # [phone]    Patient should not be on both rx's. Please advise.

## 2017-12-19 ENCOUNTER — OFFICE VISIT (OUTPATIENT)
Dept: MEDICAL GROUP | Facility: MEDICAL CENTER | Age: 57
End: 2017-12-19
Attending: FAMILY MEDICINE
Payer: MEDICAID

## 2017-12-19 VITALS
HEART RATE: 80 BPM | TEMPERATURE: 97.6 F | DIASTOLIC BLOOD PRESSURE: 70 MMHG | WEIGHT: 154 LBS | BODY MASS INDEX: 29.07 KG/M2 | OXYGEN SATURATION: 96 % | RESPIRATION RATE: 16 BRPM | HEIGHT: 61 IN | SYSTOLIC BLOOD PRESSURE: 115 MMHG

## 2017-12-19 DIAGNOSIS — E11.8 TYPE 2 DIABETES MELLITUS WITH COMPLICATION, WITHOUT LONG-TERM CURRENT USE OF INSULIN (HCC): ICD-10-CM

## 2017-12-19 DIAGNOSIS — I10 ESSENTIAL HYPERTENSION: ICD-10-CM

## 2017-12-19 DIAGNOSIS — K64.9 HEMORRHOIDS, UNSPECIFIED HEMORRHOID TYPE: ICD-10-CM

## 2017-12-19 DIAGNOSIS — E78.5 DYSLIPIDEMIA: ICD-10-CM

## 2017-12-19 PROCEDURE — 99213 OFFICE O/P EST LOW 20 MIN: CPT | Performed by: FAMILY MEDICINE

## 2017-12-19 PROCEDURE — 99214 OFFICE O/P EST MOD 30 MIN: CPT | Performed by: FAMILY MEDICINE

## 2017-12-19 RX ORDER — ALOGLIPTIN AND METFORMIN HYDROCHLORIDE 12.5; 1 MG/1; MG/1
1 TABLET, FILM COATED ORAL DAILY
Qty: 30 TAB | Refills: 2 | Status: SHIPPED | OUTPATIENT
Start: 2017-12-19 | End: 2018-02-28

## 2017-12-19 RX ORDER — ATORVASTATIN CALCIUM 20 MG/1
20 TABLET, FILM COATED ORAL
Qty: 30 TAB | Refills: 6 | Status: SHIPPED | OUTPATIENT
Start: 2017-12-19 | End: 2018-07-15 | Stop reason: SDUPTHER

## 2017-12-19 RX ORDER — ERGOCALCIFEROL 1.25 MG/1
50000 CAPSULE ORAL
Qty: 3 CAP | Refills: 3 | Status: SHIPPED | OUTPATIENT
Start: 2017-12-19 | End: 2018-12-05 | Stop reason: SDUPTHER

## 2017-12-19 RX ORDER — ASPIRIN 81 MG/1
81 TABLET, CHEWABLE ORAL DAILY
Qty: 100 TAB | Refills: 3 | Status: SHIPPED | OUTPATIENT
Start: 2017-12-19 | End: 2019-01-23 | Stop reason: SDUPTHER

## 2017-12-19 RX ORDER — LISINOPRIL 10 MG/1
10 TABLET ORAL DAILY
Qty: 30 TAB | Refills: 3 | Status: SHIPPED | OUTPATIENT
Start: 2017-12-19 | End: 2018-04-24 | Stop reason: SDUPTHER

## 2017-12-19 RX ORDER — HYDROCORTISONE ACETATE 25 MG/1
25 SUPPOSITORY RECTAL EVERY 12 HOURS
Qty: 10 SUPPOSITORY | Refills: 0 | Status: SHIPPED | OUTPATIENT
Start: 2017-12-19 | End: 2018-02-28

## 2017-12-19 RX ORDER — NICOTINE POLACRILEX 4 MG/1
20 GUM, CHEWING ORAL DAILY
Qty: 30 TAB | Refills: 6 | Status: SHIPPED | OUTPATIENT
Start: 2017-12-19 | End: 2020-03-02

## 2017-12-19 RX ORDER — LISINOPRIL 10 MG/1
10 TABLET ORAL DAILY
Qty: 30 TAB | Refills: 3 | Status: SHIPPED | OUTPATIENT
Start: 2017-12-19 | End: 2017-12-19

## 2017-12-19 ASSESSMENT — ENCOUNTER SYMPTOMS
PALPITATIONS: 0
SHORTNESS OF BREATH: 0
VOMITING: 0
COUGH: 0
CHILLS: 0
ABDOMINAL PAIN: 0
FEVER: 0
NAUSEA: 0
SPUTUM PRODUCTION: 0

## 2017-12-20 NOTE — PROGRESS NOTES
Subjective:      Shahida Price is a 57 y.o. female who presents with Medication Refill (true metrix)            Patient here for follow-up of her diabetes, hypertension, hemorrhoids, and hyperlipidemia.    She needs her medications refilled today. She states that she has been using her medications as previously prescribed by her doctor in [place], but her diabetic medications have not been filled by her pharmacy. So she will need to have her medications be ordered to be filled by her pharmacy. She also has not received her glucometer as well as test strips. So everything has been reordered for her. We'll have her continue to take her medications as previously directed. We'll continue to follow.  Her last hemoglobin A1c was at 7.1. We'll have her continue to check her feet daily for any skin changes or changes in sensation. She'll also schedule an appointment with her ophthalmologist for diabetic retinal exam.    She'll also continue to watch her diet avoiding fatty and fried foods continue to take her cholesterol medication as previously directed. She is not having any dark urine or unusual muscle aches or pains. We'll continue to follow.    Her blood pressure today appears to be well managed by her current blood pressure medications. We'll have her continue to take them as previously directed. She is not having any chest pain or shortness of breath. Continued follow-up with her specialists as directed. We'll continue to follow.    She has been having issues with hemorrhoids. She has tried hemorrhoidal creams over-the-counter has had no success. We'll have her try Anusol suppositories. If she has no success with this we'll refer to surgery for further assistance in management of her hemorrhoids. We'll continue to follow. ER precautions given if she has any sudden worsening or bleeding from her hemorrhoids.     Current medications, allergies, and problem list reviewed with patient and updated in [facility].          Review  "of Systems   Constitutional: Negative for chills and fever.   HENT: Negative for hearing loss and tinnitus.    Respiratory: Negative for cough, sputum production and shortness of breath.    Cardiovascular: Negative for chest pain and palpitations.   Gastrointestinal: Negative for abdominal pain, nausea and vomiting.          Objective:     /70   Pulse 80   Temp 36.4 °C (97.6 °F)   Resp 16   Ht 1.549 m (5' 1\")   Wt 69.9 kg (154 lb)   SpO2 96%   BMI 29.10 kg/m²      Physical Exam   Constitutional: She is oriented to person, place, and time. She appears well-developed and well-nourished.   HENT:   Head: Normocephalic and atraumatic.   Cardiovascular: Normal rate, regular rhythm and normal heart sounds.  Exam reveals no friction rub.    No murmur heard.  Pulmonary/Chest: Effort normal and breath sounds normal. No respiratory distress. She has no wheezes. She has no rales.   Abdominal: Soft. Bowel sounds are normal. She exhibits no distension. There is no tenderness.   Musculoskeletal: Normal range of motion.   Neurological: She is alert and oriented to person, place, and time.   Skin: Skin is warm and dry.   Psychiatric: She has a normal mood and affect. Her behavior is normal.   Nursing note and vitals reviewed.              Assessment/Plan:     1. Type 2 diabetes mellitus with complication, without long-term current use of insulin (HCC)  We will have her continue to use her medication as directed for her diabetes. Her last hemoglobin A1c was at 7.1. We'll have her also continue to check her blood sugars at home so a new glucometer will be ordered for her. We'll continue to follow.  - Alogliptin-Metformin HCl 12.5-1000 MG Tab; Take 1 Tab by mouth every day.  Dispense: 30 Tab; Refill: 2  - lisinopril (PRINIVIL) 10 MG Tab; Take 1 Tab by mouth every day.  Dispense: 30 Tab; Refill: 3  - atorvastatin (LIPITOR) 20 MG Tab; Take 1 Tab by mouth every day.  Dispense: 30 Tab; Refill: 6  - Empagliflozin (JARDIANCE) 10 " MG Tab; Take 10 mg by mouth every day.  Dispense: 30 Tab; Refill: 3  - aspirin (ASA) 81 MG Chew Tab chewable tablet; Take 1 Tab by mouth every day.  Dispense: 100 Tab; Refill: 3    2. Dyslipidemia  We'll have her continue to use her medication as directed. Her medications will be sent to her pharmacy for refill today. She has been advised to increase the fibers in his diet, avoid fatty/fried foods and try fish oil supplements if not allergic to seafood. Also advised to exercise as tolerated.     - lisinopril (PRINIVIL) 10 MG Tab; Take 1 Tab by mouth every day.  Dispense: 30 Tab; Refill: 3  - atorvastatin (LIPITOR) 20 MG Tab; Take 1 Tab by mouth every day.  Dispense: 30 Tab; Refill: 6  - Empagliflozin (JARDIANCE) 10 MG Tab; Take 10 mg by mouth every day.  Dispense: 30 Tab; Refill: 3  - aspirin (ASA) 81 MG Chew Tab chewable tablet; Take 1 Tab by mouth every day.  Dispense: 100 Tab; Refill: 3    3. Essential hypertension  We'll have her continue to use medication as directed. She has been advised to monitor blood pressure at home and keep notes. If blood pressure elevated or having symptoms of CP, SOB or neurologic changes to go to the er.    - lisinopril (PRINIVIL) 10 MG Tab; Take 1 Tab by mouth every day.  Dispense: 30 Tab; Refill: 3  - atorvastatin (LIPITOR) 20 MG Tab; Take 1 Tab by mouth every day.  Dispense: 30 Tab; Refill: 6  - Empagliflozin (JARDIANCE) 10 MG Tab; Take 10 mg by mouth every day.  Dispense: 30 Tab; Refill: 3  - aspirin (ASA) 81 MG Chew Tab chewable tablet; Take 1 Tab by mouth every day.  Dispense: 100 Tab; Refill: 3    4. Hemorrhoids, unspecified hemorrhoid type  We will have her try Anusol suppository. Also recommended fibers and fluids increase in her diet. Discussed surgery as an option if her symptoms do not improve with conservative management. We'll continue to follow.

## 2018-02-28 ENCOUNTER — HOSPITAL ENCOUNTER (EMERGENCY)
Facility: MEDICAL CENTER | Age: 58
End: 2018-02-28
Attending: EMERGENCY MEDICINE
Payer: MEDICAID

## 2018-02-28 VITALS
WEIGHT: 145.94 LBS | HEIGHT: 61 IN | SYSTOLIC BLOOD PRESSURE: 153 MMHG | DIASTOLIC BLOOD PRESSURE: 82 MMHG | OXYGEN SATURATION: 94 % | RESPIRATION RATE: 18 BRPM | TEMPERATURE: 97.6 F | BODY MASS INDEX: 27.55 KG/M2 | HEART RATE: 82 BPM

## 2018-02-28 DIAGNOSIS — R19.7 NAUSEA VOMITING AND DIARRHEA: ICD-10-CM

## 2018-02-28 DIAGNOSIS — R11.2 NAUSEA VOMITING AND DIARRHEA: ICD-10-CM

## 2018-02-28 LAB
ALBUMIN SERPL BCP-MCNC: 4.4 G/DL (ref 3.2–4.9)
ALBUMIN/GLOB SERPL: 1.3 G/DL
ALP SERPL-CCNC: 110 U/L (ref 30–99)
ALT SERPL-CCNC: 42 U/L (ref 2–50)
ANION GAP SERPL CALC-SCNC: 7 MMOL/L (ref 0–11.9)
APPEARANCE UR: CLEAR
AST SERPL-CCNC: 26 U/L (ref 12–45)
BASOPHILS # BLD AUTO: 0.3 % (ref 0–1.8)
BASOPHILS # BLD: 0.02 K/UL (ref 0–0.12)
BILIRUB SERPL-MCNC: 0.7 MG/DL (ref 0.1–1.5)
BILIRUB UR QL STRIP.AUTO: NEGATIVE
BUN SERPL-MCNC: 14 MG/DL (ref 8–22)
CALCIUM SERPL-MCNC: 8.9 MG/DL (ref 8.4–10.2)
CHLORIDE SERPL-SCNC: 103 MMOL/L (ref 96–112)
CO2 SERPL-SCNC: 22 MMOL/L (ref 20–33)
COLOR UR: YELLOW
CREAT SERPL-MCNC: 0.56 MG/DL (ref 0.5–1.4)
EOSINOPHIL # BLD AUTO: 0.05 K/UL (ref 0–0.51)
EOSINOPHIL NFR BLD: 0.6 % (ref 0–6.9)
ERYTHROCYTE [DISTWIDTH] IN BLOOD BY AUTOMATED COUNT: 45.3 FL (ref 35.9–50)
GLOBULIN SER CALC-MCNC: 3.4 G/DL (ref 1.9–3.5)
GLUCOSE SERPL-MCNC: 171 MG/DL (ref 65–99)
GLUCOSE UR STRIP.AUTO-MCNC: >=1000 MG/DL
HCT VFR BLD AUTO: 43 % (ref 37–47)
HGB BLD-MCNC: 14.5 G/DL (ref 12–16)
IMM GRANULOCYTES # BLD AUTO: 0.01 K/UL (ref 0–0.11)
IMM GRANULOCYTES NFR BLD AUTO: 0.1 % (ref 0–0.9)
KETONES UR STRIP.AUTO-MCNC: ABNORMAL MG/DL
LEUKOCYTE ESTERASE UR QL STRIP.AUTO: NEGATIVE
LIPASE SERPL-CCNC: <10 U/L (ref 7–58)
LYMPHOCYTES # BLD AUTO: 1.27 K/UL (ref 1–4.8)
LYMPHOCYTES NFR BLD: 16.3 % (ref 22–41)
MCH RBC QN AUTO: 28.7 PG (ref 27–33)
MCHC RBC AUTO-ENTMCNC: 33.7 G/DL (ref 33.6–35)
MCV RBC AUTO: 85 FL (ref 81.4–97.8)
MICRO URNS: ABNORMAL
MONOCYTES # BLD AUTO: 0.46 K/UL (ref 0–0.85)
MONOCYTES NFR BLD AUTO: 5.9 % (ref 0–13.4)
NEUTROPHILS # BLD AUTO: 5.96 K/UL (ref 2–7.15)
NEUTROPHILS NFR BLD: 76.8 % (ref 44–72)
NITRITE UR QL STRIP.AUTO: NEGATIVE
NRBC # BLD AUTO: 0 K/UL
NRBC BLD-RTO: 0 /100 WBC
PH UR STRIP.AUTO: 5.5 [PH]
PLATELET # BLD AUTO: 211 K/UL (ref 164–446)
PMV BLD AUTO: 10 FL (ref 9–12.9)
POTASSIUM SERPL-SCNC: 3.2 MMOL/L (ref 3.6–5.5)
PROT SERPL-MCNC: 7.8 G/DL (ref 6–8.2)
PROT UR QL STRIP: NEGATIVE MG/DL
RBC # BLD AUTO: 5.06 M/UL (ref 4.2–5.4)
RBC UR QL AUTO: NEGATIVE
SODIUM SERPL-SCNC: 132 MMOL/L (ref 135–145)
SP GR UR STRIP.AUTO: 1.02
WBC # BLD AUTO: 7.8 K/UL (ref 4.8–10.8)

## 2018-02-28 PROCEDURE — 36415 COLL VENOUS BLD VENIPUNCTURE: CPT

## 2018-02-28 PROCEDURE — 83690 ASSAY OF LIPASE: CPT

## 2018-02-28 PROCEDURE — 700105 HCHG RX REV CODE 258: Performed by: EMERGENCY MEDICINE

## 2018-02-28 PROCEDURE — 81003 URINALYSIS AUTO W/O SCOPE: CPT

## 2018-02-28 PROCEDURE — 96361 HYDRATE IV INFUSION ADD-ON: CPT

## 2018-02-28 PROCEDURE — 80053 COMPREHEN METABOLIC PANEL: CPT

## 2018-02-28 PROCEDURE — 96374 THER/PROPH/DIAG INJ IV PUSH: CPT

## 2018-02-28 PROCEDURE — 85025 COMPLETE CBC W/AUTO DIFF WBC: CPT

## 2018-02-28 PROCEDURE — 700111 HCHG RX REV CODE 636 W/ 250 OVERRIDE (IP): Performed by: EMERGENCY MEDICINE

## 2018-02-28 PROCEDURE — 99284 EMERGENCY DEPT VISIT MOD MDM: CPT

## 2018-02-28 RX ORDER — ONDANSETRON 4 MG/1
4 TABLET, ORALLY DISINTEGRATING ORAL EVERY 6 HOURS PRN
Qty: 20 TAB | Refills: 0 | Status: ON HOLD | OUTPATIENT
Start: 2018-02-28 | End: 2018-04-25

## 2018-02-28 RX ORDER — ONDANSETRON 2 MG/ML
4 INJECTION INTRAMUSCULAR; INTRAVENOUS ONCE
Status: COMPLETED | OUTPATIENT
Start: 2018-02-28 | End: 2018-02-28

## 2018-02-28 RX ORDER — SODIUM CHLORIDE, SODIUM LACTATE, POTASSIUM CHLORIDE, CALCIUM CHLORIDE 600; 310; 30; 20 MG/100ML; MG/100ML; MG/100ML; MG/100ML
1000 INJECTION, SOLUTION INTRAVENOUS ONCE
Status: COMPLETED | OUTPATIENT
Start: 2018-02-28 | End: 2018-02-28

## 2018-02-28 RX ADMIN — ONDANSETRON 4 MG: 2 INJECTION INTRAMUSCULAR; INTRAVENOUS at 17:31

## 2018-02-28 RX ADMIN — SODIUM CHLORIDE, POTASSIUM CHLORIDE, SODIUM LACTATE AND CALCIUM CHLORIDE 1000 ML: 600; 310; 30; 20 INJECTION, SOLUTION INTRAVENOUS at 17:31

## 2018-02-28 ASSESSMENT — PAIN SCALES - GENERAL
PAINLEVEL_OUTOF10: 0
PAINLEVEL_OUTOF10: 2

## 2018-03-01 NOTE — ED PROVIDER NOTES
ED Provider Note    ER PROVIDER NOTE        CHIEF COMPLAINT  Chief Complaint   Patient presents with   • Nausea   • Diarrhea   • Vomiting       HPI  Shahida Price is a 57 y.o. female who presents to the emergency department complaining of nausea vomiting and diarrhea. Patient reports that yesterday she began having some crampy upper abdominal pain, followed by nausea as well as 4 episodes of nonbloody diarrhea. Today she has had a few more episodes of diarrhea as well as nausea but no vomiting.  The pain seems to have improved although she is occasionally having some crampy pain in her upper abdomen with no radiation, no alleviating or exiting factors. She's had no fevers or chills. No recent travel. No recent antibiotic use.    Her son does have similar symptoms that started on Saturday and are almost gone at this time    REVIEW OF SYSTEMS  Pertinent positives include diarrhea. Pertinent negatives include no fever. See HPI for details. All other systems reviewed and are negative.    PAST MEDICAL HISTORY   has a past medical history of Allergy; Atopic dermatitis; Depression; Diabetes (CMS-[facility]); Gastritis; Hyperlipidemia; and Hypertension.    SURGICAL HISTORY   has a past surgical history that includes cholecystectomy (2007) and other orthopedic surgery (2007).    FAMILY HISTORY  Family History   Problem Relation Age of Onset   • Cancer Mother      lung cancer, chronic smoker   • Lung Disease Mother    • Heart Attack Mother    • Diabetes Father        SOCIAL HISTORY  Social History     Social History   • Marital status:      Spouse name: N/A   • Number of children: N/A   • Years of education: N/A     Social History Main Topics   • Smoking status: Former Smoker     Packs/day: 1.00     Years: 15.00     Quit date: 4/17/2002   • Smokeless tobacco: Never Used   • Alcohol use No      Comment: 1 beer / month   • Drug use: No   • Sexual activity: Yes     Partners: Male      Comment: menopause 2015     Other Topics  "Concern   • Not on file     Social History Narrative   • No narrative on file      History   Drug Use No       CURRENT MEDICATIONS  Home Medications     Reviewed by Stephen Driver (Pharmacy Tech) on 02/28/18 at 1712  Med List Status: Complete   Medication Last Dose Status   albuterol 108 (90 BASE) MCG/ACT Aero Soln inhalation aerosol 2/27/2018 Active   aspirin (ASA) 81 MG Chew Tab chewable tablet 2/27/2018 Active   atorvastatin (LIPITOR) 20 MG Tab 2/27/2018 Active   Empagliflozin (JARDIANCE) 10 MG Tab 2/27/2018 Active   lisinopril (PRINIVIL) 10 MG Tab 2/27/2018 Active   metformin (GLUCOPHAGE) 500 MG Tab 2/27/2018 Active   metoprolol SR (TOPROL XL) 25 MG TABLET SR 24 HR 2/27/2018 Active   Omega-3 Fatty Acids (FISH OIL) 1000 MG Cap capsule 2/27/2018 Active   omeprazole (PRILOSEC) 20 MG Tablet Delayed Response delayed-release tablet 2/27/2018 Active   vitamin D, Ergocalciferol, (DRISDOL) 90958 units Cap capsule 1/28/2018 Active                ALLERGIES  Allergies   Allergen Reactions   • Shellfish Allergy Hives and Rash     Shellfish       PHYSICAL EXAM  VITAL SIGNS: /82   Pulse 96   Temp 36.4 °C (97.6 °F)   Resp 18   Ht 1.549 m (5' 1\")   Wt 66.2 kg (145 lb 15.1 oz)   SpO2 97%   BMI 27.58 kg/m²   Pulse ox interpretation: I interpret this pulse ox as normal.    Constitutional: Alert in no apparent distress.  HENT: No signs of trauma, Bilateral external ears normal, Nose normal. Mucous membranes appear dry  Eyes: Pupils are equal and reactive, Conjunctiva normal, Non-icteric.   Neck: Normal range of motion, No tenderness, Supple, No stridor.   Lymphatic: No lymphadenopathy noted.   Cardiovascular: Tachycardic, no murmurs.   Thorax & Lungs: Normal breath sounds, No respiratory distress, No wheezing, No chest tenderness.   Abdomen: Bowel sounds normal, Soft, No tenderness, No masses, No pulsatile masses. No peritoneal signs.  Skin: Warm, Dry, No erythema, No rash.   Back: No bony tenderness, No CVA " tenderness.   Extremities: Intact distal pulses, No edema, No tenderness, No cyanosis, Negative Juarez's sign.  Musculoskeletal: Good range of motion in all major joints. No tenderness to palpation or major deformities noted.   Neurologic: Alert , Normal motor function, Normal sensory function, No focal deficits noted.   Psychiatric: Affect normal, Judgment normal, Mood normal.     DIAGNOSTIC STUDIES / PROCEDURES        LABS  Labs Reviewed   CBC WITH DIFFERENTIAL - Abnormal; Notable for the following:        Result Value    Neutrophils-Polys 76.80 (*)     Lymphocytes 16.30 (*)     All other components within normal limits   COMP METABOLIC PANEL - Abnormal; Notable for the following:     Sodium 132 (*)     Potassium 3.2 (*)     Glucose 171 (*)     Alkaline Phosphatase 110 (*)     All other components within normal limits   URINALYSIS - Abnormal; Notable for the following:     Glucose >=1000 (*)     Ketones Trace (*)     All other components within normal limits   LIPASE   ESTIMATED GFR       All labs reviewed by me.    RADIOLOGY  No orders to display     The radiologist's interpretation of all radiological studies have been reviewed by me.    COURSE & MEDICAL DECISION MAKING  Nursing notes, VS, PMSFHx reviewed in chart.    4:56 PM Patient seen and examined at bedside. Patient will be treated with Zofran and IV fluids as she does appear dehydrated with dry mucous membranes and tachycardia. Ordered for labs to evaluate her symptoms.     6:43 PM  Patient reevaluated, states she feels much better, no more nausea, abdomen is soft and nontender,    Decision Making:  This is a 57 y.o. female presenting with nausea and diarrhea.  I do feel this is likely consistent with viral infection although pt understands that no clear diagnosis is made. Patient is well appearing, tolerating by mouth well here, with appropriate vital signs, and appears well-hydrated after intervention. No abdominal tenderness, and no other focal  complaints. At this time given this exam and with overall well appearance, doubt surgical intra-abdominal process such as appendicitis or margot.  Given these factors I do feel is appropriate for continued outpatient management, strict return precautions and primary care follow-up and recheck. Family and pt understands and agree to the plan     The patient will return for new or worsening symptoms and is stable at the time of discharge.    The patient is referred to a primary physician for blood pressure management, diabetic screening, and for all other preventative health concerns.        DISPOSITION:  Patient will be discharged home in stable condition.    FOLLOW UP:  Keon Talamantes M.D.  [address] [place] [id number]  [phone]    In 5 days        OUTPATIENT MEDICATIONS:  New Prescriptions    ONDANSETRON (ZOFRAN ODT) 4 MG TABLET DISPERSIBLE    Take 1 Tab by mouth every 6 hours as needed for Nausea.         FINAL IMPRESSION  1. Nausea vomiting and diarrhea          The note accurately reflects work and decisions made by me.  Lorenzo Jett  2/28/2018  6:45 PM

## 2018-03-01 NOTE — ED NOTES
Discharge information provided. Pt verbalized understanding of discharge instructions to follow up with PCP and to return to ER if condition worsens. Pt expressed the awareness of not driving or operating heavy machinery, has ride home with Family. Pt ambulated out of ER in a steady gait, no additional questions or concerns. Paper scripts given, educated on new medication

## 2018-03-01 NOTE — DISCHARGE INSTRUCTIONS
Diarrea   (Diarrhea)  La diarrea es la materia fecal (heces) acuosas. Puede hacerlo sentir débil, cansado, sediento, o con la boca seca (signos de deshidratación). La materia fecal acuosa es signo de otro problema, generalmente eleuterio infección. Suele durar 2 o 3 grisel. Puede durar más tiempo si es el signo de eleuterio enfermedad grave. Cuídese según lo que le indique christie médico.   CUIDADOS EN EL HOGAR   · Cass 1 taza (8 onzas) de líquido cada vez que tenga eleuterio deposición acuosa.  · No cass los siguientes líquidos:  ¨ Los que contengan azúcares simples (fructosa, glucosa, galactosa, lactosa, sacarosa, maltosa).  ¨ Bebidas deportivas.  ¨ Jugos de fruta.  ¨ Productos lácteos enteros.  ¨ Gaseosas.  ¨ Bebidas con cafeína (café, té, gaseosas) o alcohol.  · Puede usar soluciones de rehidratación oral si christie médico lo autoriza. Usted mismo puede preparar la solución. Siga esta receta:  ¨  -  cucharadita de sal.  ¨ ¾ cucharadita de bicarbonato de soda.  ¨  de cucharadita de sal sustituta que contenga cloruro de potasio.  ¨ 1  cucharada de azúcar.  ¨ 1 litros (34 oz) de agua.  · Evite los siguientes alimentos:  ¨ Los que tienen gran contenido de fibra, juan frutas y verduras.  ¨ Frutas secas, semillas y panes y cereales integrales.  ¨  Las endulzadas con alcohol de azúcar (xylitol, sorbitol, manitol).  · Trate de comer los siguientes alimentos:  ¨ Los que tienen almidón, juan arroz, pan, pasta, cereales bajos en azúcar, venancio, sémola de maíz, alejandro al horno, galletas y panecillos.  ¨ Bananas.  ¨ Puré de manzana.  · Consuma alimentos ricos en probióticos, juan yogur y productos lácteos fermentados.  · Lávese samara las chetan cada vez que tenga eleuterio deposición acuosa.  · Sólo tome los medicamentos que le haya indicado christie médico.  · [place] un baño caliente para ayudar a disminuir el ardor o dolor que producen las deposiciones aguadas.  SOLICITE AYUDA DE INMEDIATO SI:   · No puede beber líquidos sin devolver vomitar.  · Sigue  vomitando.  · Tiene robert en la materia fecal, o es de color arabella y de aspecto alquitranado.  · No hay emisión de orina servando 6 a 8 horas o elimina eleuterio pequeña cantidad de orina muy oscura.  · Usted tiene dolor de estómago (abdominal) que empeora o se mantiene en el mismo lugar (localiza).  · Se siente débil, mareado, confundido o se desmaya.  · Siente un dolor de carl muy intenso.  · La materia fecal acuosa no mejora.  · Tiene fiebre o síntomas que persisten servando más de 2-3 grisel.  · Tiene fiebre y los síntomas empeoran de manera súbita.  ASEGÚRESE DE QUE:   · Comprende estas instrucciones.  · Controlará christie enfermedad.  · Solicitará ayuda de inmediato si no mejora o si empeora.     Esta información no tiene juan fin reemplazar el consejo del médico. Asegúrese de hacerle al médico cualquier pregunta que tenga.     Document Released: 12/04/2013 Document Revised: 01/08/2016  [employer] Interactive Patient Education ©2016 [employer]

## 2018-03-01 NOTE — ED NOTES
Pt assessed, c/o nausea and diarrhea since yesterday afternoon. Pt states her son had same s/s this weekend and is now better. Call light within reach, labs drawn and urine sent. Will cont to monitor.

## 2018-04-24 ENCOUNTER — HOSPITAL ENCOUNTER (OUTPATIENT)
Facility: MEDICAL CENTER | Age: 58
End: 2018-04-24
Payer: MEDICAID

## 2018-04-24 ENCOUNTER — HOSPITAL ENCOUNTER (EMERGENCY)
Facility: MEDICAL CENTER | Age: 58
End: 2018-04-24
Attending: EMERGENCY MEDICINE
Payer: MEDICAID

## 2018-04-24 ENCOUNTER — RESOLUTE PROFESSIONAL BILLING HOSPITAL PROF FEE (OUTPATIENT)
Dept: HOSPITALIST | Facility: MEDICAL CENTER | Age: 58
End: 2018-04-24
Payer: MEDICAID

## 2018-04-24 ENCOUNTER — OFFICE VISIT (OUTPATIENT)
Dept: MEDICAL GROUP | Facility: MEDICAL CENTER | Age: 58
End: 2018-04-24
Attending: FAMILY MEDICINE
Payer: MEDICAID

## 2018-04-24 ENCOUNTER — APPOINTMENT (OUTPATIENT)
Dept: RADIOLOGY | Facility: MEDICAL CENTER | Age: 58
End: 2018-04-24
Payer: MEDICAID

## 2018-04-24 VITALS
HEART RATE: 78 BPM | HEIGHT: 61 IN | RESPIRATION RATE: 14 BRPM | WEIGHT: 142 LBS | SYSTOLIC BLOOD PRESSURE: 120 MMHG | OXYGEN SATURATION: 97 % | BODY MASS INDEX: 26.81 KG/M2 | DIASTOLIC BLOOD PRESSURE: 78 MMHG | TEMPERATURE: 98.3 F

## 2018-04-24 VITALS
OXYGEN SATURATION: 95 % | HEIGHT: 61 IN | HEART RATE: 96 BPM | RESPIRATION RATE: 17 BRPM | SYSTOLIC BLOOD PRESSURE: 146 MMHG | TEMPERATURE: 98.5 F | DIASTOLIC BLOOD PRESSURE: 83 MMHG | WEIGHT: 142 LBS | BODY MASS INDEX: 26.81 KG/M2

## 2018-04-24 DIAGNOSIS — E11.8 DM (DIABETES MELLITUS) WITH COMPLICATIONS (HCC): ICD-10-CM

## 2018-04-24 DIAGNOSIS — R07.2 PRECORDIAL CHEST PAIN: ICD-10-CM

## 2018-04-24 DIAGNOSIS — E11.8 TYPE 2 DIABETES MELLITUS WITH COMPLICATION, WITHOUT LONG-TERM CURRENT USE OF INSULIN (HCC): ICD-10-CM

## 2018-04-24 DIAGNOSIS — E78.5 DYSLIPIDEMIA: ICD-10-CM

## 2018-04-24 DIAGNOSIS — I20.9 ANGINA PECTORIS (HCC): ICD-10-CM

## 2018-04-24 DIAGNOSIS — I10 ESSENTIAL HYPERTENSION: ICD-10-CM

## 2018-04-24 PROBLEM — R07.9 CHEST PAIN: Status: ACTIVE | Noted: 2017-08-17

## 2018-04-24 LAB
ALBUMIN SERPL BCP-MCNC: 4.7 G/DL (ref 3.2–4.9)
ALBUMIN/GLOB SERPL: 1.4 G/DL
ALP SERPL-CCNC: 109 U/L (ref 30–99)
ALT SERPL-CCNC: 36 U/L (ref 2–50)
ANION GAP SERPL CALC-SCNC: 7 MMOL/L (ref 0–11.9)
APTT PPP: 32.1 SEC (ref 24.7–36)
AST SERPL-CCNC: 27 U/L (ref 12–45)
BASOPHILS # BLD AUTO: 0.4 % (ref 0–1.8)
BASOPHILS # BLD: 0.03 K/UL (ref 0–0.12)
BILIRUB SERPL-MCNC: 0.5 MG/DL (ref 0.1–1.5)
BNP SERPL-MCNC: 9 PG/ML (ref 0–100)
BUN SERPL-MCNC: 19 MG/DL (ref 8–22)
CALCIUM SERPL-MCNC: 9.1 MG/DL (ref 8.4–10.2)
CHLORIDE SERPL-SCNC: 106 MMOL/L (ref 96–112)
CO2 SERPL-SCNC: 22 MMOL/L (ref 20–33)
CREAT SERPL-MCNC: 0.8 MG/DL (ref 0.5–1.4)
EOSINOPHIL # BLD AUTO: 0.24 K/UL (ref 0–0.51)
EOSINOPHIL NFR BLD: 2.9 % (ref 0–6.9)
ERYTHROCYTE [DISTWIDTH] IN BLOOD BY AUTOMATED COUNT: 45 FL (ref 35.9–50)
GLOBULIN SER CALC-MCNC: 3.4 G/DL (ref 1.9–3.5)
GLUCOSE SERPL-MCNC: 140 MG/DL (ref 65–99)
HBA1C MFR BLD: 6.6 % (ref ?–5.8)
HCT VFR BLD AUTO: 43.9 % (ref 37–47)
HGB BLD-MCNC: 14.6 G/DL (ref 12–16)
IMM GRANULOCYTES # BLD AUTO: 0.02 K/UL (ref 0–0.11)
IMM GRANULOCYTES NFR BLD AUTO: 0.2 % (ref 0–0.9)
INR PPP: 0.96 (ref 0.87–1.13)
INT CON NEG: NEGATIVE
INT CON POS: POSITIVE
LIPASE SERPL-CCNC: 36 U/L (ref 7–58)
LYMPHOCYTES # BLD AUTO: 3.05 K/UL (ref 1–4.8)
LYMPHOCYTES NFR BLD: 36.9 % (ref 22–41)
MCH RBC QN AUTO: 28.9 PG (ref 27–33)
MCHC RBC AUTO-ENTMCNC: 33.3 G/DL (ref 33.6–35)
MCV RBC AUTO: 86.9 FL (ref 81.4–97.8)
MONOCYTES # BLD AUTO: 0.52 K/UL (ref 0–0.85)
MONOCYTES NFR BLD AUTO: 6.3 % (ref 0–13.4)
NEUTROPHILS # BLD AUTO: 4.4 K/UL (ref 2–7.15)
NEUTROPHILS NFR BLD: 53.3 % (ref 44–72)
NRBC # BLD AUTO: 0 K/UL
NRBC BLD-RTO: 0 /100 WBC
PLATELET # BLD AUTO: 226 K/UL (ref 164–446)
PMV BLD AUTO: 10.5 FL (ref 9–12.9)
POTASSIUM SERPL-SCNC: 3.9 MMOL/L (ref 3.6–5.5)
PROT SERPL-MCNC: 8.1 G/DL (ref 6–8.2)
PROTHROMBIN TIME: 12.7 SEC (ref 12–14.6)
RBC # BLD AUTO: 5.05 M/UL (ref 4.2–5.4)
SODIUM SERPL-SCNC: 135 MMOL/L (ref 135–145)
TROPONIN I SERPL-MCNC: <0.02 NG/ML (ref 0–0.04)
WBC # BLD AUTO: 8.3 K/UL (ref 4.8–10.8)

## 2018-04-24 PROCEDURE — 85730 THROMBOPLASTIN TIME PARTIAL: CPT

## 2018-04-24 PROCEDURE — 85025 COMPLETE CBC W/AUTO DIFF WBC: CPT

## 2018-04-24 PROCEDURE — 99214 OFFICE O/P EST MOD 30 MIN: CPT | Performed by: FAMILY MEDICINE

## 2018-04-24 PROCEDURE — 99220 PR INITIAL OBSERVATION CARE,LEVL III: CPT | Performed by: INTERNAL MEDICINE

## 2018-04-24 PROCEDURE — 83690 ASSAY OF LIPASE: CPT

## 2018-04-24 PROCEDURE — 85610 PROTHROMBIN TIME: CPT

## 2018-04-24 PROCEDURE — 99213 OFFICE O/P EST LOW 20 MIN: CPT | Performed by: FAMILY MEDICINE

## 2018-04-24 PROCEDURE — 83036 HEMOGLOBIN GLYCOSYLATED A1C: CPT | Performed by: FAMILY MEDICINE

## 2018-04-24 PROCEDURE — 700102 HCHG RX REV CODE 250 W/ 637 OVERRIDE(OP): Performed by: EMERGENCY MEDICINE

## 2018-04-24 PROCEDURE — 99285 EMERGENCY DEPT VISIT HI MDM: CPT

## 2018-04-24 PROCEDURE — 80053 COMPREHEN METABOLIC PANEL: CPT

## 2018-04-24 PROCEDURE — 93005 ELECTROCARDIOGRAM TRACING: CPT | Performed by: FAMILY MEDICINE

## 2018-04-24 PROCEDURE — 83880 ASSAY OF NATRIURETIC PEPTIDE: CPT

## 2018-04-24 PROCEDURE — 71045 X-RAY EXAM CHEST 1 VIEW: CPT

## 2018-04-24 PROCEDURE — G0378 HOSPITAL OBSERVATION PER HR: HCPCS

## 2018-04-24 PROCEDURE — 36415 COLL VENOUS BLD VENIPUNCTURE: CPT

## 2018-04-24 PROCEDURE — A9270 NON-COVERED ITEM OR SERVICE: HCPCS | Performed by: EMERGENCY MEDICINE

## 2018-04-24 PROCEDURE — 84484 ASSAY OF TROPONIN QUANT: CPT

## 2018-04-24 RX ORDER — DEXTROSE MONOHYDRATE 25 G/50ML
25 INJECTION, SOLUTION INTRAVENOUS
Status: DISCONTINUED | OUTPATIENT
Start: 2018-04-24 | End: 2018-04-25 | Stop reason: HOSPADM

## 2018-04-24 RX ORDER — ASPIRIN 81 MG/1
81 TABLET, CHEWABLE ORAL DAILY
Status: DISCONTINUED | OUTPATIENT
Start: 2018-04-25 | End: 2018-04-25 | Stop reason: HOSPADM

## 2018-04-24 RX ORDER — PROMETHAZINE HYDROCHLORIDE 25 MG/1
12.5-25 SUPPOSITORY RECTAL EVERY 4 HOURS PRN
Status: CANCELLED | OUTPATIENT
Start: 2018-04-24

## 2018-04-24 RX ORDER — SODIUM CHLORIDE 9 MG/ML
INJECTION, SOLUTION INTRAVENOUS CONTINUOUS
Status: CANCELLED | OUTPATIENT
Start: 2018-04-24

## 2018-04-24 RX ORDER — LISINOPRIL 5 MG/1
10 TABLET ORAL DAILY
Status: CANCELLED | OUTPATIENT
Start: 2018-04-25

## 2018-04-24 RX ORDER — PROMETHAZINE HYDROCHLORIDE 25 MG/1
12.5-25 SUPPOSITORY RECTAL EVERY 4 HOURS PRN
Status: DISCONTINUED | OUTPATIENT
Start: 2018-04-24 | End: 2018-04-25 | Stop reason: HOSPADM

## 2018-04-24 RX ORDER — ONDANSETRON 4 MG/1
4 TABLET, ORALLY DISINTEGRATING ORAL EVERY 4 HOURS PRN
Status: CANCELLED | OUTPATIENT
Start: 2018-04-24

## 2018-04-24 RX ORDER — LISINOPRIL 10 MG/1
10 TABLET ORAL DAILY
Qty: 30 TAB | Refills: 3 | Status: SHIPPED | OUTPATIENT
Start: 2018-04-24 | End: 2018-08-13 | Stop reason: SDUPTHER

## 2018-04-24 RX ORDER — BISACODYL 10 MG
10 SUPPOSITORY, RECTAL RECTAL
Status: CANCELLED | OUTPATIENT
Start: 2018-04-24

## 2018-04-24 RX ORDER — PROMETHAZINE HYDROCHLORIDE 25 MG/1
12.5-25 TABLET ORAL EVERY 4 HOURS PRN
Status: CANCELLED | OUTPATIENT
Start: 2018-04-24

## 2018-04-24 RX ORDER — DEXTROSE MONOHYDRATE 25 G/50ML
25 INJECTION, SOLUTION INTRAVENOUS
Status: CANCELLED | OUTPATIENT
Start: 2018-04-24

## 2018-04-24 RX ORDER — HEPARIN SODIUM 5000 [USP'U]/ML
5000 INJECTION, SOLUTION INTRAVENOUS; SUBCUTANEOUS EVERY 8 HOURS
Status: DISCONTINUED | OUTPATIENT
Start: 2018-04-24 | End: 2018-04-25 | Stop reason: HOSPADM

## 2018-04-24 RX ORDER — AMOXICILLIN 250 MG
2 CAPSULE ORAL 2 TIMES DAILY
Status: CANCELLED | OUTPATIENT
Start: 2018-04-24

## 2018-04-24 RX ORDER — POLYETHYLENE GLYCOL 3350 17 G/17G
1 POWDER, FOR SOLUTION ORAL
Status: DISCONTINUED | OUTPATIENT
Start: 2018-04-24 | End: 2018-04-25 | Stop reason: HOSPADM

## 2018-04-24 RX ORDER — ATORVASTATIN CALCIUM 40 MG/1
20 TABLET, FILM COATED ORAL
Status: CANCELLED | OUTPATIENT
Start: 2018-04-24

## 2018-04-24 RX ORDER — PROMETHAZINE HYDROCHLORIDE 25 MG/1
12.5-25 TABLET ORAL EVERY 4 HOURS PRN
Status: DISCONTINUED | OUTPATIENT
Start: 2018-04-24 | End: 2018-04-25 | Stop reason: HOSPADM

## 2018-04-24 RX ORDER — ATORVASTATIN CALCIUM 40 MG/1
20 TABLET, FILM COATED ORAL
Status: DISCONTINUED | OUTPATIENT
Start: 2018-04-24 | End: 2018-04-25 | Stop reason: HOSPADM

## 2018-04-24 RX ORDER — HEPARIN SODIUM 5000 [USP'U]/ML
5000 INJECTION, SOLUTION INTRAVENOUS; SUBCUTANEOUS EVERY 8 HOURS
Status: CANCELLED | OUTPATIENT
Start: 2018-04-24

## 2018-04-24 RX ORDER — ONDANSETRON 2 MG/ML
4 INJECTION INTRAMUSCULAR; INTRAVENOUS EVERY 4 HOURS PRN
Status: CANCELLED | OUTPATIENT
Start: 2018-04-24

## 2018-04-24 RX ORDER — METOPROLOL SUCCINATE 25 MG/1
25 TABLET, EXTENDED RELEASE ORAL DAILY
Status: DISCONTINUED | OUTPATIENT
Start: 2018-04-25 | End: 2018-04-25 | Stop reason: HOSPADM

## 2018-04-24 RX ORDER — METOPROLOL SUCCINATE 25 MG/1
25 TABLET, EXTENDED RELEASE ORAL DAILY
Status: CANCELLED | OUTPATIENT
Start: 2018-04-25

## 2018-04-24 RX ORDER — NICOTINE POLACRILEX 4 MG/1
20 GUM, CHEWING ORAL DAILY
Status: CANCELLED | OUTPATIENT
Start: 2018-04-25

## 2018-04-24 RX ORDER — ACETAMINOPHEN 325 MG/1
650 TABLET ORAL EVERY 6 HOURS PRN
Status: DISCONTINUED | OUTPATIENT
Start: 2018-04-24 | End: 2018-04-25 | Stop reason: HOSPADM

## 2018-04-24 RX ORDER — BETAMETHASONE DIPROPIONATE 0.5 MG/G
1 CREAM TOPICAL 2 TIMES DAILY
COMMUNITY
End: 2018-06-20 | Stop reason: SDUPTHER

## 2018-04-24 RX ORDER — BISACODYL 10 MG
10 SUPPOSITORY, RECTAL RECTAL
Status: DISCONTINUED | OUTPATIENT
Start: 2018-04-24 | End: 2018-04-25 | Stop reason: HOSPADM

## 2018-04-24 RX ORDER — ONDANSETRON 4 MG/1
4 TABLET, ORALLY DISINTEGRATING ORAL EVERY 4 HOURS PRN
Status: DISCONTINUED | OUTPATIENT
Start: 2018-04-24 | End: 2018-04-25 | Stop reason: HOSPADM

## 2018-04-24 RX ORDER — ALBUTEROL SULFATE 90 UG/1
2 AEROSOL, METERED RESPIRATORY (INHALATION) EVERY 6 HOURS PRN
Status: CANCELLED | OUTPATIENT
Start: 2018-04-24

## 2018-04-24 RX ORDER — SODIUM CHLORIDE 9 MG/ML
INJECTION, SOLUTION INTRAVENOUS CONTINUOUS
Status: DISCONTINUED | OUTPATIENT
Start: 2018-04-24 | End: 2018-04-25 | Stop reason: HOSPADM

## 2018-04-24 RX ORDER — ALBUTEROL SULFATE 90 UG/1
2 AEROSOL, METERED RESPIRATORY (INHALATION) EVERY 6 HOURS PRN
Status: DISCONTINUED | OUTPATIENT
Start: 2018-04-24 | End: 2018-04-25 | Stop reason: HOSPADM

## 2018-04-24 RX ORDER — EMPAGLIFLOZIN 10 MG/1
10 TABLET, FILM COATED ORAL DAILY
Qty: 30 TAB | Refills: 3 | Status: SHIPPED | OUTPATIENT
Start: 2018-04-24 | End: 2018-08-13 | Stop reason: SDUPTHER

## 2018-04-24 RX ORDER — ONDANSETRON 2 MG/ML
4 INJECTION INTRAMUSCULAR; INTRAVENOUS EVERY 4 HOURS PRN
Status: DISCONTINUED | OUTPATIENT
Start: 2018-04-24 | End: 2018-04-25 | Stop reason: HOSPADM

## 2018-04-24 RX ORDER — LISINOPRIL 5 MG/1
10 TABLET ORAL DAILY
Status: DISCONTINUED | OUTPATIENT
Start: 2018-04-25 | End: 2018-04-25 | Stop reason: HOSPADM

## 2018-04-24 RX ORDER — ACETAMINOPHEN 325 MG/1
650 TABLET ORAL EVERY 6 HOURS PRN
Status: CANCELLED | OUTPATIENT
Start: 2018-04-24

## 2018-04-24 RX ORDER — POLYETHYLENE GLYCOL 3350 17 G/17G
1 POWDER, FOR SOLUTION ORAL
Status: CANCELLED | OUTPATIENT
Start: 2018-04-24

## 2018-04-24 RX ORDER — AMOXICILLIN 250 MG
2 CAPSULE ORAL 2 TIMES DAILY
Status: DISCONTINUED | OUTPATIENT
Start: 2018-04-24 | End: 2018-04-25 | Stop reason: HOSPADM

## 2018-04-24 RX ORDER — ASPIRIN 81 MG/1
81 TABLET, CHEWABLE ORAL DAILY
Status: CANCELLED | OUTPATIENT
Start: 2018-04-25

## 2018-04-24 RX ORDER — NICOTINE POLACRILEX 4 MG/1
20 GUM, CHEWING ORAL DAILY
Status: DISCONTINUED | OUTPATIENT
Start: 2018-04-25 | End: 2018-04-25 | Stop reason: HOSPADM

## 2018-04-24 RX ORDER — NITROGLYCERIN 0.4 MG/1
0.4 TABLET SUBLINGUAL PRN
Qty: 25 TAB | Refills: 3 | Status: SHIPPED | OUTPATIENT
Start: 2018-04-24 | End: 2018-04-24

## 2018-04-24 RX ORDER — NITROGLYCERIN 0.4 MG/1
0.4 TABLET SUBLINGUAL PRN
Qty: 25 TAB | Refills: 3 | Status: SHIPPED
Start: 2018-04-24 | End: 2018-04-24

## 2018-04-24 RX ORDER — ASPIRIN 81 MG/1
324 TABLET, CHEWABLE ORAL ONCE
Status: COMPLETED | OUTPATIENT
Start: 2018-04-24 | End: 2018-04-24

## 2018-04-24 RX ADMIN — ASPIRIN 81 MG 324 MG: 81 TABLET ORAL at 20:32

## 2018-04-24 ASSESSMENT — ENCOUNTER SYMPTOMS
PND: 0
IRREGULAR HEARTBEAT: 0
BACK PAIN: 0
DIZZINESS: 0
SPUTUM PRODUCTION: 0
PALPITATIONS: 0
LOWER EXTREMITY EDEMA: 0
SHORTNESS OF BREATH: 0
NERVOUS/ANXIOUS: 0
ORTHOPNEA: 0
CHILLS: 0
SYNCOPE: 0
HEMOPTYSIS: 0
HEADACHES: 0
EYE REDNESS: 0
WEAKNESS: 0
HEARTBURN: 0
FEVER: 0
NEAR-SYNCOPE: 0
MYALGIAS: 0
NECK PAIN: 0
INSOMNIA: 0
BACK PAIN: 0
SPUTUM PRODUCTION: 0
SHORTNESS OF BREATH: 0
EYE DISCHARGE: 0
DEPRESSION: 0
FEVER: 0
DIZZINESS: 0
DIARRHEA: 0
CLAUDICATION: 0
ORTHOPNEA: 0
NAUSEA: 0
ABDOMINAL PAIN: 0
BLURRED VISION: 0
EXERTIONAL CHEST PRESSURE: 0
EYE PAIN: 0
WEIGHT LOSS: 0
STRIDOR: 0
COUGH: 1
ABDOMINAL PAIN: 0
NAUSEA: 0
PALPITATIONS: 0
NUMBNESS: 0
VOMITING: 0
FOCAL WEAKNESS: 0
DIAPHORESIS: 0
LEG PAIN: 0
COUGH: 0
VOMITING: 0
SEIZURES: 0
HEADACHES: 0

## 2018-04-24 ASSESSMENT — PAIN SCALES - GENERAL: PAINLEVEL_OUTOF10: 3

## 2018-04-24 NOTE — PROGRESS NOTES
Subjective:      Shahida Price is a 57 y.o. female who presents with Follow-Up and Diabetes            Patient is here today for a reevaluation of her history of chest pain, diabetes, hyperlipidemia and hypertension.    Normal left ventricular systolic function.  Left ventricular ejection fraction is visually estimated to be 70%.  Grade I diastolic dysfunction.  The right ventricle was normal in size and function.  No significant valve disease or flow abnormalities.     Will have her continue to use her medications as directed but will refer to cardiology for a further evaluation of her angina. She has been advised to go to the er if she has any recurrance of her sxs.     Will have her continue to use her medications as directed for her diabetes and hyperlipidemia. She is not having any darkening of her urine or unusual muscle aches or pains. We'll have her continue to watch her diet avoiding fatty and fried foods as well as exercising as tolerated. If she continues to have chest pain while exercising she has been advised to go to the emergency room for a further evaluation.  Her last hemoglobin A1c was at 7.1 we'll check a POC hemoglobin A1c today.  An EKG will also be done to further assess her current heart rhythm. Her EKG had non specific elevations in he inferior leads and poor R wave progression in her anteroseptal leads. Recommended pt go to ER for a further evaluation, waiver signed by pt since she declined [facility]. We'll continue to follow.          Chest Pain    This is a new problem. The current episode started in the past 7 days (on sat, while walking). Onset quality: lasted few minutes while walking  Episode frequency: occured only once, not happened since. The pain is present in the substernal region. The quality of the pain is described as dull and tightness. The pain radiates to the left jaw and left arm. Associated symptoms include a cough. Pertinent negatives include no abdominal pain, back pain,  "claudication, diaphoresis, dizziness, exertional chest pressure, fever, headaches, hemoptysis, irregular heartbeat, leg pain, lower extremity edema, malaise/fatigue, nausea, near-syncope, numbness, orthopnea, palpitations, PND, shortness of breath, sputum production, syncope, vomiting or weakness. Associated symptoms comments: Used inhaler and tightness improved. The cough has no precipitants. The cough is non-productive. The cough is relieved by rest and a beta-agonist. The cough is worsened by activity. The pain is aggravated by walking. She has tried nothing for the symptoms. Risk factors include post-menopausal.   Her past medical history is significant for diabetes, hyperlipidemia and hypertension.   Her family medical history is significant for CAD.       Review of Systems   Constitutional: Negative for diaphoresis, fever and malaise/fatigue.   Respiratory: Positive for cough. Negative for hemoptysis, sputum production and shortness of breath.    Cardiovascular: Positive for chest pain. Negative for palpitations, orthopnea, claudication, syncope, PND and near-syncope.   Gastrointestinal: Negative for abdominal pain, nausea and vomiting.   Musculoskeletal: Negative for back pain.   Neurological: Negative for dizziness, weakness, numbness and headaches.          Objective:     /78   Pulse 78   Temp 36.8 °C (98.3 °F)   Resp 14   Ht 1.549 m (5' 1\")   Wt 64.4 kg (142 lb)   SpO2 97%   BMI 26.83 kg/m²      Physical Exam   Constitutional: She is oriented to person, place, and time. She appears well-developed and well-nourished.   HENT:   Head: Normocephalic and atraumatic.   Eyes: Conjunctivae and EOM are normal. Pupils are equal, round, and reactive to light.   Neck: Normal range of motion. Neck supple.   Cardiovascular: Normal rate, regular rhythm and normal heart sounds.  Exam reveals no friction rub.    No murmur heard.  Pulmonary/Chest: Effort normal and breath sounds normal. No respiratory distress. " She has no wheezes. She has no rales.   Abdominal: Soft. Bowel sounds are normal. She exhibits no distension. There is no tenderness.   Neurological: She is alert and oriented to person, place, and time.   Skin: Skin is warm and dry.   Psychiatric: She has a normal mood and affect. Her behavior is normal.   Nursing note and vitals reviewed.              Assessment/Plan:     1. Angina pectoris (CMS-HCC)  Will have her referred to cardiology, she has been advised to go to the er if she has any recurrence of her CP. An EKG will be done today. Will continue to follow.     - REFERRAL TO CARDIOLOGY    2. Dyslipidemia  Will have her continue to take her medication as directed. She has been advised to increase the fibers in his diet, avoid fatty/fried foods and try fish oil supplements if not allergic to seafood. Also advised to exercise as tolerated.     - REFERRAL TO CARDIOLOGY    3. Essential hypertension  Will have her continue to take her medication as directed. She has been advised to monitor blood pressure at home and keep notes. If blood pressure elevated or having symptoms of CP, SOB or neurologic changes to go to the er.    - REFERRAL TO CARDIOLOGY    4. DM (diabetes mellitus) with complications (CMS-HCC)  Will have her continue to use her medications as directed. Will recheck her A1c and will have her check her feet daily for any skin changes or changes in sensation. A1c today was 6.6.

## 2018-04-25 ENCOUNTER — HOSPITAL ENCOUNTER (OUTPATIENT)
Facility: MEDICAL CENTER | Age: 58
End: 2018-04-26
Attending: INTERNAL MEDICINE | Admitting: INTERNAL MEDICINE
Payer: MEDICAID

## 2018-04-25 LAB
ALBUMIN SERPL BCP-MCNC: 3.8 G/DL (ref 3.2–4.9)
ALBUMIN/GLOB SERPL: 1.4 G/DL
ALP SERPL-CCNC: 88 U/L (ref 30–99)
ALT SERPL-CCNC: 23 U/L (ref 2–50)
ANION GAP SERPL CALC-SCNC: 8 MMOL/L (ref 0–11.9)
AST SERPL-CCNC: 17 U/L (ref 12–45)
BILIRUB SERPL-MCNC: 0.4 MG/DL (ref 0.1–1.5)
BUN SERPL-MCNC: 15 MG/DL (ref 8–22)
CALCIUM SERPL-MCNC: 9.2 MG/DL (ref 8.5–10.5)
CHLORIDE SERPL-SCNC: 106 MMOL/L (ref 96–112)
CO2 SERPL-SCNC: 23 MMOL/L (ref 20–33)
CREAT SERPL-MCNC: 0.56 MG/DL (ref 0.5–1.4)
EKG IMPRESSION: NORMAL
ERYTHROCYTE [DISTWIDTH] IN BLOOD BY AUTOMATED COUNT: 45.1 FL (ref 35.9–50)
GLOBULIN SER CALC-MCNC: 2.8 G/DL (ref 1.9–3.5)
GLUCOSE BLD-MCNC: 108 MG/DL (ref 65–99)
GLUCOSE BLD-MCNC: 119 MG/DL (ref 65–99)
GLUCOSE BLD-MCNC: 124 MG/DL (ref 65–99)
GLUCOSE BLD-MCNC: 163 MG/DL (ref 65–99)
GLUCOSE BLD-MCNC: 193 MG/DL (ref 65–99)
GLUCOSE SERPL-MCNC: 226 MG/DL (ref 65–99)
HCT VFR BLD AUTO: 40.4 % (ref 37–47)
HGB BLD-MCNC: 13.2 G/DL (ref 12–16)
MCH RBC QN AUTO: 28.7 PG (ref 27–33)
MCHC RBC AUTO-ENTMCNC: 32.7 G/DL (ref 33.6–35)
MCV RBC AUTO: 87.8 FL (ref 81.4–97.8)
PLATELET # BLD AUTO: 221 K/UL (ref 164–446)
PMV BLD AUTO: 10.4 FL (ref 9–12.9)
POTASSIUM SERPL-SCNC: 3.7 MMOL/L (ref 3.6–5.5)
PROT SERPL-MCNC: 6.6 G/DL (ref 6–8.2)
RBC # BLD AUTO: 4.6 M/UL (ref 4.2–5.4)
SODIUM SERPL-SCNC: 137 MMOL/L (ref 135–145)
TROPONIN I SERPL-MCNC: <0.01 NG/ML (ref 0–0.04)
TROPONIN I SERPL-MCNC: <0.01 NG/ML (ref 0–0.04)
WBC # BLD AUTO: 7.7 K/UL (ref 4.8–10.8)

## 2018-04-25 PROCEDURE — 360979 HCHG DIAGNOSTIC CATH

## 2018-04-25 PROCEDURE — 96372 THER/PROPH/DIAG INJ SC/IM: CPT | Mod: XU

## 2018-04-25 PROCEDURE — 93010 ELECTROCARDIOGRAM REPORT: CPT | Performed by: INTERNAL MEDICINE

## 2018-04-25 PROCEDURE — A9270 NON-COVERED ITEM OR SERVICE: HCPCS | Performed by: INTERNAL MEDICINE

## 2018-04-25 PROCEDURE — 700105 HCHG RX REV CODE 258: Performed by: INTERNAL MEDICINE

## 2018-04-25 PROCEDURE — 700101 HCHG RX REV CODE 250

## 2018-04-25 PROCEDURE — 700102 HCHG RX REV CODE 250 W/ 637 OVERRIDE(OP): Performed by: INTERNAL MEDICINE

## 2018-04-25 PROCEDURE — 85027 COMPLETE CBC AUTOMATED: CPT

## 2018-04-25 PROCEDURE — 700111 HCHG RX REV CODE 636 W/ 250 OVERRIDE (IP)

## 2018-04-25 PROCEDURE — C1769 GUIDE WIRE: HCPCS

## 2018-04-25 PROCEDURE — A9270 NON-COVERED ITEM OR SERVICE: HCPCS | Performed by: NURSE PRACTITIONER

## 2018-04-25 PROCEDURE — 80053 COMPREHEN METABOLIC PANEL: CPT

## 2018-04-25 PROCEDURE — 700117 HCHG RX CONTRAST REV CODE 255: Performed by: INTERNAL MEDICINE

## 2018-04-25 PROCEDURE — 93458 L HRT ARTERY/VENTRICLE ANGIO: CPT

## 2018-04-25 PROCEDURE — 93005 ELECTROCARDIOGRAM TRACING: CPT | Performed by: HOSPITALIST

## 2018-04-25 PROCEDURE — 82962 GLUCOSE BLOOD TEST: CPT

## 2018-04-25 PROCEDURE — G0378 HOSPITAL OBSERVATION PER HR: HCPCS

## 2018-04-25 PROCEDURE — 99152 MOD SED SAME PHYS/QHP 5/>YRS: CPT

## 2018-04-25 PROCEDURE — C1894 INTRO/SHEATH, NON-LASER: HCPCS

## 2018-04-25 PROCEDURE — 84484 ASSAY OF TROPONIN QUANT: CPT | Mod: 91

## 2018-04-25 PROCEDURE — 700102 HCHG RX REV CODE 250 W/ 637 OVERRIDE(OP): Performed by: NURSE PRACTITIONER

## 2018-04-25 PROCEDURE — 93005 ELECTROCARDIOGRAM TRACING: CPT | Performed by: INTERNAL MEDICINE

## 2018-04-25 PROCEDURE — 700111 HCHG RX REV CODE 636 W/ 250 OVERRIDE (IP): Performed by: INTERNAL MEDICINE

## 2018-04-25 PROCEDURE — 700111 HCHG RX REV CODE 636 W/ 250 OVERRIDE (IP): Performed by: NURSE PRACTITIONER

## 2018-04-25 RX ORDER — ONDANSETRON 4 MG/1
4 TABLET, ORALLY DISINTEGRATING ORAL EVERY 4 HOURS PRN
Status: DISCONTINUED | OUTPATIENT
Start: 2018-04-25 | End: 2018-04-26 | Stop reason: HOSPADM

## 2018-04-25 RX ORDER — SCOLOPAMINE TRANSDERMAL SYSTEM 1 MG/1
1 PATCH, EXTENDED RELEASE TRANSDERMAL
Status: DISCONTINUED | OUTPATIENT
Start: 2018-04-25 | End: 2018-04-26 | Stop reason: HOSPADM

## 2018-04-25 RX ORDER — DEXTROSE MONOHYDRATE 25 G/50ML
25 INJECTION, SOLUTION INTRAVENOUS
Status: DISCONTINUED | OUTPATIENT
Start: 2018-04-25 | End: 2018-04-26 | Stop reason: HOSPADM

## 2018-04-25 RX ORDER — POLYETHYLENE GLYCOL 3350 17 G/17G
1 POWDER, FOR SOLUTION ORAL
Status: DISCONTINUED | OUTPATIENT
Start: 2018-04-25 | End: 2018-04-26 | Stop reason: HOSPADM

## 2018-04-25 RX ORDER — ALBUTEROL SULFATE 90 UG/1
2 AEROSOL, METERED RESPIRATORY (INHALATION) EVERY 6 HOURS PRN
Status: DISCONTINUED | OUTPATIENT
Start: 2018-04-25 | End: 2018-04-26 | Stop reason: HOSPADM

## 2018-04-25 RX ORDER — ATORVASTATIN CALCIUM 40 MG/1
40 TABLET, FILM COATED ORAL
Status: DISCONTINUED | OUTPATIENT
Start: 2018-04-25 | End: 2018-04-26 | Stop reason: HOSPADM

## 2018-04-25 RX ORDER — VERAPAMIL HYDROCHLORIDE 2.5 MG/ML
INJECTION, SOLUTION INTRAVENOUS
Status: COMPLETED
Start: 2018-04-25 | End: 2018-04-25

## 2018-04-25 RX ORDER — ASPIRIN 81 MG/1
81 TABLET, CHEWABLE ORAL DAILY
Status: DISCONTINUED | OUTPATIENT
Start: 2018-04-25 | End: 2018-04-25

## 2018-04-25 RX ORDER — ONDANSETRON 2 MG/ML
4 INJECTION INTRAMUSCULAR; INTRAVENOUS EVERY 4 HOURS PRN
Status: DISCONTINUED | OUTPATIENT
Start: 2018-04-25 | End: 2018-04-25

## 2018-04-25 RX ORDER — MIDAZOLAM HYDROCHLORIDE 1 MG/ML
INJECTION INTRAMUSCULAR; INTRAVENOUS
Status: COMPLETED
Start: 2018-04-25 | End: 2018-04-25

## 2018-04-25 RX ORDER — HALOPERIDOL 5 MG/ML
1 INJECTION INTRAMUSCULAR EVERY 6 HOURS PRN
Status: DISCONTINUED | OUTPATIENT
Start: 2018-04-25 | End: 2018-04-26 | Stop reason: HOSPADM

## 2018-04-25 RX ORDER — DEXAMETHASONE SODIUM PHOSPHATE 4 MG/ML
4 INJECTION, SOLUTION INTRA-ARTICULAR; INTRALESIONAL; INTRAMUSCULAR; INTRAVENOUS; SOFT TISSUE
Status: DISCONTINUED | OUTPATIENT
Start: 2018-04-25 | End: 2018-04-26 | Stop reason: HOSPADM

## 2018-04-25 RX ORDER — LISINOPRIL 10 MG/1
10 TABLET ORAL DAILY
Status: DISCONTINUED | OUTPATIENT
Start: 2018-04-25 | End: 2018-04-26 | Stop reason: HOSPADM

## 2018-04-25 RX ORDER — DIPHENHYDRAMINE HYDROCHLORIDE 50 MG/ML
25 INJECTION INTRAMUSCULAR; INTRAVENOUS EVERY 6 HOURS PRN
Status: DISCONTINUED | OUTPATIENT
Start: 2018-04-25 | End: 2018-04-25

## 2018-04-25 RX ORDER — SODIUM CHLORIDE 9 MG/ML
INJECTION, SOLUTION INTRAVENOUS CONTINUOUS
Status: DISCONTINUED | OUTPATIENT
Start: 2018-04-25 | End: 2018-04-25

## 2018-04-25 RX ORDER — IBUPROFEN 200 MG
400 TABLET ORAL
Status: ON HOLD | COMMUNITY
End: 2018-04-26

## 2018-04-25 RX ORDER — LIDOCAINE HYDROCHLORIDE 20 MG/ML
INJECTION, SOLUTION INFILTRATION; PERINEURAL
Status: COMPLETED
Start: 2018-04-25 | End: 2018-04-25

## 2018-04-25 RX ORDER — SODIUM CHLORIDE 9 MG/ML
INJECTION, SOLUTION INTRAVENOUS CONTINUOUS
Status: ACTIVE | OUTPATIENT
Start: 2018-04-25 | End: 2018-04-25

## 2018-04-25 RX ORDER — ONDANSETRON 2 MG/ML
4 INJECTION INTRAMUSCULAR; INTRAVENOUS EVERY 4 HOURS PRN
Status: DISCONTINUED | OUTPATIENT
Start: 2018-04-25 | End: 2018-04-26 | Stop reason: HOSPADM

## 2018-04-25 RX ORDER — BISACODYL 10 MG
10 SUPPOSITORY, RECTAL RECTAL
Status: DISCONTINUED | OUTPATIENT
Start: 2018-04-25 | End: 2018-04-26 | Stop reason: HOSPADM

## 2018-04-25 RX ORDER — PROMETHAZINE HYDROCHLORIDE 25 MG/1
12.5-25 TABLET ORAL EVERY 4 HOURS PRN
Status: DISCONTINUED | OUTPATIENT
Start: 2018-04-25 | End: 2018-04-26 | Stop reason: HOSPADM

## 2018-04-25 RX ORDER — HEPARIN SODIUM 5000 [USP'U]/ML
5000 INJECTION, SOLUTION INTRAVENOUS; SUBCUTANEOUS EVERY 8 HOURS
Status: DISCONTINUED | OUTPATIENT
Start: 2018-04-25 | End: 2018-04-26 | Stop reason: HOSPADM

## 2018-04-25 RX ORDER — PROMETHAZINE HYDROCHLORIDE 12.5 MG/1
12.5-25 SUPPOSITORY RECTAL EVERY 4 HOURS PRN
Status: DISCONTINUED | OUTPATIENT
Start: 2018-04-25 | End: 2018-04-26 | Stop reason: HOSPADM

## 2018-04-25 RX ORDER — HEPARIN SODIUM,PORCINE 1000/ML
VIAL (ML) INJECTION
Status: COMPLETED
Start: 2018-04-25 | End: 2018-04-25

## 2018-04-25 RX ORDER — AMOXICILLIN 250 MG
2 CAPSULE ORAL 2 TIMES DAILY
Status: DISCONTINUED | OUTPATIENT
Start: 2018-04-25 | End: 2018-04-26 | Stop reason: HOSPADM

## 2018-04-25 RX ORDER — METOPROLOL SUCCINATE 25 MG/1
25 TABLET, EXTENDED RELEASE ORAL DAILY
Status: DISCONTINUED | OUTPATIENT
Start: 2018-04-25 | End: 2018-04-26 | Stop reason: HOSPADM

## 2018-04-25 RX ORDER — ACETAMINOPHEN 325 MG/1
650 TABLET ORAL EVERY 6 HOURS PRN
Status: DISCONTINUED | OUTPATIENT
Start: 2018-04-25 | End: 2018-04-26 | Stop reason: HOSPADM

## 2018-04-25 RX ORDER — OMEPRAZOLE 20 MG/1
20 CAPSULE, DELAYED RELEASE ORAL DAILY
Status: DISCONTINUED | OUTPATIENT
Start: 2018-04-25 | End: 2018-04-25

## 2018-04-25 RX ORDER — OMEPRAZOLE 20 MG/1
20 CAPSULE, DELAYED RELEASE ORAL 2 TIMES DAILY
Status: DISCONTINUED | OUTPATIENT
Start: 2018-04-25 | End: 2018-04-26 | Stop reason: HOSPADM

## 2018-04-25 RX ORDER — ATORVASTATIN CALCIUM 20 MG/1
20 TABLET, FILM COATED ORAL
Status: DISCONTINUED | OUTPATIENT
Start: 2018-04-25 | End: 2018-04-25

## 2018-04-25 RX ORDER — DIPHENHYDRAMINE HYDROCHLORIDE 50 MG/ML
25 INJECTION INTRAMUSCULAR; INTRAVENOUS EVERY 6 HOURS PRN
Status: DISCONTINUED | OUTPATIENT
Start: 2018-04-25 | End: 2018-04-26 | Stop reason: HOSPADM

## 2018-04-25 RX ADMIN — OMEPRAZOLE 20 MG: 20 CAPSULE, DELAYED RELEASE ORAL at 09:49

## 2018-04-25 RX ADMIN — HEPARIN SODIUM 5000 UNITS: 5000 INJECTION, SOLUTION INTRAVENOUS; SUBCUTANEOUS at 07:03

## 2018-04-25 RX ADMIN — ASPIRIN 81 MG: 81 TABLET, CHEWABLE ORAL at 09:49

## 2018-04-25 RX ADMIN — ATORVASTATIN CALCIUM 40 MG: 40 TABLET, FILM COATED ORAL at 20:52

## 2018-04-25 RX ADMIN — HEPARIN SODIUM 5000 UNITS: 5000 INJECTION, SOLUTION INTRAVENOUS; SUBCUTANEOUS at 20:52

## 2018-04-25 RX ADMIN — FENTANYL CITRATE 50 MCG: 50 INJECTION, SOLUTION INTRAMUSCULAR; INTRAVENOUS at 17:09

## 2018-04-25 RX ADMIN — ACETAMINOPHEN 650 MG: 325 TABLET, FILM COATED ORAL at 12:54

## 2018-04-25 RX ADMIN — OMEPRAZOLE 20 MG: 20 CAPSULE, DELAYED RELEASE ORAL at 20:52

## 2018-04-25 RX ADMIN — IOHEXOL 83 ML: 350 INJECTION, SOLUTION INTRAVENOUS at 17:09

## 2018-04-25 RX ADMIN — HEPARIN SODIUM: 1000 INJECTION, SOLUTION INTRAVENOUS; SUBCUTANEOUS at 16:58

## 2018-04-25 RX ADMIN — NITROGLYCERIN 10 ML: 20 INJECTION INTRAVENOUS at 16:58

## 2018-04-25 RX ADMIN — METOPROLOL SUCCINATE 25 MG: 25 TABLET, EXTENDED RELEASE ORAL at 09:00

## 2018-04-25 RX ADMIN — MIDAZOLAM 1.5 MG: 1 INJECTION INTRAMUSCULAR; INTRAVENOUS at 17:09

## 2018-04-25 RX ADMIN — LISINOPRIL 10 MG: 10 TABLET ORAL at 09:50

## 2018-04-25 RX ADMIN — LIDOCAINE HYDROCHLORIDE: 20 INJECTION, SOLUTION INFILTRATION; PERINEURAL at 16:58

## 2018-04-25 RX ADMIN — DIPHENHYDRAMINE HYDROCHLORIDE 25 MG: 50 INJECTION, SOLUTION INTRAMUSCULAR; INTRAVENOUS at 23:26

## 2018-04-25 RX ADMIN — SENNOSIDES AND DOCUSATE SODIUM 2 TABLET: 8.6; 5 TABLET ORAL at 20:52

## 2018-04-25 RX ADMIN — SODIUM CHLORIDE: 9 INJECTION, SOLUTION INTRAVENOUS at 01:20

## 2018-04-25 ASSESSMENT — ENCOUNTER SYMPTOMS
FEVER: 0
STRIDOR: 0
SEIZURES: 0
DIZZINESS: 0
MYALGIAS: 0
ABDOMINAL PAIN: 0
TINGLING: 1
COUGH: 0
FOCAL WEAKNESS: 0
BACK PAIN: 1
HEMOPTYSIS: 0
NECK PAIN: 1
DIARRHEA: 0
WEIGHT LOSS: 0
DIAPHORESIS: 0
HEARTBURN: 0
ORTHOPNEA: 0
PHOTOPHOBIA: 0
PALPITATIONS: 0
NAUSEA: 0
HEADACHES: 0
SHORTNESS OF BREATH: 0
BLOOD IN STOOL: 0
SORE THROAT: 0
PND: 0
WHEEZING: 0
CHILLS: 0
DOUBLE VISION: 0
VOMITING: 0
BRUISES/BLEEDS EASILY: 0
BLURRED VISION: 0
SENSORY CHANGE: 0

## 2018-04-25 ASSESSMENT — LIFESTYLE VARIABLES
EVER_SMOKED: YES
ALCOHOL_USE: NO

## 2018-04-25 ASSESSMENT — PATIENT HEALTH QUESTIONNAIRE - PHQ9
2. FEELING DOWN, DEPRESSED, IRRITABLE, OR HOPELESS: NOT AT ALL
1. LITTLE INTEREST OR PLEASURE IN DOING THINGS: NOT AT ALL
SUM OF ALL RESPONSES TO PHQ9 QUESTIONS 1 AND 2: 0

## 2018-04-25 ASSESSMENT — PAIN SCALES - GENERAL
PAINLEVEL_OUTOF10: 0
PAINLEVEL_OUTOF10: 0

## 2018-04-25 NOTE — PROGRESS NOTES
Full assessment completed. Pt A+Ox4, primarily Estonian speaking but can communicate needs and understand plan of care in English. Currently denying any chest pain/SOB. VSS. NS started at 75/hr via new PIV placed in left hand. PIV on arrival painful to flush, removed by RN. EKG completed, no changes from OSH. Updated on plan of care, oriented to call light and encouraged to ask for assistance.

## 2018-04-25 NOTE — PROGRESS NOTES
"Pt called RN to room c/o chest \"poking\"  that has now subsided; C/o HA also. Medicated for headache; Trop drawn and new ekg obtained; Will closely monitor. Mitch STEWART updated.   "

## 2018-04-25 NOTE — H&P
Hospital Medicine History and Physical      Date of Service  4/24/2018    Chief Complaint  Chief Complaint   Patient presents with   • Sent by MD   • Chest Pain       History of Presenting Illness  Albert is a 57 y.o. female PMH of atypical chest pain, DMII, who presents with exertional chest pain since Sunday. She described the pain As sharp pain over her left side of the chest,9/10 intensity, intermittent, radiated to her left and neck area. Currently she is chest pain-free. She had stress test done in August 2017 and it was negative. She was also evaluated by Dr. MELVIN last year. Her initial workup were negative. Discussed with Dr. Hernández and he recommended to transfer the patient to [facility] for angiogram.    Primary Care Physician  Keon Talamantes M.D.      Code Status  Full code    Review of Systems  Review of Systems   Constitutional: Negative for chills, fever and weight loss.   HENT: Negative for congestion and nosebleeds.    Eyes: Negative for blurred vision, pain, discharge and redness.   Respiratory: Negative for cough, sputum production, shortness of breath and stridor.    Cardiovascular: Positive for chest pain. Negative for palpitations and orthopnea.   Gastrointestinal: Negative for abdominal pain, diarrhea, heartburn, nausea and vomiting.   Genitourinary: Negative for dysuria, frequency and urgency.   Musculoskeletal: Negative for back pain, myalgias and neck pain.   Skin: Negative for itching and rash.   Neurological: Negative for dizziness, focal weakness, seizures and headaches.   Psychiatric/Behavioral: Negative for depression. The patient is not nervous/anxious and does not have insomnia.      Please see HPI, all other systems were reviewed and are negative (AMA/CMS criteria)     Past Medical History  Past Medical History:   Diagnosis Date   • Allergy    • Atopic dermatitis    • Depression    • Diabetes (CMS-HCC)    • Gastritis    • Hyperlipidemia    • Hypertension        Surgical History  Past  Surgical History:   Procedure Laterality Date   • CHOLECYSTECTOMY  2007   • OTHER ORTHOPEDIC SURGERY  2007    shoulder surgery       Medications  No current facility-administered medications on file prior to encounter.      Current Outpatient Prescriptions on File Prior to Encounter   Medication Sig Dispense Refill   • lisinopril (PRINIVIL) 10 MG Tab TAKE 1 TAB BY MOUTH EVERY DAY. 30 Tab 3   • JARDIANCE 10 MG Tab TAKE 10 MG BY MOUTH EVERY DAY. 30 Tab 3   • betamethasone dipropionate (DIPROLENE) 0.05 % Cream Apply  to affected area(s) 2 times a day.     • Misc. Devices Misc Trumetrix Glucometer and test strips and lancets to use once daily or glucometer and test strips covered by her insurance ([employer]) #50 50 Device 6   • ondansetron (ZOFRAN ODT) 4 MG TABLET DISPERSIBLE Take 1 Tab by mouth every 6 hours as needed for Nausea. 20 Tab 0   • metformin (GLUCOPHAGE) 500 MG Tab Take 1 Tab by mouth 2 times a day, with meals. 60 Tab 3   • atorvastatin (LIPITOR) 20 MG Tab Take 1 Tab by mouth every day. 30 Tab 6   • aspirin (ASA) 81 MG Chew Tab chewable tablet Take 1 Tab by mouth every day. 100 Tab 3   • vitamin D, Ergocalciferol, (DRISDOL) 23603 units Cap capsule Take 1 Cap by mouth every 28 days. 3 Cap 3   • omeprazole (PRILOSEC) 20 MG Tablet Delayed Response delayed-release tablet Take 1 Tab by mouth every day. 30 Tab 6   • Omega-3 Fatty Acids (FISH OIL) 1000 MG Cap capsule Take 1 Cap by mouth 3 times a day, with meals. (Patient taking differently: Take 1,000 mg by mouth every day.) 90 Cap 0   • albuterol 108 (90 BASE) MCG/ACT Aero Soln inhalation aerosol Inhale 2 Puffs by mouth every 6 hours as needed for Shortness of Breath.     • metoprolol SR (TOPROL XL) 25 MG TABLET SR 24 HR Take 1 Tab by mouth every day. 30 Tab 3     Family History  Family History   Problem Relation Age of Onset   • Cancer Mother      lung cancer, chronic smoker   • Lung Disease Mother    • Heart Attack Mother    • Diabetes Father          Social  History  Social History   Substance Use Topics   • Smoking status: Former Smoker     Packs/day: 1.00     Years: 15.00     Quit date: 2002   • Smokeless tobacco: Never Used   • Alcohol use No      Comment: 1 beer / month       Allergies  Allergies   Allergen Reactions   • Shellfish Allergy Hives and Rash     Shellfish        Physical Exam  Laboratory   Hemodynamics  Temp (24hrs), Av.9 °C (98.5 °F), Min:36.9 °C (98.5 °F), Max:36.9 °C (98.5 °F)   Temperature: 36.9 °C (98.5 °F)  Pulse  Av.5  Min: 68  Max: 79 Heart Rate (Monitored): 75  Blood Pressure: 146/83, NIBP: 127/73      Respiratory      Respiration: 20, Pulse Oximetry: 93 %             Physical Exam   Constitutional: She is oriented to person, place, and time. No distress.   HENT:   Head: Normocephalic and atraumatic.   Mouth/Throat: Oropharynx is clear and moist.   Eyes: Conjunctivae and EOM are normal. Pupils are equal, round, and reactive to light.   Neck: Normal range of motion. Neck supple. No tracheal deviation present. No thyromegaly present.   Cardiovascular: Normal rate and regular rhythm.    No murmur heard.  Pulmonary/Chest: Effort normal and breath sounds normal. No respiratory distress. She has no wheezes.   Abdominal: Soft. Bowel sounds are normal. She exhibits no distension. There is no tenderness.   Musculoskeletal: She exhibits no edema or tenderness.   Neurological: She is alert and oriented to person, place, and time. No cranial nerve deficit.   Skin: Skin is warm and dry. She is not diaphoretic. No erythema.   Psychiatric: She has a normal mood and affect. Her behavior is normal. Thought content normal.       Recent Labs      18   0142   WBC  8.3   RBC  5.05   HEMOGLOBIN  14.6   HEMATOCRIT  43.9   MCV  86.9   MCH  28.9   MCHC  33.3*   RDW  45.0   PLATELETCT  226   MPV  10.5     Recent Labs      18   0142   SODIUM  135   POTASSIUM  3.9   CHLORIDE  106   CO2  22   GLUCOSE  140*   BUN  19   CREATININE  0.80   CALCIUM   9.1     Recent Labs      04/24/18 0142   ALTSGPT  36   ASTSGOT  27   ALKPHOSPHAT  109*   TBILIRUBIN  0.5   LIPASE  36   GLUCOSE  140*     Recent Labs      04/24/18   0142   APTT  32.1   INR  0.96     Recent Labs      04/24/18 0142   BNPBTYPENAT  9         Lab Results   Component Value Date    TROPONINI <0.02 04/24/2018       Imaging  DX-CHEST-LIMITED (1 VIEW)   Final Result      1.  Mild bibasilar atelectasis.   2.  No pneumonia or pneumothorax.        EKG  per my independant read:  QTc: 433, HR: 84, Normal Sinus Rhythm, no ST/T changes     Assessment/Plan     I anticipate this patient is appropriate for observation status at this time.    Chest pain- (present on admission)   Assessment & Plan    NPI 8/17: negative  Consulted Dr. King and recommended angiogram  Will transfer her to regional        Essential hypertension- (present on admission)   Assessment & Plan    stable        Type 2 diabetes mellitus (HCC)- (present on admission)   Assessment & Plan    On insulin tx            Prophylaxis:  sc heparin

## 2018-04-25 NOTE — PROGRESS NOTES
Bedside report received 0720. POC discussed with pt and son, Mau; Pt denies CP; C/o HA that is on and off, declines intervention; Pt NPO for cath procedure; all questions answered at this time.

## 2018-04-25 NOTE — PROGRESS NOTES
Cath lab contacted for status of cath procedure schedule. Possible cath today pt to remain NPO. Pt and family updated.

## 2018-04-25 NOTE — PROGRESS NOTES
Med Rec complete per PT at bedside   Allergies Reviewed  No ABX in the last 30 days  Ok per Pt to discuss medications with visitor/s present

## 2018-04-25 NOTE — ED NOTES
Pt denies new or worsening symptoms. Visiting with family at bedside. Agreeable to transfer to [facility]. Awaiting room assignment.

## 2018-04-25 NOTE — PROGRESS NOTES
Direct admit from [facility], Dr. Lee for Chest pain.  Cardiac cath procedure in am.  Discharge and readmit orders signed and held, need to be released upon pt arrival.  Pt coming by ground.

## 2018-04-25 NOTE — CONSULTS
Cardiology Initial Consult Note    Date of note:    4/25/2018      Consulting Physician: Eloy Wells M.D.    Patient ID:    Name:   Shahida Price     YOB: 1960  Age:   57 y.o.  female   MRN:   [id number]      Reason for Consultation: chest pain    HPI:  Shahida Price is a 57 y.o.-year-old female with a history of diabetes, hypertension, and dyslipidemia who presented today with chest pain.  She reports on Sunday she had acute onset of 9/10 exertional chest pain radiating to her neck and arms which resolved with rest. She had a similar episode 2 weeks ago, which was less severe.  There were no other associated symptoms.  She did not try nitroglycerin.    Per report, years ago she has had a previous stress test, which was negative.           ROS  Constitution: Negative for chills, fever and night sweats.   HENT: Negative for nosebleeds.    Eyes: Negative for vision loss in left eye and vision loss in right eye.   Respiratory: Negative for hemoptysis.    Gastrointestinal: Negative for hematemesis, hematochezia and melena.   Genitourinary: Negative for hematuria.   Neurological: Negative for focal weakness, numbness and paresthesias.      All others reviewed and negative.      Past Medical History:   Diagnosis Date   • Allergy    • Atopic dermatitis    • Depression    • Diabetes (CMS-HCC)    • Gastritis    • Hyperlipidemia    • Hypertension        Past Surgical History:   Procedure Laterality Date   • CHOLECYSTECTOMY  2007   • OTHER ORTHOPEDIC SURGERY  2007    shoulder surgery         Prescriptions Prior to Admission   Medication Sig Dispense Refill Last Dose   • ibuprofen (MOTRIN) 200 MG Tab Take 400 mg by mouth at bedtime as needed (PAIN).   > 1 WEEK at PRN   • lisinopril (PRINIVIL) 10 MG Tab TAKE 1 TAB BY MOUTH EVERY DAY. 30 Tab 3 4/23/2018 at 1100   • JARDIANCE 10 MG Tab TAKE 10 MG BY MOUTH EVERY DAY. 30 Tab 3 4/23/2018 at 1100   • betamethasone dipropionate (DIPROLENE) 0.05 % Cream Apply 1 Application to  "affected area(s) 2 times a day. Apply to wrist   4/23/2018 at 2100   • metformin (GLUCOPHAGE) 500 MG Tab Take 1 Tab by mouth 2 times a day, with meals. 60 Tab 3 4/23/2018 at 2100   • atorvastatin (LIPITOR) 20 MG Tab Take 1 Tab by mouth every day. 30 Tab 6 4/23/2018 at 1100   • aspirin (ASA) 81 MG Chew Tab chewable tablet Take 1 Tab by mouth every day. 100 Tab 3 4/23/2018 at 2100   • vitamin D, Ergocalciferol, (DRISDOL) 75300 units Cap capsule Take 1 Cap by mouth every 28 days. 3 Cap 3 3/28/2018 at UNK   • omeprazole (PRILOSEC) 20 MG Tablet Delayed Response delayed-release tablet Take 1 Tab by mouth every day. 30 Tab 6 4/22/2018 at PM   • albuterol 108 (90 BASE) MCG/ACT Aero Soln inhalation aerosol Inhale 2 Puffs by mouth every 6 hours as needed for Shortness of Breath.   4/23/2018 at 2100           Allergies   Allergen Reactions   • Shellfish Allergy Hives and Rash     Shellfish         Family History   Problem Relation Age of Onset   • Cancer Mother      lung cancer, chronic smoker   • Lung Disease Mother    • Heart Attack Mother    • Diabetes Father          Social History     Social History   • Marital status:      Spouse name: N/A   • Number of children: N/A   • Years of education: N/A     Occupational History   • Not on file.     Social History Main Topics   • Smoking status: Former Smoker     Packs/day: 1.00     Years: 15.00     Quit date: 4/17/2002   • Smokeless tobacco: Never Used   • Alcohol use No      Comment: 1 beer / month   • Drug use: No   • Sexual activity: Yes     Partners: Male      Comment: menopause 2015     Other Topics Concern   • Not on file     Social History Narrative   • No narrative on file         Physical Exam  Body mass index is 26.83 kg/m².  Blood pressure 139/65, pulse 67, temperature 36.8 °C (98.2 °F), resp. rate 15, height 1.549 m (5' 1\"), weight 64.4 kg (141 lb 15.6 oz), SpO2 95 %, not currently breastfeeding.  Vitals:    04/25/18 0400 04/25/18 0804 04/25/18 1234 04/25/18 " 1557   BP:  114/63 123/67 139/65   Pulse:  83 74 67   Resp:  16 15 15   Temp:  36.6 °C (97.9 °F) 36.6 °C (97.8 °F) 36.8 °C (98.2 °F)   SpO2:  95% 93% 95%   Weight: 64.4 kg (141 lb 15.6 oz)      Height:         Oxygen Therapy:  Pulse Oximetry: 95 %, O2 (LPM): 0, O2 Delivery: None (Room Air)    General: Well appearing and in no apparent distress  Eyes: nl conjunctiva  ENT: OP clear  Neck: JVP 4 cm H2O, no carotid bruits  Lungs: normal respiratory effort, CTAB  Heart: RRR, no murmurs, no rubs or gallops, no edema bilateral lower extremities. No LV/RV heave on cardiac palpatation. 2+ bilateral radial pulses.  2+ bilateral dp pulses.   Abdomen: soft, non tender, non distended, no masses, normal bowel sounds.  No HSM.  Extremities/MSK: no clubbing, no cyanosis  Neurological: No focal sensory deficits  Psychiatric: Appropriate affect, A/O x 3  Skin: Warm extremities        Labs (personally reviewed and notable for):   Trop negative      Cardiac Imaging and Procedures Review:    EKG dated 4/25/2018: My personal interpretation is NSR    Echo dated 8/2017:  Normal left ventricular systolic function.  Left ventricular ejection fraction is visually estimated to be 70%.  Grade I diastolic dysfunction.  The right ventricle was normal in size and function.  No significant valve disease or flow abnormalities.       Nuclear Perfusion Imaging (8/2017):    Myocardial Perfusion   Report   NUCLEAR IMAGING INTERPRETATION   Normal myocardial perfusion with no ischemia.   Normal left ventricular wall motion.  LV ejection fraction = 70%.   ECG INTERPRETATION   Negative stress ECG for ischemia.      Radiology test Review:  CXR: 1.  Mild bibasilar atelectasis.  2.  No pneumonia or pneumothorax.           Impression and Medical Decision Making:  # Unstable Angina, continued and worsening symptoms despite previous negative stress test and outpatient medical management.  # Hypertension  # diabetes    Recommendations:  # C, please keep NPO  except meds  # Aspirin 81mg PO Daily  # DAPT to be started post-[facility] if intervention performed  # continue lisinopril 10mg PO Daily  # continue metoprolol  # continue lipitor  # given lack of EKG changes/trop elevation, no need for heparin gtt at this time, start if symptoms recur.     Discussed with Dr. Wells      Thank you for allowing me to participate in the care of this patient, I will continue to follow.  Please contact me with any questions.      Sonido Sanchez MD  Cardiologist, [facility] [place]   [phone]

## 2018-04-25 NOTE — ED NOTES
Pt bib family with c/o intermittent CP that radiates to her left shoulder and arm since 4/22. Pt denies SOB or difficulty breathing. Pt was seen by her PCP and advised to be seen in the ED for further evaluation

## 2018-04-25 NOTE — ED NOTES
Report called to Evonne , receiving [facility] RN. Will await [facility] transport.

## 2018-04-25 NOTE — PROGRESS NOTES
Cath lab contacted for status of acth procedure, No orders; Updated TERRY Tavarez and Dr. Garcia. VO to keep pt NPO until procedure order is clarified with cardiology.

## 2018-04-25 NOTE — NON-PROVIDER
Cardiology Initial Consult:                                        Note Author:   Dru Daniel, Medical Student   Date & Time note created:    4/25/2018  1:39 PM          Patient ID:  Name:  Shahida Price           YOB: 1960  Age: 56yo                             MRN:  [id number]                                                                             History of Present Illness:    Ms. Price is a 55 y/o  female with a significant past medical history of DM, HTN, Hyperlipidemia and history of coronary evaluation via stress test in 2017 and a nuclear study in [facility] 3 years prior who presents with chest pain on exertion. Dyllan evening she was walking on level ground with her daughter and 6 minutes into the walk experienced pronounced chest pain. This pain was described as pressure located on the left side of the chest and radiating to both sides of her neck/jaw and down her left arm. Pain rated 9/10 in intensity and resolved after resting 2-3 minutes. She denies any sob, n/v, diaphoresis,orthopnea, PND, LOC, dizziness or other associated symptoms. She does state that she had experienced a similar episode in the past 2 weeks in which the pain was less severe and did not radiate, but was brought on by exertion and relieved with rest. She states that she has been taking all of her previous medications as prescribed and denies any current chest pain, SOB or other symptoms.            Review of Systems:      All other review of systems negative     Constitutional: Denies fevers, Denies weight changes  Eyes: Denies changes in vision, no eye pain  Ears/Nose/Throat/Mouth: Denies nasal congestion or sore throat   Cardiovascular: denies chest pain, palpitations   Respiratory: no SOB, Denies cough  Gastrointestinal/Hepatic: Denies abdominal pain, nausea, vomiting, diarrhea, constipation or GI bleeding   Genitourinary: Denies dysuria or frequency  Musculoskeletal/Rheum: Denies joint pain,  swelling and edema  Skin: Denies rash  Neurological: Denies confusion, memory loss or focal weakness/parasthesias  Psychiatric: Denies mood disorder   Endocrine: Denies thyroid problems               Past Medical History:    Hypertension        Hyperlipidemia        Gastritis        Diabetes (CMS-HCC)        Depression        Atopic dermatitis        Allergy                    Past Surgical History:  Past Surgical History  OTHER ORTHOPEDIC SURGERY   46 - 47y shoulder surgery     CHOLECYSTECTOMY   46 - 47y                Medications:  Acetaminophen 650mg Q6 PRN  ASA 81mg QD  Atorvastatin 40MG QD  Lisinopril 10MG QD  Metoprolol SR 25mg QD  Omeprazole 20mg QD  Meformin 500mg BID        Allergy:  Seafood- Hives  Beets - Hives      Family History:  Father  due to uncontrolled DM  Mother  due to Lung Ca, Hx of MI at age 68  Denies further family hx    Social History:  Former smoker of roughly 15 pack years, 1ppd x 15yrs.   Denies former or current alcohol use  Denies any other drug use    Physical Exam:  Vitals  Weight/BMI: 141 lbs, 64.4 kg  Blood pressure 114/63 , pulse 83 , temperature 36.4 C , Resp. Rate 15 , SpO2 95 % on RA .       Constitutional:  Well developed, Well nourished, No acute distress AOx4   HENMT:  Normocephalic, Atraumatic. Moist mucus membranes. Oropharynx clear non obstructed  No thyromegaly or tenderness.  Eyes:  EOMI, Conjunctiva normal, No discharge.  Neck:  Normal range of motion, No cervical tenderness,  no elevated JVD with Hepatojuglar reflex of 6-7 cm.  Cardiovascular: S1 S2 Normal heart rate, non tender, no thrills or heaves. Normal rhythm, No murmurs, No rubs, No gallops.  Lungs:  Normal breath sounds, breath sounds clear to auscultation bilaterally,  no crackles, no wheezing.   Abdomen: Bowel sounds normal, Soft, No tenderness, No guarding, No rebound, No masses, No hepatosplenomegaly.  Skin: Warm, Dry, No erythema, No rash, no induration no edema.  Neurologic:  Alert, No focal deficits noted, cranial nerves II through X are grossly intact.  Psychiatric: Affect normal, Judgment normal, Mood normal.  extremities pulse 2+ and equal in amplitude and character in bilateral radial and posterior tibial arteries.     Lab Data Review:  Results for TONY MOSER (MRN [id number]) as of 4/25/2018 14:07   Ref. Range 4/24/2018 01:42 4/24/2018 17:33 4/24/2018 19:26 4/25/2018 00:14 4/25/2018 00:15   WBC Latest Ref Range: 4.8 - 10.8 K/uL 8.3    7.7   RBC Latest Ref Range: 4.20 - 5.40 M/uL 5.05    4.60   Hemoglobin Latest Ref Range: 12.0 - 16.0 g/dL 14.6    13.2   Hematocrit Latest Ref Range: 37.0 - 47.0 % 43.9    40.4   MCV Latest Ref Range: 81.4 - 97.8 fL 86.9    87.8   MCH Latest Ref Range: 27.0 - 33.0 pg 28.9    28.7   MCHC Latest Ref Range: 33.6 - 35.0 g/dL 33.3 (L)    32.7 (L)   RDW Latest Ref Range: 35.9 - 50.0 fL 45.0    45.1   Platelet Count Latest Ref Range: 164 - 446 K/uL 226    221   MPV Latest Ref Range: 9.0 - 12.9 fL 10.5    10.4   Neutrophils-Polys Latest Ref Range: 44.00 - 72.00 % 53.30       Neutrophils (Absolute) Latest Ref Range: 2.00 - 7.15 K/uL 4.40       Lymphocytes Latest Ref Range: 22.00 - 41.00 % 36.90       Lymphs (Absolute) Latest Ref Range: 1.00 - 4.80 K/uL 3.05       Monocytes Latest Ref Range: 0.00 - 13.40 % 6.30       Monos (Absolute) Latest Ref Range: 0.00 - 0.85 K/uL 0.52       Eosinophils Latest Ref Range: 0.00 - 6.90 % 2.90       Eos (Absolute) Latest Ref Range: 0.00 - 0.51 K/uL 0.24       Basophils Latest Ref Range: 0.00 - 1.80 % 0.40       Baso (Absolute) Latest Ref Range: 0.00 - 0.12 K/uL 0.03       Immature Granulocytes Latest Ref Range: 0.00 - 0.90 % 0.20       Immature Granulocytes (abs) Latest Ref Range: 0.00 - 0.11 K/uL 0.02       Nucleated RBC Latest Units: /100 WBC 0.00       NRBC (Absolute) Latest Units: K/uL 0.00       Sodium Latest Ref Range: 135 - 145 mmol/L 135    137   Potassium Latest Ref Range: 3.6 - 5.5 mmol/L 3.9    3.7   Chloride Latest  Ref Range: 96 - 112 mmol/L 106    106   Co2 Latest Ref Range: 20 - 33 mmol/L 22    23   Anion Gap Latest Ref Range: 0.0 - 11.9  7.0    8.0   Glucose Latest Ref Range: 65 - 99 mg/dL 140 (H)    226 (H)   Bun Latest Ref Range: 8 - 22 mg/dL 19    15   Creatinine Latest Ref Range: 0.50 - 1.40 mg/dL 0.80    0.56   GFR If  Latest Ref Range: >60 mL/min/1.73 m 2 >60    >60   GFR If Non  Latest Ref Range: >60 mL/min/1.73 m 2 >60    >60   Calcium Latest Ref Range: 8.5 - 10.5 mg/dL 9.1    9.2   AST(SGOT) Latest Ref Range: 12 - 45 U/L 27    17   ALT(SGPT) Latest Ref Range: 2 - 50 U/L 36    23   Alkaline Phosphatase Latest Ref Range: 30 - 99 U/L 109 (H)    88   Total Bilirubin Latest Ref Range: 0.1 - 1.5 mg/dL 0.5    0.4   Albumin Latest Ref Range: 3.2 - 4.9 g/dL 4.7    3.8   Total Protein Latest Ref Range: 6.0 - 8.2 g/dL 8.1    6.6   Globulin Latest Ref Range: 1.9 - 3.5 g/dL 3.4    2.8   A-G Ratio Latest Units: g/dL 1.4    1.4   Lipase Latest Ref Range: 7 - 58 U/L 36       Glycohemoglobin Latest Units: %  6.6      Troponin I Latest Ref Range: 0.00 - 0.04 ng/mL <0.02    <0.01   B Natriuretic Peptide Latest Ref Range: 0 - 100 pg/mL 9       PT Latest Ref Range: 12.0 - 14.6 sec 12.7       INR Latest Ref Range: 0.87 - 1.13  0.96       APTT Latest Ref Range: 24.7 - 36.0 sec 32.1       DX-CHEST-LIMITED (1 VIEW) Unknown   Rpt     EKG (ER) Unknown    Rpt      EKG with normal sinus rhythm, low voltage.  Rate: 75  Rhythm: Sinus  Axis: Normal  P - appears wnl  MN - less than 200 msec  QRS - Narrow complex <120msec  QT interval - wnl, borderline   ST - No elevations or depressions  No ventricular hypertrophy and/or bundle branch blocks.   Borderline Q waves in inferior leads.     MDM (Assessment and Plan):         Active Hospital Problems     Diagnosis   • Chest pain   • HTN (hypertension) [I10]   • Dyslipidemia [E78.5]   • DM      Assessment and plan      1.Chest pain                  - Based on Ms. Price  history and physical exam findings, it appears she may have had clinical stable CAD based on undergoing a nuclear study in 2015 and stress test at [facility] 08/2017, not receiving any coronary stents, but being medically managed with ASA, Statin, Beta blocker, Acei, with good compliance per patient. The experience of this pain is markedly different from previous episodes being described as more severe and new findings of radiation to jaw and left arm. Physical exam is grossly unremarkable and laboratory studies display negative trended troponins, BNP of 9, and EKG without ANN or ischemic changes.                - Most likely to be Unstable angina without current symptoms, cardiac enzyme elevations or HD compromise. Continue medical mgmt and will plan to cath later this evening or tomorrow morning.   - Metoprolol SR 25 QD  - ASA 81mg   - Lisinopril 10mg QD  - Atorvastatin 40mg  - SubQ Heparin 5,000    2. HTN                           - BP controlled with Lisinopril 10mg QD and Metoprolol SR 25 QD.              - Denies dizziness, lightheadedness, cough, swelling or other related complaints.               - No changes at this time       3. DM             - during hospital stay will regulate glucose with pharmacy insulin sliding scale protocol.      Dru Daniel MS4  Attending addendum/additions to follow.

## 2018-04-25 NOTE — ED PROVIDER NOTES
"ED Provider Note    CHIEF COMPLAINT  Chief Complaint   Patient presents with   • Sent by MD   • Chest Pain       HPI  Shahida Price is a 57 y.o. female who presents complaining of chest pain. The patient was fine until Sunday she started having some chest discomfort. She notes it when she was walking/exerting herself, she had a pain in her chest which is a pressure burning sensation which radiates to her jaw and her left upper extremity. This went away after resting. Whenever she walked fast, she had a recurrence of the symptoms. Prior to that she has not had this before. She saw her doctor today for routine follow-up, and upon hearing that, her physician referred her to cardiology with instructions that should she have further episodes she is to come to the ER. She has had \"pinching\" intermittently today, however not as bad as the weekend. She presents here for workup. She's had no recent trips or travel. No cough or cold symptoms. No associated shortness of breath. No leg pain or swelling. There is no other complaint.    PAST MEDICAL HISTORY  Past Medical History:   Diagnosis Date   • Allergy    • Atopic dermatitis    • Depression    • Diabetes (CMS-HCC)    • Gastritis    • Hyperlipidemia    • Hypertension        FAMILY HISTORY  Family History   Problem Relation Age of Onset   • Cancer Mother      lung cancer, chronic smoker   • Lung Disease Mother    • Heart Attack Mother    • Diabetes Father        SOCIAL HISTORY  Social History   Substance Use Topics   • Smoking status: Former Smoker     Packs/day: 1.00     Years: 15.00     Quit date: 4/17/2002   • Smokeless tobacco: Never Used   • Alcohol use No      Comment: 1 beer / month         SURGICAL HISTORY  Past Surgical History:   Procedure Laterality Date   • CHOLECYSTECTOMY  2007   • OTHER ORTHOPEDIC SURGERY  2007    shoulder surgery       CURRENT MEDICATIONS    I have reviewed the nurses notes and/or the list brought with the patient.    ALLERGIES  Allergies " "  Allergen Reactions   • Shellfish Allergy Hives and Rash     Shellfish       REVIEW OF SYSTEMS  See HPI for further details. Review of systems as above, otherwise all other systems are negative.     PHYSICAL EXAM  VITAL SIGNS: /83   Pulse 68   Temp 36.9 °C (98.5 °F)   Resp 20   Ht 1.549 m (5' 1\")   Wt 64.4 kg (142 lb)   SpO2 94%   BMI 26.83 kg/m²   Constitutional: Well appearing patient in no acute distress.  Not toxic, nor ill in appearance.  HENT: Mucus membranes moist.  Oropharynx is clear.  Eyes: Pupils equally round.  No scleral icterus.   Neck: Full nontender range of motion.  Lymphatic: No cervical lymphadenopathy noted.   Cardiovascular: Regular heart rate and rhythm.  No murmurs, rubs, nor gallop appreciated.   Thorax & Lungs: Chest is nontender.  Lungs are clear to auscultation with good air movement bilaterally.  No wheeze, rhonchi, nor rales.   Abdomen: Soft, with no tenderness, rebound nor guarding.  No mass, pulsatile mass, nor hepatosplenomegaly appreciated.  Skin: No purpura nor petechia noted.  Extremities/Musculoskeletal: No sign of trauma.  Calves are nontender with no cords nor edema.  No Juarez's sign.  Pulses are intact all around.   Neurologic: Alert & oriented.  Strength and sensation is intact all around.  Gait is normal.  Psychiatric: Normal affect appropriate for the clinical situation.    EKG  I interpreted this EKG myself.  This is a 12-lead study.  The rhythm is sinus with a normal rate. There are no ST segment nor T wave abnormalities. Low voltages in the inferior leads, however. She has Q waves in III and F. Interpretation: No ST segment elevation myocardial infarction..    LABS  Labs Reviewed   CBC WITH DIFFERENTIAL - Abnormal; Notable for the following:        Result Value    MCHC 33.3 (*)     All other components within normal limits    Narrative:     Indicate which anticoagulants the patient is on:->UNKNOWN   COMP METABOLIC PANEL - Abnormal; Notable for the following: "     Glucose 140 (*)     Alkaline Phosphatase 109 (*)     All other components within normal limits    Narrative:     Indicate which anticoagulants the patient is on:->UNKNOWN   TROPONIN    Narrative:     Indicate which anticoagulants the patient is on:->UNKNOWN   BTYPE NATRIURETIC PEPTIDE    Narrative:     Indicate which anticoagulants the patient is on:->UNKNOWN   PROTHROMBIN TIME    Narrative:     Indicate which anticoagulants the patient is on:->UNKNOWN   APTT    Narrative:     Indicate which anticoagulants the patient is on:->UNKNOWN   LIPASE    Narrative:     Indicate which anticoagulants the patient is on:->UNKNOWN   ESTIMATED GFR    Narrative:     Indicate which anticoagulants the patient is on:->UNKNOWN         RADIOLOGY/PROCEDURES  I have reviewed the patient's film interpretations myself, and they are read out by the radiologist as:   DX-CHEST-LIMITED (1 VIEW)   Final Result      1.  Mild bibasilar atelectasis.   2.  No pneumonia or pneumothorax.        .    MEDICAL RECORD  I have reviewed patient's medical record and pertinent results are listed above.    COURSE & MEDICAL DECISION MAKING  I have reviewed any medical record information, laboratory studies and radiographic results as noted above.  This is a patient with multiple cardiovascular risk factors who presents with chest discomfort. Her physician was worried about unstable angina, I referred her here for further workup given her ongoing pain. Her 1st troponin is negative, her EKG is nondiagnostic. Chest x-ray shows no evidence of infiltrate, pneumothorax. She was given aspirin. Presently she is pain-free. At this point case is discussed with Dr. Lee, the [facility]. She is admitted.    Addendum: The patient had a negative stress test back in August. I spoke with Dr. King, cardiology. Given this and her ongoing exertional symptoms, he suspects he will go straight to cardiac cath to delineate her anatomy. For this reason we will transfer her  downtown. This was discussed with the patient her daughter and they verbalized agreement.    FINAL IMPRESSION  1. Precordial chest pain    2. History of hypertension       This dictation was created using voice recognition software.    Electronically signed by: Bud Rasmussen, 4/24/2018 8:44 PM

## 2018-04-26 VITALS
HEIGHT: 61 IN | TEMPERATURE: 97.3 F | HEART RATE: 67 BPM | RESPIRATION RATE: 19 BRPM | DIASTOLIC BLOOD PRESSURE: 55 MMHG | OXYGEN SATURATION: 96 % | SYSTOLIC BLOOD PRESSURE: 119 MMHG | WEIGHT: 141.98 LBS | BODY MASS INDEX: 26.81 KG/M2

## 2018-04-26 LAB
ANION GAP SERPL CALC-SCNC: 6 MMOL/L (ref 0–11.9)
BUN SERPL-MCNC: 12 MG/DL (ref 8–22)
CALCIUM SERPL-MCNC: 8.8 MG/DL (ref 8.5–10.5)
CHLORIDE SERPL-SCNC: 111 MMOL/L (ref 96–112)
CO2 SERPL-SCNC: 23 MMOL/L (ref 20–33)
CREAT SERPL-MCNC: 0.62 MG/DL (ref 0.5–1.4)
GLUCOSE BLD-MCNC: 120 MG/DL (ref 65–99)
GLUCOSE SERPL-MCNC: 117 MG/DL (ref 65–99)
POTASSIUM SERPL-SCNC: 3.9 MMOL/L (ref 3.6–5.5)
SODIUM SERPL-SCNC: 140 MMOL/L (ref 135–145)

## 2018-04-26 PROCEDURE — G8980 MOBILITY D/C STATUS: HCPCS | Mod: CI

## 2018-04-26 PROCEDURE — A9270 NON-COVERED ITEM OR SERVICE: HCPCS | Performed by: NURSE PRACTITIONER

## 2018-04-26 PROCEDURE — 700111 HCHG RX REV CODE 636 W/ 250 OVERRIDE (IP): Performed by: INTERNAL MEDICINE

## 2018-04-26 PROCEDURE — A9270 NON-COVERED ITEM OR SERVICE: HCPCS | Performed by: INTERNAL MEDICINE

## 2018-04-26 PROCEDURE — G8979 MOBILITY GOAL STATUS: HCPCS | Mod: CI

## 2018-04-26 PROCEDURE — G8978 MOBILITY CURRENT STATUS: HCPCS | Mod: CI

## 2018-04-26 PROCEDURE — 700102 HCHG RX REV CODE 250 W/ 637 OVERRIDE(OP): Performed by: INTERNAL MEDICINE

## 2018-04-26 PROCEDURE — 99217 PR OBSERVATION CARE DISCHARGE: CPT | Performed by: HOSPITALIST

## 2018-04-26 PROCEDURE — 97161 PT EVAL LOW COMPLEX 20 MIN: CPT

## 2018-04-26 PROCEDURE — 80048 BASIC METABOLIC PNL TOTAL CA: CPT

## 2018-04-26 PROCEDURE — 82962 GLUCOSE BLOOD TEST: CPT

## 2018-04-26 PROCEDURE — G0378 HOSPITAL OBSERVATION PER HR: HCPCS

## 2018-04-26 PROCEDURE — 700102 HCHG RX REV CODE 250 W/ 637 OVERRIDE(OP): Performed by: NURSE PRACTITIONER

## 2018-04-26 PROCEDURE — 96372 THER/PROPH/DIAG INJ SC/IM: CPT

## 2018-04-26 RX ORDER — KETOROLAC TROMETHAMINE 10 MG/1
10 TABLET, FILM COATED ORAL EVERY 6 HOURS PRN
Qty: 20 TAB | Refills: 0 | Status: SHIPPED | OUTPATIENT
Start: 2018-04-26 | End: 2018-05-01

## 2018-04-26 RX ADMIN — METOPROLOL SUCCINATE 25 MG: 25 TABLET, EXTENDED RELEASE ORAL at 07:53

## 2018-04-26 RX ADMIN — HEPARIN SODIUM 5000 UNITS: 5000 INJECTION, SOLUTION INTRAVENOUS; SUBCUTANEOUS at 06:19

## 2018-04-26 RX ADMIN — OMEPRAZOLE 20 MG: 20 CAPSULE, DELAYED RELEASE ORAL at 07:52

## 2018-04-26 RX ADMIN — LISINOPRIL 10 MG: 10 TABLET ORAL at 07:52

## 2018-04-26 RX ADMIN — ACETAMINOPHEN 650 MG: 325 TABLET, FILM COATED ORAL at 06:20

## 2018-04-26 ASSESSMENT — COGNITIVE AND FUNCTIONAL STATUS - GENERAL
SUGGESTED CMS G CODE MODIFIER MOBILITY: CH
MOBILITY SCORE: 24

## 2018-04-26 ASSESSMENT — GAIT ASSESSMENTS
GAIT LEVEL OF ASSIST: INDEPENDENT
DISTANCE (FEET): 50

## 2018-04-26 ASSESSMENT — PAIN SCALES - GENERAL
PAINLEVEL_OUTOF10: 2
PAINLEVEL_OUTOF10: 6

## 2018-04-26 NOTE — PROGRESS NOTES
[facility]    Date of Service: 2018    Chief Complaint  57 y.o. female admitted 2018 with chest pain    Interval Problem Update  Sent to cath lab. Clean coronaries. Femoral access. Needs to be flat for 4-6 hours.    Consultants/Specialty  Cardiology    Disposition  Home        Review of Systems   Constitutional: Negative for chills, diaphoresis, fever and weight loss.   HENT: Negative for ear pain, sore throat and tinnitus.    Eyes: Negative for blurred vision, double vision and photophobia.   Respiratory: Negative for cough, hemoptysis, shortness of breath, wheezing and stridor.    Cardiovascular: Positive for chest pain. Negative for palpitations, orthopnea and PND.   Gastrointestinal: Negative for abdominal pain, blood in stool, diarrhea, heartburn, melena, nausea and vomiting.   Genitourinary: Negative for dysuria, hematuria and urgency.   Musculoskeletal: Positive for back pain and neck pain. Negative for joint pain and myalgias.        Positive for left arm  pain   Neurological: Positive for tingling. Negative for dizziness, sensory change, focal weakness, seizures and headaches.   Endo/Heme/Allergies: Does not bruise/bleed easily.      Physical Exam  Laboratory/Imaging   Hemodynamics  Temp (24hrs), Av.6 °C (97.9 °F), Min:36.4 °C (97.6 °F), Max:36.8 °C (98.2 °F)   Temperature: 36.8 °C (98.2 °F)  Pulse  Av.6  Min: 67  Max: 83    Blood Pressure: 139/65      Respiratory      Respiration: 15, Pulse Oximetry: 95 %             Fluids  No intake or output data in the 24 hours ending 18 1731    Nutrition  Orders Placed This Encounter   Procedures   • Diet Order     Standing Status:   Standing     Number of Occurrences:   1     Order Specific Question:   Diet:     Answer:   Cardiac [6]     Physical Exam   Constitutional: She is oriented to person, place, and time. She appears well-developed.   HENT:   Head: Normocephalic and atraumatic.   Eyes: EOM are normal. Pupils are  equal, round, and reactive to light.   Neck: Neck supple.   Cardiovascular: Normal rate and regular rhythm.  Exam reveals no gallop.    No murmur heard.  Pulmonary/Chest: Breath sounds normal.   Abdominal: Soft. She exhibits no distension. There is no tenderness.   Neurological: She is alert and oriented to person, place, and time. No cranial nerve deficit.   Skin: Skin is warm and dry.       Recent Labs      04/24/18   0142  04/25/18   0015   WBC  8.3  7.7   RBC  5.05  4.60   HEMOGLOBIN  14.6  13.2   HEMATOCRIT  43.9  40.4   MCV  86.9  87.8   MCH  28.9  28.7   MCHC  33.3*  32.7*   RDW  45.0  45.1   PLATELETCT  226  221   MPV  10.5  10.4     Recent Labs      04/24/18 0142  04/25/18   0015   SODIUM  135  137   POTASSIUM  3.9  3.7   CHLORIDE  106  106   CO2  22  23   GLUCOSE  140*  226*   BUN  19  15   CREATININE  0.80  0.56   CALCIUM  9.1  9.2     Recent Labs      04/24/18 0142   APTT  32.1   INR  0.96     Recent Labs      04/24/18   0142   BNPBTYPENAT  9              Assessment/Plan     Chest pain- (present on admission)   Assessment & Plan    Trops neg  Echo w preserved EF  Grd I diastolic dysfunction  Taken to cath - clean coronaries.  Femoral access - has to stay flat x 4-6 hours  Consider GI vs chest wall/cervical contribution. C/O parasthesias left arm/hand at times        Essential hypertension- (present on admission)   Assessment & Plan    Well controlled.  Cont current meds and monitor        History of gastritis- (present on admission)   Assessment & Plan    Trial PPI        Dyslipidemia- (present on admission)   Assessment & Plan    Cont statin          Type 2 diabetes mellitus (HCC)- (present on admission)   Assessment & Plan    A1C 6.6  Cont SSI Q6  On ACEI, Statin, ASA          Quality-Core Measures   Reviewed items::  EKG reviewed, Labs reviewed, Medications reviewed and Radiology images reviewed  Hagen catheter::  No Hagen  DVT prophylaxis pharmacological::  Heparin

## 2018-04-26 NOTE — ASSESSMENT & PLAN NOTE
Trops neg  Echo w preserved EF  Grd I diastolic dysfunction  Taken to cath - clean coronaries.  Femoral access - has to stay flat x 4-6 hours  Consider GI vs chest wall/cervical contribution. C/O parasthesias left arm/hand at times

## 2018-04-26 NOTE — DISCHARGE SUMMARY
CHIEF COMPLAINT ON ADMISSION  No chief complaint on file.      CODE STATUS  Full Code    HPI & HOSPITAL COURSE  This is a 57 y.o. female here with chest pain. She originally presented April 24, 2018 to the emergency room at [facility]. Please see the emergency room dictation from Dr. Bud Rasmussen M.D. for complete details. In short, the patient complained of chest pain related to exertion and relieved with rest on multiple occasions. Pain radiated to the left upper extremity jaw with some intermittent paresthesias. She has history of hyperlipidemia, hypertension, and diabetes. Due to suspicion for acute coronary syndrome, the patient was transferred to [facility] for further evaluation. Initial troponins were normal. EKG was negative for acute ST segment or T-wave morphology. Echo showed normal left ventricular systolic function, with ejection fraction at 70%. The patient did show grade 1 diastolic dysfunction. Of note, nuclear medicine stress testing from August 2017 was negative for ischemia. Chest x-ray performed in the ER was negative for acute pathology. Ultimately, due to persistent symptoms, the patient was taken to the interventional suite for coronary angiography. She was found to have clean coronaries. Due to femoral artery access, the patient was required to be supine for 4 hours. A1C is under excellent control at 6.6. She currently takes 81mg of ASA, Lipitor, ACEI.    The patient recovered much more quickly than anticipated on admission.    Therefore, she is discharged in good and stable condition with close outpatient follow-up.    SPECIFIC OUTPATIENT FOLLOW-UP  Cardiology    DISCHARGE PROBLEM LIST  Active Problems:    Chest pain POA: Yes    Essential hypertension POA: Yes    Type 2 diabetes mellitus (HCC) POA: Yes    Dyslipidemia POA: Yes  Resolved Problems:    * No resolved hospital problems. *      FOLLOW UP  Future Appointments  Date Time Provider Department Center    5/9/2018 4:50 PM Keon Talamantes M.D. [facility]     No follow-up provider specified.    MEDICATIONS ON DISCHARGE   Shahida Price   Home Medication Instructions TASIA:[id number]    Printed on:04/25/18 9711   Medication Information                      albuterol 108 (90 BASE) MCG/ACT Aero Soln inhalation aerosol  Inhale 2 Puffs by mouth every 6 hours as needed for Shortness of Breath.             aspirin (ASA) 81 MG Chew Tab chewable tablet  Take 1 Tab by mouth every day.             atorvastatin (LIPITOR) 20 MG Tab  Take 1 Tab by mouth every day.             betamethasone dipropionate (DIPROLENE) 0.05 % Cream  Apply 1 Application to affected area(s) 2 times a day. Apply to wrist             ibuprofen (MOTRIN) 200 MG Tab  Take 400 mg by mouth at bedtime as needed (PAIN).             JARDIANCE 10 MG Tab  TAKE 10 MG BY MOUTH EVERY DAY.             lisinopril (PRINIVIL) 10 MG Tab  TAKE 1 TAB BY MOUTH EVERY DAY.             metformin (GLUCOPHAGE) 500 MG Tab  Take 1 Tab by mouth 2 times a day, with meals.             omeprazole (PRILOSEC) 20 MG Tablet Delayed Response delayed-release tablet  Take 1 Tab by mouth every day.             vitamin D, Ergocalciferol, (DRISDOL) 37718 units Cap capsule  Take 1 Cap by mouth every 28 days.                 DIET  Orders Placed This Encounter   Procedures   • DIET ORDER     Standing Status:   Standing     Number of Occurrences:   1     Order Specific Question:   Diet:     Answer:   Diabetic [3]       ACTIVITY  As tolerated.  Weight bearing as tolerated      CONSULTATIONS  Cardiology    PROCEDURES  Cardiac angiography    LABORATORY  Lab Results   Component Value Date/Time    SODIUM 137 04/25/2018 12:15 AM    POTASSIUM 3.7 04/25/2018 12:15 AM    CHLORIDE 106 04/25/2018 12:15 AM    CO2 23 04/25/2018 12:15 AM    GLUCOSE 226 (H) 04/25/2018 12:15 AM    BUN 15 04/25/2018 12:15 AM    CREATININE 0.56 04/25/2018 12:15 AM        Lab Results   Component Value Date/Time    WBC 7.7 04/25/2018 12:15  AM    HEMOGLOBIN 13.2 04/25/2018 12:15 AM    HEMATOCRIT 40.4 04/25/2018 12:15 AM    PLATELETCT 221 04/25/2018 12:15 AM        Total time of the discharge process exceeds 36 minutes

## 2018-04-26 NOTE — PROGRESS NOTES
Full assessment completed, Pt A+Ox4, denies any chest pain or other discomfort. SR, BP stable. Continues on bedrest with HOB <30 post cath procedure, pt tolerating well. Call light within reach.

## 2018-04-26 NOTE — PROCEDURES
DATE OF SERVICE:  04/25/2018    PROCEDURES:  1.  Selective coronary angiography.  2.  Left heart catheterization.  3.  Left ventriculography.  4.  Supervision of conscious sedation.    INDICATIONS:  The patient is a 57-year-old diabetic with a history of chest   pain suggestive of angina.  She is undergoing coronary angiography at this   time to determine if she has underlying coronary artery disease.    DESCRIPTION OF PROCEDURE:  The right groin was sterilely prepped and draped in   the usual fashion.  The patient was premedicated with 1.5 mg of Versed and 50   mcg of fentanyl.  The right femoral head was visualized under fluoroscopy.    The right femoral artery was also visualized with SonoSite.  The area over the   femoral artery was anesthetized with 2% lidocaine.  The artery was then   easily entered.  A 4-[place] sheath was placed.  Next, a 4-[place] left Mike   catheter was placed and advanced into the ostium of the left main coronary   artery where selective angiograms were performed.  This catheter was exchanged   over a wire for a 3D right coronary catheter, which was advanced into the   ostium of the right coronary artery where selective angiograms were performed.    Following this, a pigtail catheter was exchanged and a left heart   catheterization was performed.  Following this, a left ventriculogram was   performed using 24 mL of contrast over 3 seconds.  A left heart pullback was   performed.  The catheter was removed.  The sheath was then removed and   hemostasis was obtained by direct compression of the artery.  There were no   complications.    ESTIMATED BLOOD LOSS:  Less than 10 mL.    FLUOROSCOPY TIME:  1.6 minutes.    X-RAY DOSE-AREA PRODUCT:  4555.    TOTAL CONTRAST MEDIA:  83 mL of Omnipaque 350.  Conscious sedation supervision   start time at 4:55 p.m.  Conscious sedation conclusion 5:12 p.m.    HEMODYNAMICS:  Reveals sinus rhythm throughout the procedure.  Aortic pressure   is 134/60 mmHg.   LV pressure 152/16 mmHg.  There is no gradient on LV   pullback.    Fluoroscopy of the heart is unremarkable.    The left main coronary artery is free of disease and trifurcates into a very   small ramus intermedius, the LAD, and the circumflex.  Ramus intermedius is a   millimeter or less in diameter and unremarkable.  The LAD terminates at the LV   apex and gives rise to a large caliber first diagonal artery.  A small   diagonal artery also arises mid LAD.  The LAD system is free of disease.    The circumflex is also free of disease.  This also gives rise to an atrial   branch and then a moderate-sized first marginal artery.  The circumflex   terminates in a large caliber second marginal artery.    The right coronary artery is also free of disease.  This vessel is a dominant   vessel giving rise to a large posterior descending artery and an unremarkable   moderate-sized posterior left ventricular artery.  An RV free wall branch   arises mid way between the origin and the acute margin.  The left   ventriculogram demonstrates normal segmental wall motion.  There is no mitral   insufficiency and the aortic root is unremarkable.  Calculated ejection   fraction of 75%.    IMPRESSION:  1.  Normal resting hemodynamics.  2.  Normal coronary angiography.  3.  Normal left ventricular ejection fraction of 75%.       ____________________________________     MD AYAAN MCKEON / JAMIE    DD:  04/25/2018 17:30:15  DT:  04/25/2018 17:48:27    D#:  [id number]  Job#:  [id number]

## 2018-04-26 NOTE — CATH LAB
Immediate Post-Operative Note      PreOp Diagnosis: Chest pain     PostOp Diagnosis: same    Procedure(s) :  Coronary Angiography, Left Heart Catheterization, Left Ventriculography.  4F catheters RFA    Surgeon(s):  Gerardo Man M.D.    Type of Anesthesia: Moderate Sedation    Specimen: None    Estimated Blood Loss: <10 cc's    Contrast Media:  83 cc's    Fluoro Time: 1.6 min    Xray DAP: 4555    Findings: Normal coronary arteries.  EF 75%     Complications: none      Gerardo Man M.D.  4/25/2018 5:12 PM

## 2018-04-26 NOTE — PROGRESS NOTES
Pt c/o tenderness to right groin, soft to palpation no evidence of hematoma, continue to monitor. Declined tylenol for relief. AM labs drawn and sent.

## 2018-04-26 NOTE — PROGRESS NOTES
Pt dc'd home. IV and monitor removed. Pt left unit via wheelchair with RN, Transported home by friend. Personal belongings with pt when leaving unit. Pt given discharge instructions prior to leaving unit including prescription and when to visit with physician; verbalizes understanding. Copy of discharge instructions with pt and in the chart.  Discharge instruction translated in Malagasy by NANI Albert.

## 2018-04-26 NOTE — DISCHARGE INSTRUCTIONS
Discharge Instructions    Discharged to home by car with relative. Discharged via wheelchair, hospital escort: Yes.  Special equipment needed: Not Applicable    Be sure to schedule a follow-up appointment with your primary care doctor or any specialists as instructed.     Discharge Plan:   Diet Plan: Discussed  Activity Level: Discussed  Confirmed Follow up Appointment: Patient to Call and Schedule Appointment  Medication Reconciliation Updated: Yes  Influenza Vaccine Indication: Not indicated: Previously immunized this influenza season and > 8 years of age    I understand that a diet low in cholesterol, fat, and sodium is recommended for good health. Unless I have been given specific instructions below for another diet, I accept this instruction as my diet prescription.   Other diet: Heart Healthy     Special Instructions: None    · Is patient discharged on Warfarin / Coumadin?   No     Depression / Suicide Risk    As you are discharged from this [facility] facility, it is important to learn how to keep safe from harming yourself.    Recognize the warning signs:  · Abrupt changes in personality, positive or negative- including increase in energy   · Giving away possessions  · Change in eating patterns- significant weight changes-  positive or negative  · Change in sleeping patterns- unable to sleep or sleeping all the time   · Unwillingness or inability to communicate  · Depression  · Unusual sadness, discouragement and loneliness  · Talk of wanting to die  · Neglect of personal appearance   · Rebelliousness- reckless behavior  · Withdrawal from people/activities they love  · Confusion- inability to concentrate     If you or a loved one observes any of these behaviors or has concerns about self-harm, here's what you can do:  · Talk about it- your feelings and reasons for harming yourself  · Remove any means that you might use to hurt yourself (examples: pills, rope, extension cords, firearm)  · Get professional  help from the community (Mental Health, Substance Abuse, psychological counseling)  · Do not be alone:Call your Safe Contact- someone whom you trust who will be there for you.  · Call your local CRISIS HOTLINE 226-8590 or 979-206-6420  · Call your local Children's Mobile Crisis Response Team [place] (273) 635-3022 or www.[employer]  · Call the toll free National Suicide Prevention Hotlines   · National Suicide Prevention Lifeline 101-581-LSWD (2625)  · National Hope Line Network 800-SUICIDE (873-8809)          Femoral Site Care  Introduction  Refer to this sheet in the next few weeks. These instructions provide you with information about caring for yourself after your procedure. Your health care provider may also give you more specific instructions. Your treatment has been planned according to current medical practices, but problems sometimes occur. Call your health care provider if you have any problems or questions after your procedure.  What can I expect after the procedure?  After your procedure, it is typical to have the following:  · Bruising at the site that usually fades within 1-2 weeks.  · Blood collecting in the tissue (hematoma) that may be painful to the touch. It should usually decrease in size and tenderness within 1-2 weeks.  Follow these instructions at home:  · Take medicines only as directed by your health care provider.  · You may shower 24-48 hours after the procedure or as directed by your health care provider. Remove the bandage (dressing) and gently wash the site with plain soap and water. Pat the area dry with a clean towel. Do not rub the site, because this may cause bleeding.  · Do not take baths, swim, or use a hot tub until your health care provider approves.  · Check your insertion site every day for redness, swelling, or drainage.  · Do not apply powder or lotion to the site.  · Limit use of stairs to twice a day for the first 2-3 days or as directed by your health care  provider.  · Do not squat for the first 2-3 days or as directed by your health care provider.  · Do not lift over 10 lb (4.5 kg) for 5 days after your procedure or as directed by your health care provider.  · Ask your health care provider when it is okay to:  ¨ Return to work or school.  ¨ Resume usual physical activities or sports.  ¨ Resume sexual activity.  · Do not drive home if you are discharged the same day as the procedure. Have someone else drive you.  · You may drive 24 hours after the procedure unless otherwise instructed by your health care provider.  · Do not operate machinery or power tools for 24 hours after the procedure or as directed by your health care provider.  · If your procedure was done as an outpatient procedure, which means that you went home the same day as your procedure, a responsible adult should be with you for the first 24 hours after you arrive home.  · Keep all follow-up visits as directed by your health care provider. This is important.  Contact a health care provider if:  · You have a fever.  · You have chills.  · You have increased bleeding from the site. Hold pressure on the site.  Get help right away if:  · You have unusual pain at the site.  · You have redness, warmth, or swelling at the site.  · You have drainage (other than a small amount of blood on the dressing) from the site.  · The site is bleeding, and the bleeding does not stop after 30 minutes of holding steady pressure on the site.  · Your leg or foot becomes pale, cool, tingly, or numb.  This information is not intended to replace advice given to you by your health care provider. Make sure you discuss any questions you have with your health care provider.  Document Released: 08/21/2015 Document Revised: 05/25/2017 Document Reviewed: 07/07/2015  © 2017 Elsevier        Cuidado del sitio femoral  (Femoral Site Care)  Siga estas instrucciones servando las próximas semanas. Estas indicaciones le proporcionan información  acerca de cómo deberá cuidarse después del procedimiento. El médico también podrá darle instrucciones más específicas. El tratamiento springer sido planificado según las prácticas médicas actuales, nael en algunos casos pueden ocurrir problemas. Comuníquese con el médico si tiene algún problema o tiene dudas después del procedimiento.  QUÉ ESPERAR DESPUÉS DEL PROCEDIMIENTO  Después del procedimiento, es normal tener lo siguiente:  · Hematomas en el sitio que suelen desaparecer en el término de 1 o 2 semanas.  · Acumulación de robert en el tejido (hematoma) que puede ser dolorosa al tacto. Generalmente, en 1 o 2 semanas deben disminuir christie tamaño y el dolor a la palpación.  INSTRUCCIONES PARA EL CUIDADO EN EL HOGAR  · [place] los medicamentos solamente juan se lo haya indicado el médico.  · Puede ducharse 24 a 48 horas después del procedimiento o juan se lo haya indicado el médico. Retire el vendaje (apósito) y limpie suavemente el lugar de la inserción con agua y jabón común. Seque samara el área con eleuterio toalla limpia dando golpecitos. No frote el lugar, ya que puede sangrar.  · No tome balwinder de inmersión, no nade ni use el jacuzzi hasta que el médico lo autorice.  · Revise diariamente el lugar de la inserción para darby si aparece enrojecimiento, hinchazón o secreción.  · No se aplique talcos ni lociones en el lugar.  · Limite el uso de las escaleras a dos veces por día servando los primeros 2 o 3 días o juan se lo haya indicado el médico.  · No se ponga en cuclillas servando los primeros 2 o 3 días o juan se lo haya indicado el médico.  · No levante objetos que pesen más de 10 libras (4,5 kg) servando los 5 días posteriores al procedimiento o juan se lo haya indicado el médico.  · Pregúntele al médico cuándo puede hacer lo siguiente:  ¨ Regresar a la escuela o al trabajo.  ¨ Reanudar las actividades físicas o los deportes que practica habitualmente.  ¨ Reanudar la actividad sexual.  · No conduzca el automóvil por laura propios  medios si le dan el kelly el mismo día del procedimiento. Pídale a otra persona que lo lleve.  · Puede conducir 24 horas después del procedimiento, a menos que el médico le haya indicado lo contrario.  · No opere maquinaria ni herramientas eléctricas servando 24 horas después del procedimiento o juan lo haya indicado el médico.  · Si el procedimiento se hizo de manera ambulatoria, lo que significa que regresó a christie casa el mismo día de christie realización, un adulto responsable debe acompañarlo servando las primeras 24 horas.  · Concurra a todas las visitas de control juan se lo haya indicado el médico. Petronila es importante.  SOLICITE ATENCIÓN MÉDICA SI:  · Tiene fiebre.  · Tiene escalofríos.  · Aumenta el sangrado en el sitio. John presión en el lugar.  SOLICITE ATENCIÓN MÉDICA DE INMEDIATO SI:  · Tiene un dolor que no es habitual en el sitio.  · Observa que el sitio está enrojecida, caliente o hinchada.  · Tiene secreción (que no es eleuterio pequeña cantidad de robert en el vendaje) en el sitio.  · El sitio está sangrando, y el sangrado no se detiene después de 30 minutos de ejercer eleuterio presión param.  · La pierna o el pie se le ponen pálidos, fríos o siente hormigueo o adormecimiento.  Esta información no tiene juan fin reemplazar el consejo del médico. Asegúrese de hacerle al médico cualquier pregunta que tenga.  Document Released: 10/02/2015 Document Revised: 04/10/2017 Document Reviewed: 07/07/2015  Elsevier Interactive Patient Education © 2017 Elsevier Inc.

## 2018-04-26 NOTE — PROGRESS NOTES
Fem cath site has remained stable, no hematoma,strong pedal pulses. Requesting medication for sleep, given PRN per MAR.

## 2018-04-26 NOTE — PROGRESS NOTES
Report received from Paty. Right groin cath site observed, CDI without hematoma, pulses strong, RLE warm.

## 2018-04-26 NOTE — PROGRESS NOTES
Right groin site CDI, No sings of bleeding, or hematoma. Tolerating liquids without nausea or vomiting. Diet ordered.

## 2018-04-26 NOTE — PROGRESS NOTES
Pt returned from cath lab. Right groing site CDI, soft to touch; Pulses present. Pt denies pain;  VSS; Post op vital initiated. Discussed bedrest requirements; Pt verbalized understanding; Clear liquids provided; Will monitor for toleration; Translation communicated by NANI Albert in Tajik.

## 2018-04-26 NOTE — PROGRESS NOTES
Bedside report received 0700. POC discussed with pt; Right groin cath site CDI; No bleeding or hematoma; Pulses present; RLE warm to touch; Pt denies nausea, pain and dizziness at this time; Pending discharge; all questions answered at this time.

## 2018-04-27 NOTE — DISCHARGE SUMMARY
Hospital Medicine Discharge Note     Admit Date:  4/25/2018       Discharge Date:   4/26/2018    Attending Physician:  Eloy Wells M.D.      Diagnoses (includes active and resolved):     Active Problems:    Chest pain POA: Yes, resolved.     Essential hypertension POA: Yes, controlled    Type 2 diabetes mellitus (HCC) POA: Yes    Dyslipidemia POA: Yes    History of gastritis POA: Yes      Hospital Summary (Brief Narrative):         87-year-old female history diabetes, hypertension, dyslipidemia admitted with symptoms of chest pain.. She had heart catheterization revealing normal coronaries.  Suspected musculoskeletal or inflammatory cause.  On reevaluation her cardiac and neurologic exam was unremarkable and she had 2+ symmetric radial pulses. She had no  further chest pain although was tender over her chest wall, shoulder region.  She was discharged home with a trial of Toradol.      Consultants:        Dr. Sonido Sanchez    Imaging/ Testing:      No orders to display         Procedures:                  PreOp Diagnosis: Chest pain      PostOp Diagnosis: same     Procedure(s) :  Coronary Angiography, Left Heart Catheterization, Left Ventriculography.  4F catheters RFA     Surgeon(s):  Gerardo Man M.D.     Type of Anesthesia: Moderate Sedation     Specimen: None     Estimated Blood Loss: <10 cc's     Contrast Media:  83 cc's     Fluoro Time: 1.6 min     Xray DAP: 4555     Findings: Normal coronary arteries.  EF 75%      Complications: none        Gerardo Man M.D.  4/25/2018 5:12 PM       Discharge Medications:             Medication List      START taking these medications      Instructions   ketorolac 10 MG Tabs  Commonly known as:  TORADOL   Take 1 Tab by mouth every 6 hours as needed for Mild Pain or Moderate Pain for up to 5 days.  Dose:  10 mg        CONTINUE taking these medications      Instructions   albuterol 108 (90 Base) MCG/ACT Aers inhalation aerosol   Inhale 2 Puffs by mouth every 6 hours as  needed for Shortness of Breath.  Dose:  2 Puff     aspirin 81 MG Chew chewable tablet  Commonly known as:  ASA   Take 1 Tab by mouth every day.  Dose:  81 mg     atorvastatin 20 MG Tabs  Commonly known as:  LIPITOR   Take 1 Tab by mouth every day.  Dose:  20 mg     betamethasone dipropionate 0.05 % Crea  Commonly known as:  DIPROLENE   Apply 1 Application to affected area(s) 2 times a day. Apply to wrist  Dose:  1 Application     JARDIANCE 10 MG Tabs  Generic drug:  Empagliflozin   TAKE 10 MG BY MOUTH EVERY DAY.  Dose:  10 mg     lisinopril 10 MG Tabs  Commonly known as:  PRINIVIL   TAKE 1 TAB BY MOUTH EVERY DAY.  Dose:  10 mg     metFORMIN 500 MG Tabs  Commonly known as:  GLUCOPHAGE   Take 1 Tab by mouth 2 times a day, with meals.  Dose:  500 mg     omeprazole 20 MG Tbec delayed-release tablet  Commonly known as:  PRILOSEC   Take 1 Tab by mouth every day.  Dose:  20 mg     vitamin D (Ergocalciferol) 05332 units Caps capsule  Commonly known as:  DRISDOL   Take 1 Cap by mouth every 28 days.  Dose:  78551 Units        STOP taking these medications    ibuprofen 200 MG Tabs  Commonly known as:  MOTRIN               Diet:       DIET ORDERS (Through next 24h)    None            Activity:   As tolerated.      Code status:   Full code    Primary Care Provider:    Keon Talamantes M.D.    Follow up appointment details :      .  Keon Talamantes M.D.  [address] [place] [id number]  [phone]    In 2 weeks      Future Appointments  Date Time Provider Department Center   5/9/2018 4:50 PM Keon Talamantes M.D. [facility]   5/10/2018 3:40 PM DOMENICA Garcia [facility] None           Time spent on discharge day patient visit: 37 minutes    #################################################

## 2018-05-09 ENCOUNTER — OFFICE VISIT (OUTPATIENT)
Dept: MEDICAL GROUP | Facility: MEDICAL CENTER | Age: 58
End: 2018-05-09
Attending: FAMILY MEDICINE
Payer: MEDICAID

## 2018-05-09 VITALS
HEART RATE: 92 BPM | BODY MASS INDEX: 27.88 KG/M2 | OXYGEN SATURATION: 95 % | SYSTOLIC BLOOD PRESSURE: 118 MMHG | DIASTOLIC BLOOD PRESSURE: 65 MMHG | WEIGHT: 142 LBS | TEMPERATURE: 97.8 F | HEIGHT: 60 IN | RESPIRATION RATE: 14 BRPM

## 2018-05-09 DIAGNOSIS — R20.2 NUMBNESS AND TINGLING IN LEFT ARM: ICD-10-CM

## 2018-05-09 DIAGNOSIS — E03.9 HYPOTHYROIDISM, UNSPECIFIED TYPE: ICD-10-CM

## 2018-05-09 DIAGNOSIS — E11.8 DM (DIABETES MELLITUS) WITH COMPLICATIONS (HCC): ICD-10-CM

## 2018-05-09 DIAGNOSIS — R07.89 ATYPICAL CHEST PAIN: ICD-10-CM

## 2018-05-09 DIAGNOSIS — R20.0 NUMBNESS AND TINGLING IN LEFT ARM: ICD-10-CM

## 2018-05-09 PROCEDURE — 99214 OFFICE O/P EST MOD 30 MIN: CPT | Performed by: FAMILY MEDICINE

## 2018-05-09 PROCEDURE — 99213 OFFICE O/P EST LOW 20 MIN: CPT | Performed by: FAMILY MEDICINE

## 2018-05-09 RX ORDER — LEVOTHYROXINE SODIUM 0.03 MG/1
25 TABLET ORAL
Qty: 30 TAB | Refills: 3 | Status: SHIPPED | OUTPATIENT
Start: 2018-05-09 | End: 2018-09-19 | Stop reason: SDUPTHER

## 2018-05-09 RX ORDER — GABAPENTIN 100 MG/1
100 CAPSULE ORAL 3 TIMES DAILY
Qty: 90 CAP | Refills: 3 | Status: SHIPPED | OUTPATIENT
Start: 2018-05-09 | End: 2018-10-04 | Stop reason: SDUPTHER

## 2018-05-09 ASSESSMENT — ENCOUNTER SYMPTOMS
TINGLING: 1
VOMITING: 0
FEVER: 0
ABDOMINAL PAIN: 0
CHILLS: 0
PALPITATIONS: 0
NAUSEA: 0
COUGH: 0
SHORTNESS OF BREATH: 0
SPUTUM PRODUCTION: 0

## 2018-05-09 NOTE — PROGRESS NOTES
Subjective:      Shahida Price is a 57 y.o. female who presents with Follow-Up; Diabetes; and Medication Refill            Patient here for follow-up of recent hospitalization for atypical chest pain. While in the hospital she had received an angiogram which was interpreted as follows:    Procedure(s) :  Coronary Angiography, Left Heart Catheterization, Left Ventriculography.  4F catheters RFA     Surgeon(s):  Gerardo Man M.D.     Type of Anesthesia: Moderate Sedation     Specimen: None     Estimated Blood Loss: <10 cc's     Contrast Media:  83 cc's     Fluoro Time: 1.6 min     Xray DAP: 4555     Findings: Normal coronary arteries.  EF 75%      Complications: none    She has not had any chest pain since being discharged from the hospital, and has been taking her medications as directed by the cardiologist. She does have a follow-up appointment coming up with cardiology for continued management of her possible heart disease.  Will have her continue to use her diabetic medications as previously directed. She will also continue to check her blood sugars as directed, will check an A1c and a microalbumin creatinine ratio prior to her next visit. She will also continue to check her feet daily for any skin breakdown or changes in sensation.    Her last soft tissue neck ultrasound showed:    Tiny bilateral thyroid nodule      Her last TSH was 3.8, which was slightly higher then the upper limit of 3.5. We'll have her start a low-dose of Synthroid and will recheck her TSH and T4 levels in approximately 6 weeks. If she has any symptoms of hyper thyroidism will have her discontinue the medication. We'll continue to follow.    She has also been having numbness tingling and pain in her left arm. She states that the pain does seem to radiate from her neck down her arm. An EMG will be ordered to further assess for any nerve injury. But will start a low-dose of Neurontin to see if that will minimize some of the nerve pain. Also  an x-ray of her neck has been ordered for further evaluation. We'll continue to follow.        Review of Systems   Constitutional: Negative for chills and fever.   HENT: Negative for hearing loss and tinnitus.    Respiratory: Negative for cough, sputum production and shortness of breath.    Cardiovascular: Negative for chest pain and palpitations.   Gastrointestinal: Negative for abdominal pain, nausea and vomiting.   Musculoskeletal: Positive for joint pain.   Neurological: Positive for tingling.          Objective:     /65   Pulse 92   Temp 36.6 °C (97.8 °F)   Resp 14   Ht 1.524 m (5')   Wt 64.4 kg (142 lb)   SpO2 95%   BMI 27.73 kg/m²      Physical Exam   Constitutional: She is oriented to person, place, and time. She appears well-developed and well-nourished.   HENT:   Head: Normocephalic and atraumatic.   Cardiovascular: Normal rate, regular rhythm and normal heart sounds.  Exam reveals no friction rub.    No murmur heard.  Pulmonary/Chest: Effort normal and breath sounds normal. No respiratory distress. She has no wheezes. She has no rales.   Abdominal: Soft. Bowel sounds are normal. She exhibits no distension. There is no tenderness.   Neurological: She is alert and oriented to person, place, and time. No cranial nerve deficit. Coordination normal.   Tingling and numbness in L hand and arm   Skin: Skin is warm and dry.   Psychiatric: She has a normal mood and affect. Her behavior is normal.   Nursing note and vitals reviewed.              Assessment/Plan:     1. Atypical chest pain  Will have her continue to take her medication as directed. She has an upcoming appt with cardiology for follow up of her chest pain, will continue to follow. ER precautions given to pt.    2. Numbness and tingling in left arm  Will have her start Neurontin at 100 mg 3 times a day. An x-ray of her neck and then EMG study has been ordered to further assess her left arm numbness and tingling and pain. We'll continue to  follow.  - REFERRAL TO NEURODIAGNOSTICS (EEG,EP,EMG/NCS/DBS) Modality Requested: EMG/NCS-Comment Extremities  - DX-CERVICAL SPINE-2 OR 3 VIEWS; Future  - gabapentin (NEURONTIN) 100 MG Cap; Take 1 Cap by mouth 3 times a day.  Dispense: 90 Cap; Refill: 3    3. DM (diabetes mellitus) with complications (HCC)  Will have her continue to use medication as directed. Recheck an A1c and microalbumin creatinine ratio. Also have her continue to check her feet daily for any skin breakdown or changes in sensation.  - COMP METABOLIC PANEL; Future  - HEMOGLOBIN A1C; Future  - MICROALBUMIN CREAT RATIO URINE; Future    4. Hypothyroidism, unspecified type  Will have her start a low dose of thyroid medication at 25 µg. We'll repeat a TSH and a T4 level in the next 6 weeks. Also reviewed the results of her most recent soft tissue neck ultrasound and TSH level. We'll continue to follow.  - TSH WITH REFLEX TO FT4; Future

## 2018-05-14 ENCOUNTER — OFFICE VISIT (OUTPATIENT)
Dept: CARDIOLOGY | Facility: MEDICAL CENTER | Age: 58
End: 2018-05-14
Payer: MEDICAID

## 2018-05-14 VITALS
HEIGHT: 60 IN | SYSTOLIC BLOOD PRESSURE: 116 MMHG | BODY MASS INDEX: 27.8 KG/M2 | WEIGHT: 141.6 LBS | OXYGEN SATURATION: 94 % | DIASTOLIC BLOOD PRESSURE: 66 MMHG | RESPIRATION RATE: 12 BRPM | HEART RATE: 92 BPM

## 2018-05-14 DIAGNOSIS — M75.02 ADHESIVE CAPSULITIS OF BOTH SHOULDERS: ICD-10-CM

## 2018-05-14 DIAGNOSIS — M75.01 ADHESIVE CAPSULITIS OF BOTH SHOULDERS: ICD-10-CM

## 2018-05-14 DIAGNOSIS — R07.89 OTHER CHEST PAIN: ICD-10-CM

## 2018-05-14 DIAGNOSIS — I10 ESSENTIAL HYPERTENSION: ICD-10-CM

## 2018-05-14 PROCEDURE — 99213 OFFICE O/P EST LOW 20 MIN: CPT | Performed by: NURSE PRACTITIONER

## 2018-05-14 RX ORDER — CALCIUM CITRATE/VITAMIN D3 200MG-6.25
TABLET ORAL
COMMUNITY
Start: 2018-04-27 | End: 2018-05-29 | Stop reason: SDUPTHER

## 2018-05-14 RX ORDER — LANCETS 28 GAUGE
EACH MISCELLANEOUS
COMMUNITY
Start: 2018-04-24

## 2018-05-14 ASSESSMENT — ENCOUNTER SYMPTOMS
ORTHOPNEA: 0
SENSORY CHANGE: 1
PALPITATIONS: 0
MYALGIAS: 0
SHORTNESS OF BREATH: 0
ABDOMINAL PAIN: 0
WEAKNESS: 0
CLAUDICATION: 0
NECK PAIN: 1
PND: 0
COUGH: 0
DIZZINESS: 0

## 2018-05-14 NOTE — PROGRESS NOTES
Chief Complaint   Patient presents with   • Chest Pain       Subjective:   Shahida Price is a 57 y.o. female who presents today for follow-up after hospitalization for chest pain.    She had episode of chest pain with radiation to her jaw and shoulder with exertion that went away with rest. She had 2 episodes of this therefore she presented to her primary care who referred her to the hospital.    She had previously undergone a nuclear stress test which was negative for ischemia therefore she was taken to the Cath Lab. Her cardiac catheterization showed normal coronary arteries with ejection fraction 75%.    Since being out of the hospital, she continues to have pain in the left side of her neck and in her left shoulder. She's been seen by her primary care, Dr. Talamantes thinks this is musculoskeletal and nerve compression from her shoulder.    She's not had any exertional chest tightness, heaviness or pressure.    Past Medical History:   Diagnosis Date   • Allergy    • Atopic dermatitis    • Depression    • Diabetes (HCC)    • Gastritis    • Hyperlipidemia    • Hypertension      Past Surgical History:   Procedure Laterality Date   • CARDIAC CATH  04/25/2018    normal coronaires   • CHOLECYSTECTOMY  2007   • OTHER ORTHOPEDIC SURGERY  2007    shoulder surgery     Family History   Problem Relation Age of Onset   • Cancer Mother      lung cancer, chronic smoker   • Lung Disease Mother    • Heart Attack Mother    • Diabetes Father      Social History     Social History   • Marital status:      Spouse name: N/A   • Number of children: N/A   • Years of education: N/A     Occupational History   • Not on file.     Social History Main Topics   • Smoking status: Former Smoker     Packs/day: 1.00     Years: 15.00     Quit date: 2004   • Smokeless tobacco: Never Used   • Alcohol use No      Comment: 1 beer / month   • Drug use: No   • Sexual activity: Yes     Partners: Male      Comment: menopause 2015     Other Topics Concern    • Not on file     Social History Narrative   • No narrative on file     Allergies   Allergen Reactions   • Shellfish Allergy Hives and Rash     Shellfish     Outpatient Encounter Prescriptions as of 5/14/2018   Medication Sig Dispense Refill   • TRUE METRIX BLOOD GLUCOSE TEST strip      • TRUEPLUS SAFETY LANCETS 28G Misc      • levothyroxine (SYNTHROID) 25 MCG Tab Take 1 Tab by mouth Every morning on an empty stomach. 30 Tab 3   • gabapentin (NEURONTIN) 100 MG Cap Take 1 Cap by mouth 3 times a day. 90 Cap 3   • lisinopril (PRINIVIL) 10 MG Tab TAKE 1 TAB BY MOUTH EVERY DAY. 30 Tab 3   • JARDIANCE 10 MG Tab TAKE 10 MG BY MOUTH EVERY DAY. 30 Tab 3   • metformin (GLUCOPHAGE) 500 MG Tab Take 1 Tab by mouth 2 times a day, with meals. 60 Tab 3   • atorvastatin (LIPITOR) 20 MG Tab Take 1 Tab by mouth every day. 30 Tab 6   • aspirin (ASA) 81 MG Chew Tab chewable tablet Take 1 Tab by mouth every day. 100 Tab 3   • vitamin D, Ergocalciferol, (DRISDOL) 21151 units Cap capsule Take 1 Cap by mouth every 28 days. 3 Cap 3   • omeprazole (PRILOSEC) 20 MG Tablet Delayed Response delayed-release tablet Take 1 Tab by mouth every day. 30 Tab 6   • albuterol 108 (90 BASE) MCG/ACT Aero Soln inhalation aerosol Inhale 2 Puffs by mouth every 6 hours as needed for Shortness of Breath.     • betamethasone dipropionate (DIPROLENE) 0.05 % Cream Apply 1 Application to affected area(s) 2 times a day. Apply to wrist       No facility-administered encounter medications on file as of 5/14/2018.      Review of Systems   Constitutional: Negative for malaise/fatigue.   Respiratory: Negative for cough and shortness of breath.    Cardiovascular: Negative for chest pain, palpitations, orthopnea, claudication, leg swelling and PND.   Gastrointestinal: Negative for abdominal pain.   Musculoskeletal: Positive for joint pain (chronic pain in both shoulders.) and neck pain (pain at the base of her neck radiating down her left shoulder.). Negative for  myalgias.   Neurological: Positive for sensory change (numbness in her third and fourth fingers on her left hand that comes and goes.). Negative for dizziness and weakness.        Objective:   /66   Pulse 92   Resp 12   Ht 1.524 m (5')   Wt 64.2 kg (141 lb 9.6 oz)   SpO2 94%   BMI 27.65 kg/m²     Physical Exam   Constitutional: She is oriented to person, place, and time. She appears well-developed and well-nourished.   HENT:   Head: Normocephalic.   Eyes: EOM are normal.   Neck: No JVD present.   Cardiovascular: Normal rate, regular rhythm and normal heart sounds.    Pulmonary/Chest: Effort normal and breath sounds normal.   Abdominal: Soft. Bowel sounds are normal.   Musculoskeletal: She exhibits no edema.   Left trapezius muscle is very tight with spasm palpated. Pressure reproduces her pain. Full sensation and movement in her left hand.   Neurological: She is alert and oriented to person, place, and time.   Skin: Skin is warm and dry.   Psychiatric: She has a normal mood and affect.     Results for TONY MOSER (MRN [id number])    Ref. Range 4/26/2018 04:10   Sodium Latest Ref Range: 135 - 145 mmol/L 140   Potassium Latest Ref Range: 3.6 - 5.5 mmol/L 3.9   Chloride Latest Ref Range: 96 - 112 mmol/L 111   Co2 Latest Ref Range: 20 - 33 mmol/L 23   Anion Gap Latest Ref Range: 0.0 - 11.9  6.0   Glucose Latest Ref Range: 65 - 99 mg/dL 117 (H)   Bun Latest Ref Range: 8 - 22 mg/dL 12   Creatinine Latest Ref Range: 0.50 - 1.40 mg/dL 0.62   GFR If  Latest Ref Range: >60 mL/min/1.73 m 2 >60   GFR If Non  Latest Ref Range: >60 mL/min/1.73 m 2 >60   Calcium Latest Ref Range: 8.5 - 10.5 mg/dL 8.8     Results for TONY MOSER (MRN [id number])    Ref. Range 10/31/2017 08:41   Cholesterol,Tot Latest Ref Range: 100 - 199 mg/dL 117   Triglycerides Latest Ref Range: 0 - 149 mg/dL 154 (H)   HDL Latest Ref Range: >=40 mg/dL 35 (A)   LDL Latest Ref Range: <100 mg/dL 51       Assessment:     1.  Other chest pain     2. Essential hypertension     3. Adhesive capsulitis of both shoulders         Medical Decision Making:  Today's Assessment / Status / Plan:     Chest pain: She has mostly shoulder and neck pain but did have anterior chest pain with exertion prior to hospitalization. She has no further episodes of this. Cardiac catheterization was negative for coronary artery disease. She will follow with her primary care regarding her shoulder pain.    Hypertension: Blood pressure is excellent and the office today. She does describe a cough which could be due to the lisinopril. I will leave it up to her primary care to further evaluate this and change medication if appropriate.    Shoulder pain: She has chronic problems with her shoulders which is probably the cause of her pain. She will follow with other providers.    She does not have a cardiac problem that needs to be followed by our office. However she is welcome to return to our office should she need our services.    Collaborating Provider: Dr Farley.

## 2018-05-29 RX ORDER — CALCIUM CITRATE/VITAMIN D3 200MG-6.25
TABLET ORAL
Qty: 50 STRIP | Refills: 0 | Status: SHIPPED | OUTPATIENT
Start: 2018-05-29 | End: 2019-01-23 | Stop reason: SDUPTHER

## 2018-06-15 LAB — HBA1C MFR BLD: 6.4 % (ref ?–5.8)

## 2018-06-20 ENCOUNTER — OFFICE VISIT (OUTPATIENT)
Dept: MEDICAL GROUP | Facility: MEDICAL CENTER | Age: 58
End: 2018-06-20
Attending: FAMILY MEDICINE
Payer: MEDICAID

## 2018-06-20 VITALS
HEIGHT: 61 IN | OXYGEN SATURATION: 95 % | DIASTOLIC BLOOD PRESSURE: 78 MMHG | SYSTOLIC BLOOD PRESSURE: 115 MMHG | RESPIRATION RATE: 14 BRPM | HEART RATE: 92 BPM | TEMPERATURE: 97.9 F | BODY MASS INDEX: 26.06 KG/M2 | WEIGHT: 138 LBS

## 2018-06-20 DIAGNOSIS — J06.9 UPPER RESPIRATORY TRACT INFECTION, UNSPECIFIED TYPE: ICD-10-CM

## 2018-06-20 DIAGNOSIS — E11.8 DM (DIABETES MELLITUS) WITH COMPLICATIONS (HCC): ICD-10-CM

## 2018-06-20 DIAGNOSIS — I10 ESSENTIAL HYPERTENSION: ICD-10-CM

## 2018-06-20 DIAGNOSIS — L20.82 FLEXURAL ECZEMA: ICD-10-CM

## 2018-06-20 PROCEDURE — 99212 OFFICE O/P EST SF 10 MIN: CPT | Performed by: FAMILY MEDICINE

## 2018-06-20 PROCEDURE — 99214 OFFICE O/P EST MOD 30 MIN: CPT | Performed by: FAMILY MEDICINE

## 2018-06-20 RX ORDER — BETAMETHASONE DIPROPIONATE 0.5 MG/G
1 CREAM TOPICAL 2 TIMES DAILY
Qty: 1 TUBE | Refills: 3 | Status: SHIPPED | OUTPATIENT
Start: 2018-06-20 | End: 2019-05-30

## 2018-06-20 RX ORDER — ALBUTEROL SULFATE 90 UG/1
2 AEROSOL, METERED RESPIRATORY (INHALATION) EVERY 6 HOURS PRN
Qty: 8.5 G | Refills: 6 | Status: SHIPPED | OUTPATIENT
Start: 2018-06-20 | End: 2019-01-23 | Stop reason: SDUPTHER

## 2018-06-20 ASSESSMENT — ENCOUNTER SYMPTOMS
FEVER: 0
NAUSEA: 0
HEADACHES: 0
SPUTUM PRODUCTION: 0
SINUS PAIN: 0
PALPITATIONS: 0
HEARTBURN: 0
RHINORRHEA: 1
WEIGHT LOSS: 0
COUGH: 1
SWEATS: 0
SHORTNESS OF BREATH: 0
SORE THROAT: 0
MYALGIAS: 0
CHILLS: 0
ABDOMINAL PAIN: 0
HEMOPTYSIS: 0
WHEEZING: 0
VOMITING: 0

## 2018-06-20 NOTE — PROGRESS NOTES
Medication Subjective:      Shahida Price is a 57 y.o. female who presents with Follow-Up (referral) and Results (labs)            Patient 57-year-old female here for follow-up of her recent blood work. She has a history of diabetes, hypertension, eczema, and recent upper respiratory infection.    Most recent hemoglobin A1c was 6.4 with a normal microalbumin creatinine ratio. She will continue to use her metformin as previously directed. She will check her feet daily for any skin breakdown or changes in sensation. Also recommended she follow-up with her ophthalmologist for a diabetic retinal exam. We'll continue to follow.    Her blood pressure today appears to be well managed with her current blood pressure medication. She has not been having any chest pain or shortness of breath. She was referred to cardiology for further assistance in management of her periodic anginal pain. Today she is doing well, but she has been advised to go to the emergency room if she should have any sudden changes or worsening of her chest pain.    She will need a refill of her eczema cream. She has also been advised to moisturize her skin to lessen her eczema flareup. We'll continue to follow.     Current medications, allergies, and problem list reviewed with patient and updated in appening.        Cough   This is a recurrent problem. The current episode started 1 to 4 weeks ago. The problem has been gradually improving. Associated symptoms include nasal congestion, postnasal drip, a rash and rhinorrhea. Pertinent negatives include no chest pain, chills, ear congestion, ear pain, fever, headaches, heartburn, hemoptysis, myalgias, sore throat, shortness of breath, sweats, weight loss or wheezing. She has tried a beta-agonist inhaler and OTC cough suppressant for the symptoms. Her past medical history is significant for asthma.       Review of Systems   Constitutional: Negative for chills, fever and weight loss.   HENT: Positive for congestion,  "postnasal drip and rhinorrhea. Negative for ear pain, sinus pain and sore throat.    Respiratory: Positive for cough. Negative for hemoptysis, sputum production, shortness of breath and wheezing.    Cardiovascular: Negative for chest pain and palpitations.   Gastrointestinal: Negative for abdominal pain, heartburn, nausea and vomiting.   Musculoskeletal: Negative for myalgias.   Skin: Positive for itching and rash.   Neurological: Negative for headaches.          Objective:     /78   Pulse 92   Temp 36.6 °C (97.9 °F)   Resp 14   Ht 1.549 m (5' 1\")   Wt 62.6 kg (138 lb)   SpO2 95%   BMI 26.07 kg/m²      Physical Exam   Constitutional: She is oriented to person, place, and time. She appears well-developed and well-nourished.   HENT:   Head: Normocephalic and atraumatic.   Congestion, slight pharyngeal erythema   Neck: Normal range of motion. Neck supple. No thyromegaly present.   Cardiovascular: Normal rate, regular rhythm and normal heart sounds.  Exam reveals no friction rub.    No murmur heard.  Pulmonary/Chest: Effort normal and breath sounds normal. No respiratory distress. She has no wheezes. She has no rales.   Abdominal: Soft. Bowel sounds are normal. She exhibits no distension. There is no tenderness.   Lymphadenopathy:     She has no cervical adenopathy.   Neurological: She is alert and oriented to person, place, and time.   Skin: Skin is warm and dry. Rash noted. There is erythema.   Flexural surface of L hand   Psychiatric: She has a normal mood and affect.   Nursing note and vitals reviewed.              Assessment/Plan:     1. Essential hypertension  Will continue to use her medication as previously directed. She has been advised to monitor blood pressure at home and keep notes. If blood pressure elevated or having symptoms of CP, SOB or neurologic changes to go to the er.    - betamethasone dipropionate (DIPROLENE) 0.05 % Cream; Apply 1 Application to affected area(s) 2 times a day. Apply " to wrist  Dispense: 1 Tube; Refill: 3    2. DM (diabetes mellitus) with complications (HCC)  Will have her continue to use her medication as previously directed. Reviewed the results of her recent blood work. She will also continue to check her feet daily for any skin breakdown or changes in sensation. We'll continue to follow    3. Flexural eczema  Will have her continue to use her steroid as directed. Also recommended using moisturizing creams to prevent dryness of her skin. We'll continue to follow.    4. Upper respiratory tract infection, unspecified type  Symptoms are improving. We'll have her continue to use her over-the-counter medications as directed. Will also send in a refill of her inhaler. For her to continue using as directed. Will continue to follow.

## 2018-07-15 DIAGNOSIS — E78.5 DYSLIPIDEMIA: ICD-10-CM

## 2018-07-15 DIAGNOSIS — I10 ESSENTIAL HYPERTENSION: ICD-10-CM

## 2018-07-15 DIAGNOSIS — E11.8 TYPE 2 DIABETES MELLITUS WITH COMPLICATION, WITHOUT LONG-TERM CURRENT USE OF INSULIN (HCC): ICD-10-CM

## 2018-07-16 RX ORDER — ATORVASTATIN CALCIUM 20 MG/1
20 TABLET, FILM COATED ORAL
Qty: 30 TAB | Refills: 6 | Status: SHIPPED | OUTPATIENT
Start: 2018-07-16 | End: 2019-01-23 | Stop reason: SDUPTHER

## 2018-07-26 ENCOUNTER — OFFICE VISIT (OUTPATIENT)
Dept: MEDICAL GROUP | Facility: MEDICAL CENTER | Age: 58
End: 2018-07-26
Attending: FAMILY MEDICINE
Payer: MEDICAID

## 2018-07-26 VITALS
RESPIRATION RATE: 19 BRPM | OXYGEN SATURATION: 98 % | TEMPERATURE: 99.1 F | HEIGHT: 61 IN | HEART RATE: 77 BPM | WEIGHT: 137.8 LBS | DIASTOLIC BLOOD PRESSURE: 64 MMHG | SYSTOLIC BLOOD PRESSURE: 112 MMHG | BODY MASS INDEX: 26.01 KG/M2

## 2018-07-26 DIAGNOSIS — R21 RASH AND NONSPECIFIC SKIN ERUPTION: ICD-10-CM

## 2018-07-26 PROCEDURE — 99214 OFFICE O/P EST MOD 30 MIN: CPT | Performed by: FAMILY MEDICINE

## 2018-07-26 PROCEDURE — 99212 OFFICE O/P EST SF 10 MIN: CPT | Performed by: FAMILY MEDICINE

## 2018-07-26 RX ORDER — CLOTRIMAZOLE AND BETAMETHASONE DIPROPIONATE 10; .64 MG/G; MG/G
1 CREAM TOPICAL 2 TIMES DAILY
Qty: 1 TUBE | Refills: 1 | Status: SHIPPED | OUTPATIENT
Start: 2018-07-26 | End: 2019-05-30

## 2018-07-26 NOTE — PROGRESS NOTES
Chief Complaint:   Chief Complaint   Patient presents with   • Rash       HPI:Established patient of Dr Albin Price is a 58 y.o. female who presents for concerns about rash     Rash and nonspecific skin eruption     Patient reports this rash on her left knee for the past 3 weeks, has been using over-the-counter and home remedies without improvement, she describes the rash as started as a circular rash on her left knee after she reports kneeling on her left knee in a store, after that she developed this rash that is very itchy, it was circular, but the circumflex and is increasing in size. Has been using antibiotics ointment that she had at home, applying vinegar and lemon, sees no improvement. Denies any other rashes in other parts of her body. Denies fever or joint pain or GI symptoms. Denies changes in her routine did not change her lotions or creams or perfumes are any new food exposure        Past medical history, family history, social history and medications reviewed and updated in the record. Today   Current medications, problem list and allergies reviewed in [facility] today   Health maintenance topics are reviewed and updated.    Patient Active Problem List    Diagnosis Date Noted   • Chest pain 08/17/2017     Priority: High   • Essential hypertension 04/17/2017     Priority: Medium   • Mood disorder (HCC) 04/17/2017     Priority: Medium   • Type 2 diabetes mellitus (HCC) 04/17/2017     Priority: Low   • Dyslipidemia 04/17/2017     Priority: Low   • Hemorrhoids 12/19/2017   • History of gastritis 04/17/2017   • Atypical chest pain 04/17/2017   • Obesity 04/17/2017   • Flexural eczema 04/17/2017   • Hair loss 04/17/2017   • Adhesive capsulitis of both shoulders 04/17/2017     Family History   Problem Relation Age of Onset   • Cancer Mother         lung cancer, chronic smoker   • Lung Disease Mother    • Heart Attack Mother    • Diabetes Father      Social History     Social History   • Marital status:       Spouse name: N/A   • Number of children: N/A   • Years of education: N/A     Occupational History   • Not on file.     Social History Main Topics   • Smoking status: Former Smoker     Packs/day: 1.00     Years: 15.00     Quit date: 2004   • Smokeless tobacco: Never Used   • Alcohol use No      Comment: 1 beer / month   • Drug use: No   • Sexual activity: Yes     Partners: Male      Comment: menopause 2015     Other Topics Concern   • Not on file     Social History Narrative   • No narrative on file       Current Outpatient Prescriptions   Medication Sig Dispense Refill   • clotrimazole-betamethasone (LOTRISONE) 1-0.05 % Cream Apply 1 Application to affected area(s) 2 times a day. 1 Tube 1   • metFORMIN (GLUCOPHAGE) 500 MG Tab TAKE 1 TAB BY MOUTH 2 TIMES A DAY, WITH MEALS. 60 Tab 0   • atorvastatin (LIPITOR) 20 MG Tab TAKE 1 TAB BY MOUTH EVERY DAY. 30 Tab 6   • albuterol 108 (90 Base) MCG/ACT Aero Soln inhalation aerosol Inhale 2 Puffs by mouth every 6 hours as needed for Shortness of Breath. 8.5 g 6   • betamethasone dipropionate (DIPROLENE) 0.05 % Cream Apply 1 Application to affected area(s) 2 times a day. Apply to wrist 1 Tube 3   • Blood Glucose Monitoring Suppl (TRUE METRIX METER) w/Device Kit USE ONCE DAILY TO TEST BLOOD SUGAR 1 Kit 0   • TRUE METRIX BLOOD GLUCOSE TEST strip USE TO TEST BLOOD GLUCOSE ONCE A DAY 50 Strip 0   • TRUEPLUS SAFETY LANCETS 28G Misc      • levothyroxine (SYNTHROID) 25 MCG Tab Take 1 Tab by mouth Every morning on an empty stomach. 30 Tab 3   • gabapentin (NEURONTIN) 100 MG Cap Take 1 Cap by mouth 3 times a day. 90 Cap 3   • lisinopril (PRINIVIL) 10 MG Tab TAKE 1 TAB BY MOUTH EVERY DAY. 30 Tab 3   • JARDIANCE 10 MG Tab TAKE 10 MG BY MOUTH EVERY DAY. 30 Tab 3   • aspirin (ASA) 81 MG Chew Tab chewable tablet Take 1 Tab by mouth every day. 100 Tab 3   • vitamin D, Ergocalciferol, (DRISDOL) 59508 units Cap capsule Take 1 Cap by mouth every 28 days. 3 Cap 3   • omeprazole  "(PRILOSEC) 20 MG Tablet Delayed Response delayed-release tablet Take 1 Tab by mouth every day. 30 Tab 6     No current facility-administered medications for this visit.          Review Of Systems  As documented in HPI above  PHYSICAL EXAMINATION:    /64   Pulse 77   Temp 37.3 °C (99.1 °F)   Resp 19   Ht 1.549 m (5' 1\")   Wt 62.5 kg (137 lb 12.8 oz)   SpO2 98%   BMI 26.04 kg/m²   Gen.: Well-developed, well-nourished, no apparent distress, pleasant and cooperative with the examination  HEENT: Normocephalic/atraumatic, Cor: Regular rate and rhythm without murmur gallop or rub  Lungs: Clear to auscultation with equal breath sounds bilaterally. No wheezes, rhonchi.  Skin: Left knee skin with evidence of a circular to oval rash that is erythematous with evidence of pruritus. No drainage  Extremities: No cyanosis, clubbing or edema          ASSESSMENT/Plan:  1. Rash and nonspecific skin eruption  , Allergic versus fungal, will treat with Lotrisone cream. If not improved in 2 weeks to come back for reassessment.  clotrimazole-betamethasone (LOTRISONE) 1-0.05 % Cream       Please note that this dictation was created using voice recognition software. I have made every reasonable attempt to correct obvious errors but there may be errors of grammar and content that I may have overlooked prior to finalization of this note.       "

## 2018-07-27 NOTE — PATIENT INSTRUCTIONS
Patient was advised to take all meds as directed , and to follow up regularly , to bring all meds each time she is coming to see me, medication SE discussed  . RTC as needed   ER warnings reviewed n

## 2018-08-13 DIAGNOSIS — E78.5 DYSLIPIDEMIA: ICD-10-CM

## 2018-08-13 DIAGNOSIS — I10 ESSENTIAL HYPERTENSION: ICD-10-CM

## 2018-08-13 DIAGNOSIS — E11.8 TYPE 2 DIABETES MELLITUS WITH COMPLICATION, WITHOUT LONG-TERM CURRENT USE OF INSULIN (HCC): ICD-10-CM

## 2018-08-13 RX ORDER — EMPAGLIFLOZIN 10 MG/1
10 TABLET, FILM COATED ORAL DAILY
Qty: 30 TAB | Refills: 3 | Status: SHIPPED | OUTPATIENT
Start: 2018-08-13 | End: 2018-12-07 | Stop reason: SDUPTHER

## 2018-08-13 RX ORDER — LISINOPRIL 10 MG/1
10 TABLET ORAL DAILY
Qty: 30 TAB | Refills: 3 | Status: SHIPPED | OUTPATIENT
Start: 2018-08-13 | End: 2018-12-12 | Stop reason: SDUPTHER

## 2018-09-06 ENCOUNTER — NON-PROVIDER VISIT (OUTPATIENT)
Dept: NEUROLOGY | Facility: MEDICAL CENTER | Age: 58
End: 2018-09-06
Payer: MEDICAID

## 2018-09-06 DIAGNOSIS — R20.0 TACTILE ANESTHESIA: ICD-10-CM

## 2018-09-06 DIAGNOSIS — R20.2 PARESTHESIA: ICD-10-CM

## 2018-09-06 DIAGNOSIS — M54.12 CERVICAL RADICULOPATHY: Primary | ICD-10-CM

## 2018-09-06 PROCEDURE — 95886 MUSC TEST DONE W/N TEST COMP: CPT | Performed by: PSYCHIATRY & NEUROLOGY

## 2018-09-06 PROCEDURE — 95908 NRV CNDJ TST 3-4 STUDIES: CPT | Performed by: PSYCHIATRY & NEUROLOGY

## 2018-09-07 NOTE — PROCEDURES
DATE OF SERVICE:  2018    This is an outpatient NCV/EMG that was done on 2018.    [facility]  Neurodiagnostic Laboratory  [address], [address]  [place] [address]  Tel: [phone]  Fax: [phone]  Test Date:  2018     Patient: Shahida Price : 1960 Physician: Dr Hernandez   Sex: Female Height:   Ref Phys: Dr Talamantes   ID#: [id number] Weight:   Technician: Sylvia Lam   CSN#: [id number]                 Nerve Conduction Studies      Stim Site NR Peak (ms) Norm Peak (ms) O-P Amp (µV) Norm O-P Amp Site1 Site2 Delta-P (ms) Dist (cm) Chavo (m/s) Norm Chavo (m/s)   Left Median Anti Sensory (2nd Digit)   Wrist    3.1 <3.6 48.0 >10 Wrist 2nd Digit 3.1 14.0 *45 >50   Left Ulnar Anti Sensory (5th Digit)   Wrist    3.1 <3.1 32.9 >10 Wrist 5th Digit 3.1 14.0 *45 >50          Stim Site NR Onset (ms) Norm Onset (ms) O-P Amp (mV) Norm O-P Amp Site1 Site2 Delta-0 (ms) Dist (cm) Chavo (m/s) Norm Chavo (m/s)   Left Median Motor (Abd Poll Brev)   Wrist    3.0 <4 10.8 >6 Elbow Wrist 4.0 23.0 58 >50   Elbow    7.0   8.6                 Left Ulnar Motor (Abd Dig Min)   Wrist    2.4 <3.1 7.3 >7 B Elbow Wrist 2.4 15.0 63 >50   B Elbow    4.8   7.0   A Elbow B Elbow 1.9 10.0 53     A Elbow    6.7   7.2                    F Wave Studies      NR F-Lat (ms) Lat Norm (ms)   Left Median (Abd Poll Brev)      21.71 <31   Left Ulnar (Abd Dig Min)      24.14 <32                            Electromyography      Side Muscle Nerve Root Ins Act Fibs Psw Amp Dur Poly Recrt Int Pat Comment   Left Biceps Musculocut C5-6 Nml Nml Nml Nml Nml 0 Nml Nml     Left Abd Poll Brev Median C8-T1 Nml Nml Nml Nml Nml 0 Nml Nml     Left FlexCarRad Median C6-7 Nml Nml Nml Nml Nml 0 Nml Nml     Left PronatorTeres Median C6-7 Nml Nml Nml Nml Nml 0 Nml Nml     Left FlexPolLong Median (Ant Int) C7-8 Nml Nml Nml Nml Nml 0 Nml Nml        DESCRIPTION:     Normal NCV and  EMG     SUMMARY:  ________________________________________________________________________        Normal NCV EMG     ________________________________________________________________________        cervical radiculopath remains possible, neuroimaging over the cervical spine might help     ________________________________________________________________________  Small fiber neuropathy and cervical radiculopath remains possible in patients with normal NCV EMG     Also, nerve conduction studies and EMG studies are useful supportive diagnostic tool but not confirmatory tests in majority of cases. Clinical Correlation is advised  ________________________________________________________________________                  ____________________________________     MD DERRICK HAZEL / JAMIE    DD:  09/06/2018 17:04:05  DT:  09/06/2018 17:07:59    D#:  [id number]  Job#:  [id number]    cc: JANETH SYED MD

## 2018-09-07 NOTE — PROGRESS NOTES
[facility]  Neurodiagnostic Laboratory  [address], [address]  [place] [address]  Tel: [phone]  Fax: [phone]  Test Date:  2018    Patient: Shahida Price : 1960 Physician: Dr Hernandez   Sex: Female Height:  Ref Phys: Dr Talamantes   ID#: [id number] Weight:  Technician: Sylvia Lam   CSN#: [id number]           Nerve Conduction Studies     Stim Site NR Peak (ms) Norm Peak (ms) O-P Amp (µV) Norm O-P Amp Site1 Site2 Delta-P (ms) Dist (cm) Chavo (m/s) Norm Chavo (m/s)   Left Median Anti Sensory (2nd Digit)   Wrist    3.1 <3.6 48.0 >10 Wrist 2nd Digit 3.1 14.0 *45 >50   Left Ulnar Anti Sensory (5th Digit)   Wrist    3.1 <3.1 32.9 >10 Wrist 5th Digit 3.1 14.0 *45 >50        Stim Site NR Onset (ms) Norm Onset (ms) O-P Amp (mV) Norm O-P Amp Site1 Site2 Delta-0 (ms) Dist (cm) Chavo (m/s) Norm Chavo (m/s)   Left Median Motor (Abd Poll Brev)   Wrist    3.0 <4 10.8 >6 Elbow Wrist 4.0 23.0 58 >50   Elbow    7.0  8.6          Left Ulnar Motor (Abd Dig Min)   Wrist    2.4 <3.1 7.3 >7 B Elbow Wrist 2.4 15.0 63 >50   B Elbow    4.8  7.0  A Elbow B Elbow 1.9 10.0 53    A Elbow    6.7  7.2            F Wave Studies     NR F-Lat (ms) Lat Norm (ms)   Left Median (Abd Poll Brev)      21.71 <31   Left Ulnar (Abd Dig Min)      24.14 <32                       Electromyography     Side Muscle Nerve Root Ins Act Fibs Psw Amp Dur Poly Recrt Int Pat Comment   Left Biceps Musculocut C5-6 Nml Nml Nml Nml Nml 0 Nml Nml    Left Abd Poll Brev Median C8-T1 Nml Nml Nml Nml Nml 0 Nml Nml    Left FlexCarRad Median C6-7 Nml Nml Nml Nml Nml 0 Nml Nml    Left PronatorTeres Median C6-7 Nml Nml Nml Nml Nml 0 Nml Nml    Left FlexPolLong Median (Ant Int) C7-8 Nml Nml Nml Nml Nml 0 Nml Nml      DESCRIPTION:    Normal NCV and EMG    SUMMARY:  ________________________________________________________________________      Normal NCV EMG    ________________________________________________________________________      cervical radiculopath  remains possible, neuroimaging over the cervical spine might help    ________________________________________________________________________  Small fiber neuropathy and cervical radiculopath remains possible in patients with normal NCV EMG    Also, nerve conduction studies and EMG studies are useful supportive diagnostic tool but not confirmatory tests in majority of cases. Clinical Correlation is advised  ________________________________________________________________________

## 2018-09-19 RX ORDER — LEVOTHYROXINE SODIUM 0.03 MG/1
25 TABLET ORAL
Qty: 30 TAB | Refills: 0 | Status: SHIPPED | OUTPATIENT
Start: 2018-09-19 | End: 2018-10-22 | Stop reason: SDUPTHER

## 2018-10-04 DIAGNOSIS — R20.2 NUMBNESS AND TINGLING IN LEFT ARM: ICD-10-CM

## 2018-10-04 DIAGNOSIS — R20.0 NUMBNESS AND TINGLING IN LEFT ARM: ICD-10-CM

## 2018-10-04 RX ORDER — GABAPENTIN 100 MG/1
CAPSULE ORAL
Qty: 90 CAP | Refills: 0 | Status: SHIPPED | OUTPATIENT
Start: 2018-10-04 | End: 2019-01-23

## 2018-10-22 RX ORDER — LEVOTHYROXINE SODIUM 0.03 MG/1
25 TABLET ORAL
Qty: 30 TAB | Refills: 0 | Status: SHIPPED | OUTPATIENT
Start: 2018-10-22 | End: 2018-11-19 | Stop reason: SDUPTHER

## 2018-11-01 ENCOUNTER — OFFICE VISIT (OUTPATIENT)
Dept: MEDICAL GROUP | Facility: MEDICAL CENTER | Age: 58
End: 2018-11-01
Attending: FAMILY MEDICINE
Payer: MEDICAID

## 2018-11-01 VITALS
DIASTOLIC BLOOD PRESSURE: 60 MMHG | OXYGEN SATURATION: 96 % | TEMPERATURE: 97.6 F | WEIGHT: 127 LBS | SYSTOLIC BLOOD PRESSURE: 118 MMHG | HEART RATE: 70 BPM | BODY MASS INDEX: 24.94 KG/M2 | RESPIRATION RATE: 16 BRPM | HEIGHT: 60 IN

## 2018-11-01 DIAGNOSIS — R20.0 NUMBNESS AND TINGLING IN LEFT ARM: ICD-10-CM

## 2018-11-01 DIAGNOSIS — J32.9 RHINOSINUSITIS: ICD-10-CM

## 2018-11-01 DIAGNOSIS — E78.5 DYSLIPIDEMIA: ICD-10-CM

## 2018-11-01 DIAGNOSIS — R20.2 NUMBNESS AND TINGLING IN LEFT ARM: ICD-10-CM

## 2018-11-01 DIAGNOSIS — I10 ESSENTIAL HYPERTENSION: ICD-10-CM

## 2018-11-01 DIAGNOSIS — E03.9 HYPOTHYROIDISM, UNSPECIFIED TYPE: ICD-10-CM

## 2018-11-01 DIAGNOSIS — R10.2 PELVIC PAIN: ICD-10-CM

## 2018-11-01 DIAGNOSIS — E11.00 TYPE 2 DIABETES MELLITUS WITH HYPEROSMOLARITY WITHOUT COMA, WITHOUT LONG-TERM CURRENT USE OF INSULIN (HCC): ICD-10-CM

## 2018-11-01 DIAGNOSIS — Z12.31 SCREENING MAMMOGRAM, ENCOUNTER FOR: ICD-10-CM

## 2018-11-01 LAB
HBA1C MFR BLD: 5.9 % (ref ?–5.8)
INT CON NEG: NEGATIVE
INT CON POS: POSITIVE

## 2018-11-01 PROCEDURE — 99212 OFFICE O/P EST SF 10 MIN: CPT | Performed by: FAMILY MEDICINE

## 2018-11-01 PROCEDURE — 99214 OFFICE O/P EST MOD 30 MIN: CPT | Performed by: FAMILY MEDICINE

## 2018-11-01 PROCEDURE — 83036 HEMOGLOBIN GLYCOSYLATED A1C: CPT | Performed by: FAMILY MEDICINE

## 2018-11-01 RX ORDER — CYCLOBENZAPRINE HCL 10 MG
10 TABLET ORAL 3 TIMES DAILY PRN
Qty: 30 TAB | Refills: 0 | Status: SHIPPED | OUTPATIENT
Start: 2018-11-01 | End: 2019-01-23 | Stop reason: SDUPTHER

## 2018-11-01 RX ORDER — GABAPENTIN 300 MG/1
300 CAPSULE ORAL 3 TIMES DAILY
Qty: 90 CAP | Refills: 3 | Status: SHIPPED | OUTPATIENT
Start: 2018-11-01 | End: 2019-01-23 | Stop reason: SDUPTHER

## 2018-11-01 RX ORDER — FLUTICASONE PROPIONATE 50 MCG
2 SPRAY, SUSPENSION (ML) NASAL DAILY
Qty: 16 G | Refills: 11 | Status: SHIPPED | OUTPATIENT
Start: 2018-11-01 | End: 2019-05-30 | Stop reason: SDUPTHER

## 2018-11-01 ASSESSMENT — ENCOUNTER SYMPTOMS
BACK PAIN: 0
HEADACHES: 0
PSYCHIATRIC NEGATIVE: 1
FEVER: 0
ABDOMINAL PAIN: 0
CHILLS: 0
VOMITING: 0
COUGH: 0
SHORTNESS OF BREATH: 0
SPUTUM PRODUCTION: 0
NAUSEA: 0
SEIZURES: 0
FOCAL WEAKNESS: 0
NECK PAIN: 0
SENSORY CHANGE: 0
SORE THROAT: 0
SINUS PAIN: 0
PALPITATIONS: 0
TREMORS: 0
SPEECH CHANGE: 0
TINGLING: 1

## 2018-11-01 NOTE — PROGRESS NOTES
Subjective:      Shahida Price is a 58 y.o. female who presents with No chief complaint on file.            Patient 58-year-old female here for follow-up of her type 2 diabetes, hypertension, dyslipidemia, numbness and tingling in her left arm, hypothyroidism, rhinosinusitis and pelvic pain.    She is currently seeing a specialist in [place] for her pelvic pain.  A transvaginal ultrasound has been ordered by them, so the ultrasound will be reordered for her to get done here and results will be sent to her specialist in [place].  We will continue to follow.  Screening mammogram was also ordered by the specialist so once again the mammogram will be reordered for her to get done at [facility] and the results will be sent to her specialist.    She has been taking her diabetic medications as directed, and her A1c today was at 5.9.  She has been watching her diet and also exercising as tolerated.  Have her continue to take her medications as directed which she will also be sent to a pharmacotherapy service for further evaluation of her current therapy since she has been running low in the morning.  She will also continue to check her feet daily for any skin breakdown or changes in sensation.  Also recommend the following up with ophthalmology for diabetic retinal exam.  We will continue to follow.    She has been having numbness and tingling in her left arm in the morning.  Patient has been laying on her left side due to the problems with her right shoulder.  Discussed with patient the possibility that the left arm numbness and tingling may be secondary to her sleeping on her left side at night.  Some reluctance to raise her arm above her head.  The posterior aspect of her shoulder.  We will have her increase her Neurontin dose to 300 mg as well as trying a muscle relaxer as needed.  Also discussed ordering an x-ray of her shoulder as well as referring to physical therapy for additional assistance in management.    She has a  history of hypothyroidism and is currently on thyroid medication.  We will recheck her TSH level as well as order an ultrasound of her thyroid.  She is not currently having any symptoms of hypo-or hyperthyroidism.  We will continue to follow.    She has also been having issues with runny nose and congestion.  We will have her try over-the-counter allergy cold medications.  We will also have her try Flonase prescription will be sent to her pharmacy.  And also recommended using nasal saline throughout the day.  We will continue to follow.     Current medications, allergies, and problem list reviewed with patient and updated in EPIC.          Review of Systems   Constitutional: Negative for chills and fever.   HENT: Positive for congestion. Negative for hearing loss, sinus pain, sore throat and tinnitus.    Respiratory: Negative for cough, sputum production and shortness of breath.    Cardiovascular: Negative for chest pain and palpitations.   Gastrointestinal: Negative for abdominal pain, nausea and vomiting.   Musculoskeletal: Positive for joint pain. Negative for back pain and neck pain.   Skin: Negative for rash.   Neurological: Positive for tingling. Negative for tremors, sensory change, speech change, focal weakness, seizures and headaches.   Psychiatric/Behavioral: Negative.           Objective:     Vitals:    11/01/18 0939   BP: 118/60   BP Location: Right arm   Patient Position: Sitting   Pulse: 70   Resp: 16   Temp: 36.4 °C (97.6 °F)   SpO2: 96%   Weight: 57.6 kg (127 lb)   Height: 1.524 m (5')          Physical Exam   Constitutional: She is oriented to person, place, and time. She appears well-developed and well-nourished.   HENT:   Head: Normocephalic and atraumatic.   congestion   Cardiovascular: Normal rate, regular rhythm and normal heart sounds.  Exam reveals no friction rub.    No murmur heard.  Pulmonary/Chest: Effort normal and breath sounds normal. No respiratory distress. She has no wheezes. She  has no rales.   Abdominal: Soft. Bowel sounds are normal. She exhibits no distension. There is no tenderness.   Musculoskeletal: She exhibits tenderness. She exhibits no edema.   Posterior shoulder on L and slight decreased range of motion in abduction.   Neurological: She is alert and oriented to person, place, and time.   Skin: Skin is warm and dry.               Assessment/Plan:     1. Type 2 diabetes mellitus with hyperosmolarity without coma, without long-term current use of insulin (HCC)  We will have her continue to use her medications as directed.  Should be referred to the pharmacotherapy service for additional assistance in management of her medications.  We will have her continue to check her feet daily for any skin breakdown or changes in sensation.  She will also follow-up with ophthalmology for yearly retinal exams.  - REFERRAL TO PHARMACOTHERAPY SERVICE  - POCT Hemoglobin A1C    2. Essential hypertension  We will have her continue to take her medication as directed. She has been advised to monitor blood pressure at home and keep notes. If blood pressure elevated or having symptoms of CP, SOB or neurologic changes to go to the er.      3. Dyslipidemia  We will have her continue to take her medication as directed. She has been advised to increase the fibers in his diet, avoid fatty/fried foods and try fish oil supplements if not allergic to seafood. Also advised to exercise as tolerated.       4. Numbness and tingling in left arm  She will increase her dose of gabapentin to 300 mg 3 times daily and will add Flexeril as needed.  Discussed physical therapy as well as getting x-ray of her shoulder if the medication changes do not work.  We will continue to follow.  - gabapentin (NEURONTIN) 300 MG Cap; Take 1 Cap by mouth 3 times a day.  Dispense: 90 Cap; Refill: 3  - cyclobenzaprine (FLEXERIL) 10 MG Tab; Take 1 Tab by mouth 3 times a day as needed.  Dispense: 30 Tab; Refill: 0    5. Hypothyroidism,  unspecified type  We will have her continue taking her medication as directed.  We will also check a TSH level as well as an ultrasound of her neck.  We will continue to follow.  - TSH WITH REFLEX TO FT4; Future  - US-SOFT TISSUES OF HEAD - NECK; Future    6. Rhinosinusitis  Will start Flonase as directed.  We will also have her use OTC medications as well as nasal saline.  We will continue to follow.  - fluticasone (FLONASE) 50 MCG/ACT nasal spray; Spray 2 Sprays in nose every day.  Dispense: 16 g; Refill: 11    7. Screening mammogram, encounter for  A mammogram has been ordered for patient for screening for breast cancer.  We will continue to follow.  - MA-SCREENING DIGITAL MAMMO; Future    8. Pelvic pain  She is currently following up with a specialist in [place].  An order for a transvaginal ultrasound was ordered by them the order will be reordered here and results will be sent to them prior to her next visit with him.  We will continue to follow.  - US-PELVIC TRANSVAGINAL ONLY; Future

## 2018-11-06 ENCOUNTER — NON-PROVIDER VISIT (OUTPATIENT)
Dept: MEDICAL GROUP | Facility: MEDICAL CENTER | Age: 58
End: 2018-11-06
Attending: FAMILY MEDICINE
Payer: MEDICAID

## 2018-11-06 VITALS
HEIGHT: 60 IN | DIASTOLIC BLOOD PRESSURE: 70 MMHG | SYSTOLIC BLOOD PRESSURE: 110 MMHG | BODY MASS INDEX: 26.23 KG/M2 | WEIGHT: 133.6 LBS

## 2018-11-06 ASSESSMENT — ENCOUNTER SYMPTOMS: DIABETIC ASSOCIATED SYMPTOMS: 0

## 2018-11-06 NOTE — PROGRESS NOTES
New Patient Consult Note  Primary care physician: Keon Talamantes M.D.    Reason for consult: Management of Controlled Type 2 Diabetes    HPI:  Shahida Price is a 58 y.o. old patient who comes in today for evaluation of the below stated problem.   Diabetes   She presents for her initial diabetic visit. She has type 2 diabetes mellitus. Her disease course has been stable. There are no hypoglycemic associated symptoms. There are no diabetic associated symptoms. There are no known risk factors for coronary artery disease. Current diabetic treatment includes oral agent (dual therapy). She is compliant with treatment most of the time. Her weight is stable. She is following a diabetic and generally healthy diet. She participates in exercise daily. There is no change in her home blood glucose trend. Her breakfast blood glucose is taken between 8-9 am. Her breakfast blood glucose range is generally  mg/dl.   Basic physiology of DMII was explained to patient as well as microvascular/macrovascular complications. The importance of keeping physical activity to continue diabetes control was discussed with the patient. Patient was also educated on keeping her diet and making better choices to keep her blood glucose under control.    Most Recent HbA1c:   Lab Results   Component Value Date/Time    HBA1C 5.9 11/01/2018 09:19 AM        Current Diabetes Regimen:  SGLT-2 Inhibitor:  Empagliflozin 10 mg once daily   Metformin 500 QD-BID      Before Breakfast:     Hypoglycemia:  None      ROS:  Constitutional: No weight loss  Cardiac: No palpitations or racing heart  Resp: No shortness of breath  Neuro: No numbness or tinging in feet  Endo: No heat or cold intolerance, no polyuria or polydipsia  All other systems were reviewed and were negative.    Past Medical History:  Patient Active Problem List    Diagnosis Date Noted   • Chest pain 08/17/2017     Priority: High   • Essential hypertension 04/17/2017     Priority: Medium   •  Mood disorder (HCC) 04/17/2017     Priority: Medium   • Type 2 diabetes mellitus ([facility]) 04/17/2017     Priority: Low   • Dyslipidemia 04/17/2017     Priority: Low   • Hemorrhoids 12/19/2017   • History of gastritis 04/17/2017   • Atypical chest pain 04/17/2017   • Obesity 04/17/2017   • Flexural eczema 04/17/2017   • Hair loss 04/17/2017   • Adhesive capsulitis of both shoulders 04/17/2017       Past Surgical History:  Past Surgical History:   Procedure Laterality Date   • CARDIAC CATH  04/25/2018    normal coronaires   • CHOLECYSTECTOMY  2007   • OTHER ORTHOPEDIC SURGERY  2007    shoulder surgery       Allergies:  Shellfish allergy    Social History:  Social History     Social History   • Marital status:      Spouse name: N/A   • Number of children: N/A   • Years of education: N/A     Occupational History   • Not on file.     Social History Main Topics   • Smoking status: Former Smoker     Packs/day: 1.00     Years: 15.00     Quit date: 2004   • Smokeless tobacco: Never Used   • Alcohol use No      Comment: 1 beer / month   • Drug use: No   • Sexual activity: Yes     Partners: Male      Comment: menopause 2015     Other Topics Concern   • Not on file     Social History Narrative   • No narrative on file       Family History:  Family History   Problem Relation Age of Onset   • Cancer Mother         lung cancer, chronic smoker   • Lung Disease Mother    • Heart Attack Mother    • Diabetes Father        Medications:    Current Outpatient Prescriptions:   •  gabapentin (NEURONTIN) 300 MG Cap, Take 1 Cap by mouth 3 times a day., Disp: 90 Cap, Rfl: 3  •  cyclobenzaprine (FLEXERIL) 10 MG Tab, Take 1 Tab by mouth 3 times a day as needed., Disp: 30 Tab, Rfl: 0  •  fluticasone (FLONASE) 50 MCG/ACT nasal spray, Spray 2 Sprays in nose every day., Disp: 16 g, Rfl: 11  •  levothyroxine (SYNTHROID) 25 MCG Tab, TAKE 1 TAB BY MOUTH EVERY MORNING ON AN EMPTY STOMACH., Disp: 30 Tab, Rfl: 0  •  gabapentin (NEURONTIN) 100 MG  Cap, TAKE ONE TABLET BY MOUTH THREE TIMES A DAY, Disp: 90 Cap, Rfl: 0  •  metFORMIN (GLUCOPHAGE) 500 MG Tab, TAKE 1 TABLET BY MOUTH 2 TIMES A DAY WITH MEALS, Disp: 60 Tab, Rfl: 3  •  lisinopril (PRINIVIL) 10 MG Tab, TAKE 1 TAB BY MOUTH EVERY DAY., Disp: 30 Tab, Rfl: 3  •  JARDIANCE 10 MG Tab, TAKE 10 MG BY MOUTH EVERY DAY., Disp: 30 Tab, Rfl: 3  •  clotrimazole-betamethasone (LOTRISONE) 1-0.05 % Cream, Apply 1 Application to affected area(s) 2 times a day., Disp: 1 Tube, Rfl: 1  •  atorvastatin (LIPITOR) 20 MG Tab, TAKE 1 TAB BY MOUTH EVERY DAY., Disp: 30 Tab, Rfl: 6  •  albuterol 108 (90 Base) MCG/ACT Aero Soln inhalation aerosol, Inhale 2 Puffs by mouth every 6 hours as needed for Shortness of Breath., Disp: 8.5 g, Rfl: 6  •  betamethasone dipropionate (DIPROLENE) 0.05 % Cream, Apply 1 Application to affected area(s) 2 times a day. Apply to wrist, Disp: 1 Tube, Rfl: 3  •  Blood Glucose Monitoring Suppl (TRUE METRIX METER) w/Device Kit, USE ONCE DAILY TO TEST BLOOD SUGAR, Disp: 1 Kit, Rfl: 0  •  TRUE METRIX BLOOD GLUCOSE TEST strip, USE TO TEST BLOOD GLUCOSE ONCE A DAY, Disp: 50 Strip, Rfl: 0  •  TRUEPLUS SAFETY LANCETS 28G Misc, , Disp: , Rfl:   •  aspirin (ASA) 81 MG Chew Tab chewable tablet, Take 1 Tab by mouth every day., Disp: 100 Tab, Rfl: 3  •  vitamin D, Ergocalciferol, (DRISDOL) 38927 units Cap capsule, Take 1 Cap by mouth every 28 days., Disp: 3 Cap, Rfl: 3  •  omeprazole (PRILOSEC) 20 MG Tablet Delayed Response delayed-release tablet, Take 1 Tab by mouth every day., Disp: 30 Tab, Rfl: 6    Labs: Reviewed    Physical Examination:  Vital signs: /70   Ht 1.524 m (5')   Wt 60.6 kg (133 lb 9.6 oz)   BMI 26.09 kg/m²  Body mass index is 26.09 kg/m².  General: No apparent distress, cooperative  Eyes: No scleral icterus or discharge  ENMT: Normal on external inspection of nose, lips, normal thyroid exam  Neck: No abnormal masses on inspection  Resp: Normal effort, clear to auscultation bilaterally   CVS:  Regular rate and rhythm, S1 S2 normal, no murmur   Extremities: No edema  Abdomen: abdominal obesity present  Neuro: Alert and oriented  Skin: No rash  Psych: Normal mood and affect, intact memory and able to make informed decisions    Assessment and Plan:  Pt doesn't take her second dose of metformin every day so I educated her on the importance of compliance with Metformin.   I'm keeping her on Jardiance 10 mg as her A1C and body weight looks good and there is no need for additional therapy right now. I would like her to stay on current dose of metformin (500mg BID).   Importance of foot care was explained and the need for at least one BG check a day to guide therapy was also explained.  Her goal until I see her next time was to stay on current therapy, diet and exercise routine.  Follow up in 2 months.  Thank you for allowing me to participate in the care of this patient.    Allison Walsh, PharmD  11/06/18    CC:   Keon Talamantes M.D.    This note was created using voice recognition software (Dragon). The accuracy of the dictation is limited by the abilities of the software. I have reviewed the note prior to signing, however some errors in grammar and context are still possible. If you have any questions related to this note please do not hesitate to contact our office.

## 2018-11-19 RX ORDER — LEVOTHYROXINE SODIUM 0.03 MG/1
25 TABLET ORAL
Qty: 30 TAB | Refills: 0 | Status: SHIPPED | OUTPATIENT
Start: 2018-11-19 | End: 2018-12-16 | Stop reason: SDUPTHER

## 2018-12-05 ENCOUNTER — HOSPITAL ENCOUNTER (OUTPATIENT)
Dept: RADIOLOGY | Facility: MEDICAL CENTER | Age: 58
End: 2018-12-05
Attending: FAMILY MEDICINE
Payer: MEDICARE

## 2018-12-05 DIAGNOSIS — E07.9 LESION OF THYROID GLAND: ICD-10-CM

## 2018-12-05 DIAGNOSIS — R93.89 THICKENED ENDOMETRIUM: ICD-10-CM

## 2018-12-05 DIAGNOSIS — E03.9 HYPOTHYROIDISM, UNSPECIFIED TYPE: ICD-10-CM

## 2018-12-05 DIAGNOSIS — N85.8 MASS OF UTERUS DETERMINED BY ULTRASOUND: ICD-10-CM

## 2018-12-05 DIAGNOSIS — Z12.31 SCREENING MAMMOGRAM, ENCOUNTER FOR: ICD-10-CM

## 2018-12-05 DIAGNOSIS — R10.2 PELVIC PAIN: ICD-10-CM

## 2018-12-05 PROCEDURE — 76830 TRANSVAGINAL US NON-OB: CPT

## 2018-12-05 PROCEDURE — 76536 US EXAM OF HEAD AND NECK: CPT

## 2018-12-05 PROCEDURE — 77067 SCR MAMMO BI INCL CAD: CPT

## 2018-12-05 RX ORDER — ERGOCALCIFEROL 1.25 MG/1
50000 CAPSULE ORAL
Qty: 30 CAP | Refills: 0 | Status: SHIPPED | OUTPATIENT
Start: 2018-12-05 | End: 2019-01-23 | Stop reason: SDUPTHER

## 2018-12-07 DIAGNOSIS — E78.5 DYSLIPIDEMIA: ICD-10-CM

## 2018-12-07 DIAGNOSIS — E11.8 TYPE 2 DIABETES MELLITUS WITH COMPLICATION, WITHOUT LONG-TERM CURRENT USE OF INSULIN (HCC): ICD-10-CM

## 2018-12-07 DIAGNOSIS — I10 ESSENTIAL HYPERTENSION: ICD-10-CM

## 2018-12-10 RX ORDER — EMPAGLIFLOZIN 10 MG/1
10 TABLET, FILM COATED ORAL DAILY
Qty: 30 TAB | Refills: 0 | Status: SHIPPED | OUTPATIENT
Start: 2018-12-10 | End: 2019-01-18 | Stop reason: SDUPTHER

## 2018-12-11 ENCOUNTER — OFFICE VISIT (OUTPATIENT)
Dept: MEDICAL GROUP | Facility: MEDICAL CENTER | Age: 58
End: 2018-12-11
Attending: FAMILY MEDICINE
Payer: MEDICARE

## 2018-12-11 VITALS
DIASTOLIC BLOOD PRESSURE: 80 MMHG | HEIGHT: 61 IN | HEART RATE: 86 BPM | TEMPERATURE: 98.6 F | RESPIRATION RATE: 14 BRPM | SYSTOLIC BLOOD PRESSURE: 135 MMHG | WEIGHT: 129 LBS | OXYGEN SATURATION: 97 % | BODY MASS INDEX: 24.35 KG/M2

## 2018-12-11 DIAGNOSIS — F39 MOOD DISORDER (HCC): ICD-10-CM

## 2018-12-11 DIAGNOSIS — E07.9 LESION OF THYROID GLAND: ICD-10-CM

## 2018-12-11 DIAGNOSIS — R93.89 THICKENED ENDOMETRIUM: ICD-10-CM

## 2018-12-11 PROCEDURE — 99214 OFFICE O/P EST MOD 30 MIN: CPT | Performed by: FAMILY MEDICINE

## 2018-12-11 PROCEDURE — 99213 OFFICE O/P EST LOW 20 MIN: CPT | Performed by: FAMILY MEDICINE

## 2018-12-11 ASSESSMENT — ENCOUNTER SYMPTOMS
FEVER: 0
SENSORY CHANGE: 0
FOCAL WEAKNESS: 0
SHORTNESS OF BREATH: 0
NERVOUS/ANXIOUS: 1
TINGLING: 1
INSOMNIA: 0
DEPRESSION: 1
CHILLS: 0
NAUSEA: 0
HALLUCINATIONS: 0
COUGH: 0
SPUTUM PRODUCTION: 0
SPEECH CHANGE: 0
ABDOMINAL PAIN: 0
VOMITING: 0
PALPITATIONS: 0
HEADACHES: 0
TREMORS: 0

## 2018-12-11 ASSESSMENT — LIFESTYLE VARIABLES: SUBSTANCE_ABUSE: 0

## 2018-12-11 NOTE — PROGRESS NOTES
Subjective:      Shahida Price is a 58 y.o. female who presents with Letter for School/Work ([employer] pet)            Patient 58-year-old female here for follow-up of her recent ultrasounds of her thyroid as well as ultrasound of her uterus.  The results are as follows:    1.  Ill-defined heterogeneous lesion in the RIGHT thyroid lobe which is hypoechoic to surrounding parenchyma, measures 10 x 11 x 18 mm.  This may indicate neoplasm or less likely focal thyroiditis.  2.  Stable tiny LEFT thyroid nodule.    She has been having tenderness over the right side of her thyroid which she states sometimes it is difficult for her to swallow.  A referral to endocrine has also been made for her to schedule an appointment for assistance in management and a further evaluation.  Discussed the possibility that this lesion may need to be biopsied.  We will continue to follow.    1.  Thick heterogeneous endometrium, abnormal for postmenopausal female.  Further evaluation of the endometrium is recommended.  2.  Mass in the LEFT uterine body may indicate fibroid, however other tumor is not excluded, particularly given abnormal appearance of endometrium.  3.  LEFT ovary is not visualized.    Discussed the need for an endometrial biopsy to further assess her thickened endometrial stripe.  A referral to GYN has been made for this procedure to be done.  We will continue to follow.    She will also need a letter written to her apartment  stating that she needs to keep  dogs to help keep her company as well as stabilize her mood.  Letter will be written today.  We will continue to follow.     Current medications, allergies, and problem list reviewed with patient and updated in EPIC.          Review of Systems   Constitutional: Negative for chills and fever.   HENT: Negative for hearing loss and tinnitus.    Respiratory: Negative for cough, sputum production and shortness of breath.    Cardiovascular: Negative for  "chest pain and palpitations.   Gastrointestinal: Negative for abdominal pain, nausea and vomiting.   Genitourinary: Negative.    Musculoskeletal: Positive for joint pain.   Skin: Negative for rash.   Neurological: Positive for tingling. Negative for tremors, sensory change, speech change, focal weakness and headaches.   Psychiatric/Behavioral: Positive for depression. Negative for hallucinations, substance abuse and suicidal ideas. The patient is nervous/anxious. The patient does not have insomnia.           Objective:     /80 (BP Location: Left arm, Patient Position: Sitting)   Pulse 86   Temp 37 °C (98.6 °F)   Resp 14   Ht 1.549 m (5' 1\")   Wt 58.5 kg (129 lb)   SpO2 97%   BMI 24.37 kg/m²      Physical Exam   Constitutional: She is oriented to person, place, and time.   BMI 24   HENT:   Head: Normocephalic and atraumatic.   Neck: Normal range of motion. Neck supple. Thyromegaly present.   Tenderness over R lobe of thyroid   Cardiovascular: Normal rate, regular rhythm and normal heart sounds.  Exam reveals no friction rub.    No murmur heard.  Pulmonary/Chest: Effort normal and breath sounds normal. No respiratory distress. She has no wheezes. She has no rales.   Abdominal: Soft. Bowel sounds are normal. She exhibits no distension. There is no tenderness.   Musculoskeletal: She exhibits tenderness.   Lymphadenopathy:     She has no cervical adenopathy.   Neurological: She is alert and oriented to person, place, and time.   Skin: Skin is warm and dry.   Psychiatric: She has a normal mood and affect. Her behavior is normal.   Nursing note and vitals reviewed.              Assessment/Plan:     1. Lesion of thyroid gland  We will have her referred to endocrine for a further evaluation of the thyroid lesion.  Reviewed the results of her recent soft tissue neck ultrasound.  We will continue to follow.    2. Thickened endometrium  Reviewed the results of her recent pelvic ultrasound.  It was noted that she " had a thickened endometrial lining assistance with her age.  A referral to GYN will be made for a possible endometrial biopsy.  We will continue to follow.    3. Mood disorder (HCC)  A letter has been written to her apartment  for her to be able to keep  pets with her.  To help stabilize her mood, as well as provide companionship.  We will continue to follow.

## 2018-12-11 NOTE — LETTER
December 11, 2018       Patient: Shahida Price   YOB: 1960   Date of Visit: 12/11/2018         To Whom It May Concern:    It is my medical opinion that Shahida Price will benefit from having her dogs as companions for company and to stabilize her mood.    If you have any questions or concerns, please don't hesitate to call [phone]          Sincerely,          Keon Talamantes M.D.  Electronically Signed

## 2018-12-12 DIAGNOSIS — E78.5 DYSLIPIDEMIA: ICD-10-CM

## 2018-12-12 DIAGNOSIS — E11.8 TYPE 2 DIABETES MELLITUS WITH COMPLICATION, WITHOUT LONG-TERM CURRENT USE OF INSULIN (HCC): ICD-10-CM

## 2018-12-12 DIAGNOSIS — I10 ESSENTIAL HYPERTENSION: ICD-10-CM

## 2018-12-12 RX ORDER — LISINOPRIL 10 MG/1
TABLET ORAL
Qty: 30 TAB | Refills: 0 | Status: SHIPPED | OUTPATIENT
Start: 2018-12-12 | End: 2019-01-23 | Stop reason: SDUPTHER

## 2018-12-17 RX ORDER — LEVOTHYROXINE SODIUM 0.03 MG/1
TABLET ORAL
Qty: 30 TAB | Refills: 0 | Status: SHIPPED | OUTPATIENT
Start: 2018-12-17 | End: 2019-01-23 | Stop reason: SDUPTHER

## 2018-12-31 ENCOUNTER — HOSPITAL ENCOUNTER (OUTPATIENT)
Dept: RADIOLOGY | Facility: MEDICAL CENTER | Age: 58
End: 2018-12-31

## 2019-01-18 DIAGNOSIS — I10 ESSENTIAL HYPERTENSION: ICD-10-CM

## 2019-01-18 DIAGNOSIS — E78.5 DYSLIPIDEMIA: ICD-10-CM

## 2019-01-18 DIAGNOSIS — E11.8 TYPE 2 DIABETES MELLITUS WITH COMPLICATION, WITHOUT LONG-TERM CURRENT USE OF INSULIN (HCC): ICD-10-CM

## 2019-01-21 RX ORDER — EMPAGLIFLOZIN 10 MG/1
TABLET, FILM COATED ORAL
Qty: 30 TAB | Refills: 3 | Status: SHIPPED | OUTPATIENT
Start: 2019-01-21 | End: 2019-05-30 | Stop reason: SDUPTHER

## 2019-01-23 ENCOUNTER — OFFICE VISIT (OUTPATIENT)
Dept: MEDICAL GROUP | Facility: MEDICAL CENTER | Age: 59
End: 2019-01-23
Attending: FAMILY MEDICINE
Payer: MEDICARE

## 2019-01-23 VITALS
TEMPERATURE: 96.5 F | OXYGEN SATURATION: 97 % | HEART RATE: 68 BPM | BODY MASS INDEX: 25.52 KG/M2 | SYSTOLIC BLOOD PRESSURE: 120 MMHG | DIASTOLIC BLOOD PRESSURE: 80 MMHG | RESPIRATION RATE: 14 BRPM | HEIGHT: 60 IN | WEIGHT: 130 LBS

## 2019-01-23 DIAGNOSIS — L98.9 BACK SKIN LESION: ICD-10-CM

## 2019-01-23 DIAGNOSIS — M25.569 KNEE PAIN, UNSPECIFIED CHRONICITY, UNSPECIFIED LATERALITY: ICD-10-CM

## 2019-01-23 DIAGNOSIS — R20.0 NUMBNESS AND TINGLING IN LEFT ARM: ICD-10-CM

## 2019-01-23 DIAGNOSIS — I10 ESSENTIAL HYPERTENSION: ICD-10-CM

## 2019-01-23 DIAGNOSIS — R20.2 NUMBNESS AND TINGLING IN LEFT ARM: ICD-10-CM

## 2019-01-23 DIAGNOSIS — E11.8 TYPE 2 DIABETES MELLITUS WITH COMPLICATION, WITHOUT LONG-TERM CURRENT USE OF INSULIN (HCC): ICD-10-CM

## 2019-01-23 DIAGNOSIS — E78.5 DYSLIPIDEMIA: ICD-10-CM

## 2019-01-23 PROCEDURE — 99214 OFFICE O/P EST MOD 30 MIN: CPT | Performed by: FAMILY MEDICINE

## 2019-01-23 PROCEDURE — 99212 OFFICE O/P EST SF 10 MIN: CPT | Performed by: FAMILY MEDICINE

## 2019-01-23 RX ORDER — ALBUTEROL SULFATE 90 UG/1
2 AEROSOL, METERED RESPIRATORY (INHALATION) EVERY 6 HOURS PRN
Qty: 8.5 G | Refills: 6 | Status: SHIPPED | OUTPATIENT
Start: 2019-01-23 | End: 2019-05-30 | Stop reason: SDUPTHER

## 2019-01-23 RX ORDER — LEVOTHYROXINE SODIUM 0.03 MG/1
25 TABLET ORAL EVERY MORNING
Qty: 90 TAB | Refills: 1 | Status: SHIPPED | OUTPATIENT
Start: 2019-01-23 | End: 2019-05-30 | Stop reason: SDUPTHER

## 2019-01-23 RX ORDER — ATORVASTATIN CALCIUM 20 MG/1
20 TABLET, FILM COATED ORAL
Qty: 90 TAB | Refills: 1 | Status: SHIPPED | OUTPATIENT
Start: 2019-01-23 | End: 2019-05-30 | Stop reason: SDUPTHER

## 2019-01-23 RX ORDER — GABAPENTIN 300 MG/1
300 CAPSULE ORAL 3 TIMES DAILY
Qty: 270 CAP | Refills: 1 | Status: SHIPPED | OUTPATIENT
Start: 2019-01-23 | End: 2019-05-30 | Stop reason: SDUPTHER

## 2019-01-23 RX ORDER — LISINOPRIL 10 MG/1
10 TABLET ORAL DAILY
Qty: 90 TAB | Refills: 1 | Status: SHIPPED | OUTPATIENT
Start: 2019-01-23 | End: 2019-05-30 | Stop reason: SDUPTHER

## 2019-01-23 RX ORDER — CYCLOBENZAPRINE HCL 10 MG
10 TABLET ORAL 3 TIMES DAILY PRN
Qty: 30 TAB | Refills: 3 | Status: SHIPPED | OUTPATIENT
Start: 2019-01-23 | End: 2019-05-30 | Stop reason: SDUPTHER

## 2019-01-23 RX ORDER — ASPIRIN 81 MG/1
81 TABLET, CHEWABLE ORAL DAILY
Qty: 100 TAB | Refills: 3 | Status: SHIPPED | OUTPATIENT
Start: 2019-01-23 | End: 2019-05-30 | Stop reason: SDUPTHER

## 2019-01-23 RX ORDER — ERGOCALCIFEROL 1.25 MG/1
50000 CAPSULE ORAL
Qty: 3 CAP | Refills: 3 | Status: SHIPPED | OUTPATIENT
Start: 2019-01-23 | End: 2019-05-30 | Stop reason: SDUPTHER

## 2019-01-23 ASSESSMENT — ENCOUNTER SYMPTOMS
EYES NEGATIVE: 1
FOCAL WEAKNESS: 0
PALPITATIONS: 0
SENSORY CHANGE: 0
HEADACHES: 0
ABDOMINAL PAIN: 0
CHILLS: 0
VOMITING: 0
TINGLING: 1
COUGH: 0
SPUTUM PRODUCTION: 0
SPEECH CHANGE: 0
NAUSEA: 0
ROS SKIN COMMENTS: SKIN LESION ON BACK
FEVER: 0
TREMORS: 0
PSYCHIATRIC NEGATIVE: 1
BACK PAIN: 1

## 2019-01-23 ASSESSMENT — PATIENT HEALTH QUESTIONNAIRE - PHQ9: CLINICAL INTERPRETATION OF PHQ2 SCORE: 0

## 2019-01-23 NOTE — PROGRESS NOTES
Subjective:      Shahida Price is a 58 y.o. female who presents with Follow-Up (referrals) and Nevus            Patient 58-year-old female here for follow-up of her hyperlipidemia, diabetes, hypertension, numbness and tingling, left knee pain, lesion on her back, and lesion in her thyroid.    Patient states she would need her medications refilled today.  We will send her medications and to be taken as previously directed.  She is not having any issues with her medications.  We will have her continue to check her blood sugars at home.  We will continue to follow.    Her last hemoglobin A1c was at 5.9.  We will have her continue to take her diabetic medications as directed.  She will also continue to check her feet on a regular basis, looking for any changes in sensation or skin breakdown.  She will also follow-up with her ophthalmologist for diabetic retinal exam.    She has a skin lesion on her back that has been bothering her for some time.  The lesion is pigmented but raised without any irregular margins.  Since the lesion is causing her some distress, and is pigmented and possibly growing in size a referral to dermatology has been made today.  We will continue to follow.    She is also been having pain in her left knee that has been progressively worsening.  She states that it really hurts when she bends down to  things.  She states that she also feels crepitus in her knee.  Will order an x-ray of her knee.  Discussed physical therapy as well as continuing over-the-counter anti-inflammatory medications as well as Tylenol.  We will continue to follow.    She had been seen by a specialist in [place] and was told that she should have a thyroidectomy.  She does have a suspicious lesion in her thyroid.  We will continue to follow.    She had also been seen by gynecology and was told that she should have a hysterectomy due to her uterine prolapse.  We will continue to follow.     Current medications, allergies,  and problem list reviewed with patient and updated in EPIC.        Review of Systems   Constitutional: Negative for chills and fever.   HENT: Negative for hearing loss.    Eyes: Negative.    Respiratory: Negative for cough and sputum production.    Cardiovascular: Negative for chest pain and palpitations.   Gastrointestinal: Negative for abdominal pain, nausea and vomiting.   Musculoskeletal: Positive for back pain and joint pain.   Skin: Positive for itching. Negative for rash.        Skin lesion on back   Neurological: Positive for tingling. Negative for tremors, sensory change, speech change, focal weakness and headaches.   Psychiatric/Behavioral: Negative.           Objective:     /80 (BP Location: Left arm, Patient Position: Sitting)   Pulse 68   Temp 35.8 °C (96.5 °F)   Resp 14   Ht 1.524 m (5')   Wt 59 kg (130 lb)   SpO2 97%   BMI 25.39 kg/m²      Physical Exam   Constitutional: She is oriented to person, place, and time. She appears well-developed and well-nourished.   HENT:   Head: Normocephalic and atraumatic.   Neck: Normal range of motion. Neck supple. No thyromegaly present.   Cardiovascular: Normal rate, regular rhythm and normal heart sounds.  Exam reveals no friction rub.    No murmur heard.  Pulmonary/Chest: Effort normal and breath sounds normal. No respiratory distress. She has no wheezes. She has no rales.   Abdominal: Soft. Bowel sounds are normal. She exhibits no distension. There is no tenderness.   Musculoskeletal: She exhibits tenderness. She exhibits no edema.   Decreased ROM of L knee   Lymphadenopathy:     She has no cervical adenopathy.   Neurological: She is alert and oriented to person, place, and time.   Skin: Skin is warm and dry.   Psychiatric: She has a normal mood and affect. Her behavior is normal.   Nursing note and vitals reviewed.              Assessment/Plan:     1. Dyslipidemia  We will have her continue to use her cholesterol medication as previously directed.  She has been advised to increase the fibers in his diet, avoid fatty/fried foods and try fish oil supplements if not allergic to seafood. Also advised to exercise as tolerated.     - lisinopril (PRINIVIL) 10 MG Tab; Take 1 Tab by mouth every day.  Dispense: 90 Tab; Refill: 1  - atorvastatin (LIPITOR) 20 MG Tab; Take 1 Tab by mouth every day.  Dispense: 90 Tab; Refill: 1  - aspirin (ASA) 81 MG Chew Tab chewable tablet; Take 1 Tab by mouth every day.  Dispense: 100 Tab; Refill: 3    2. Type 2 diabetes mellitus with complication, without long-term current use of insulin (HCC)  We will have her continue to use her diabetic medications as previously directed.  Her last hemoglobin A1c was at 5.9.  We will have her check her feet daily for any skin breakdown or changes in sensation.  She will also follow-up with ophthalmology for diabetic retinal exams.  - lisinopril (PRINIVIL) 10 MG Tab; Take 1 Tab by mouth every day.  Dispense: 90 Tab; Refill: 1  - atorvastatin (LIPITOR) 20 MG Tab; Take 1 Tab by mouth every day.  Dispense: 90 Tab; Refill: 1  - aspirin (ASA) 81 MG Chew Tab chewable tablet; Take 1 Tab by mouth every day.  Dispense: 100 Tab; Refill: 3    3. Essential hypertension  She will continue to take her medication for her blood pressure as directed. She has been advised to monitor blood pressure at home and keep notes. If blood pressure elevated or having symptoms of CP, SOB or neurologic changes to go to the er.    - lisinopril (PRINIVIL) 10 MG Tab; Take 1 Tab by mouth every day.  Dispense: 90 Tab; Refill: 1  - atorvastatin (LIPITOR) 20 MG Tab; Take 1 Tab by mouth every day.  Dispense: 90 Tab; Refill: 1  - aspirin (ASA) 81 MG Chew Tab chewable tablet; Take 1 Tab by mouth every day.  Dispense: 100 Tab; Refill: 3    4. Numbness and tingling in left arm  She will continue to take her gabapentin as directed.  We will continue to follow.  - gabapentin (NEURONTIN) 300 MG Cap; Take 1 Cap by mouth 3 times a day.  Dispense:  270 Cap; Refill: 1  - cyclobenzaprine (FLEXERIL) 10 MG Tab; Take 1 Tab by mouth 3 times a day as needed.  Dispense: 30 Tab; Refill: 3    5. Knee pain, unspecified chronicity, unspecified laterality  She will get an x-ray of her knee and use over-the-counter anti-inflammatory medications as well as Tylenol.  We will continue to follow.  - DX-KNEE 3 VIEWS LEFT; Future

## 2019-03-28 ENCOUNTER — APPOINTMENT (RX ONLY)
Dept: URBAN - METROPOLITAN AREA CLINIC 4 | Facility: CLINIC | Age: 59
Setting detail: DERMATOLOGY
End: 2019-03-28

## 2019-03-28 DIAGNOSIS — L91.8 OTHER HYPERTROPHIC DISORDERS OF THE SKIN: ICD-10-CM

## 2019-03-28 DIAGNOSIS — L82.0 INFLAMED SEBORRHEIC KERATOSIS: ICD-10-CM

## 2019-03-28 PROBLEM — E78.5 HYPERLIPIDEMIA, UNSPECIFIED: Status: ACTIVE | Noted: 2019-03-28

## 2019-03-28 PROBLEM — E13.9 OTHER SPECIFIED DIABETES MELLITUS WITHOUT COMPLICATIONS: Status: ACTIVE | Noted: 2019-03-28

## 2019-03-28 PROBLEM — I10 ESSENTIAL (PRIMARY) HYPERTENSION: Status: ACTIVE | Noted: 2019-03-28

## 2019-03-28 PROBLEM — E05.90 THYROTOXICOSIS, UNSPECIFIED WITHOUT THYROTOXIC CRISIS OR STORM: Status: ACTIVE | Noted: 2019-03-28

## 2019-03-28 PROCEDURE — 99202 OFFICE O/P NEW SF 15 MIN: CPT | Mod: 25

## 2019-03-28 PROCEDURE — ? BIOPSY BY SHAVE METHOD

## 2019-03-28 PROCEDURE — 11102 TANGNTL BX SKIN SINGLE LES: CPT

## 2019-03-28 ASSESSMENT — LOCATION SIMPLE DESCRIPTION DERM
LOCATION SIMPLE: RIGHT UPPER BACK
LOCATION SIMPLE: ABDOMEN
LOCATION SIMPLE: RIGHT UPPER BACK
LOCATION SIMPLE: LEFT ANTERIOR NECK

## 2019-03-28 ASSESSMENT — LOCATION DETAILED DESCRIPTION DERM
LOCATION DETAILED: RIGHT MID-UPPER BACK
LOCATION DETAILED: LEFT LATERAL ABDOMEN
LOCATION DETAILED: RIGHT MID-UPPER BACK
LOCATION DETAILED: LEFT SUPERIOR LATERAL NECK

## 2019-03-28 ASSESSMENT — LOCATION ZONE DERM
LOCATION ZONE: TRUNK
LOCATION ZONE: NECK
LOCATION ZONE: TRUNK

## 2019-03-28 NOTE — PROCEDURE: BIOPSY BY SHAVE METHOD
Size Of Lesion In Cm: 0
Destruction After The Procedure: No
Anesthesia Type: 1% lidocaine with epinephrine
Cryotherapy Text: The wound bed was treated with cryotherapy after the biopsy was performed.
Curettage Text: The wound bed was treated with curettage after the biopsy was performed.
Notification Instructions: Patient will be notified of biopsy results. However, patient instructed to call the office if not contacted within 2 weeks.
Lab: [id number]
Consent: Written consent was obtained and risks were reviewed including but not limited to scarring, infection, bleeding, scabbing, incomplete removal, nerve damage and allergy to anesthesia.
Electrodesiccation Text: The wound bed was treated with electrodesiccation after the biopsy was performed.
Lab Facility: 
Wound Care: Petrolatum
Depth Of Biopsy: dermis
Electrodesiccation And Curettage Text: The wound bed was treated with electrodesiccation and curettage after the biopsy was performed.
Billing Type: Third-Party Bill
Biopsy Type: H and E
Type Of Destruction Used: Curettage
Anesthesia Volume In Cc: 0.5
Biopsy Method: Dermablade
Hemostasis: Drysol
Detail Level: Detailed
Dressing: bandage
Was A Bandage Applied: Yes
Post-Care Instructions: I reviewed with the patient in detail post-care instructions. Patient is to keep the biopsy site dry overnight, and then apply bacitracin twice daily until healed. Patient may apply hydrogen peroxide soaks to remove any crusting.
Silver Nitrate Text: The wound bed was treated with silver nitrate after the biopsy was performed.

## 2019-05-30 ENCOUNTER — OFFICE VISIT (OUTPATIENT)
Dept: MEDICAL GROUP | Facility: MEDICAL CENTER | Age: 59
End: 2019-05-30
Attending: FAMILY MEDICINE
Payer: MEDICARE

## 2019-05-30 VITALS
HEIGHT: 61 IN | SYSTOLIC BLOOD PRESSURE: 120 MMHG | OXYGEN SATURATION: 97 % | DIASTOLIC BLOOD PRESSURE: 80 MMHG | BODY MASS INDEX: 24.73 KG/M2 | HEART RATE: 70 BPM | WEIGHT: 131 LBS | TEMPERATURE: 97.6 F | RESPIRATION RATE: 14 BRPM

## 2019-05-30 DIAGNOSIS — R20.2 NUMBNESS AND TINGLING IN LEFT ARM: ICD-10-CM

## 2019-05-30 DIAGNOSIS — E78.5 DYSLIPIDEMIA: ICD-10-CM

## 2019-05-30 DIAGNOSIS — E11.8 TYPE 2 DIABETES MELLITUS WITH COMPLICATION, WITHOUT LONG-TERM CURRENT USE OF INSULIN (HCC): ICD-10-CM

## 2019-05-30 DIAGNOSIS — R20.0 NUMBNESS AND TINGLING IN LEFT ARM: ICD-10-CM

## 2019-05-30 DIAGNOSIS — L30.9 ECZEMA, UNSPECIFIED TYPE: ICD-10-CM

## 2019-05-30 DIAGNOSIS — J32.9 RHINOSINUSITIS: ICD-10-CM

## 2019-05-30 DIAGNOSIS — I10 ESSENTIAL HYPERTENSION: ICD-10-CM

## 2019-05-30 PROCEDURE — 99214 OFFICE O/P EST MOD 30 MIN: CPT | Performed by: FAMILY MEDICINE

## 2019-05-30 PROCEDURE — 99213 OFFICE O/P EST LOW 20 MIN: CPT | Performed by: FAMILY MEDICINE

## 2019-05-30 RX ORDER — TRIAMCINOLONE ACETONIDE 1 MG/G
1 OINTMENT TOPICAL 2 TIMES DAILY PRN
Qty: 1 TUBE | Refills: 0 | Status: SHIPPED | OUTPATIENT
Start: 2019-05-30 | End: 2021-01-05 | Stop reason: SDUPTHER

## 2019-05-30 RX ORDER — GABAPENTIN 300 MG/1
300 CAPSULE ORAL 3 TIMES DAILY
Qty: 270 CAP | Refills: 1 | Status: SHIPPED | OUTPATIENT
Start: 2019-05-30 | End: 2021-01-05

## 2019-05-30 RX ORDER — ASPIRIN 81 MG/1
81 TABLET, CHEWABLE ORAL DAILY
Qty: 100 TAB | Refills: 3 | Status: SHIPPED | OUTPATIENT
Start: 2019-05-30

## 2019-05-30 RX ORDER — ERGOCALCIFEROL 1.25 MG/1
50000 CAPSULE ORAL
Qty: 3 CAP | Refills: 3 | Status: SHIPPED | OUTPATIENT
Start: 2019-05-30 | End: 2021-05-27

## 2019-05-30 RX ORDER — LISINOPRIL 10 MG/1
10 TABLET ORAL DAILY
Qty: 90 TAB | Refills: 1 | Status: SHIPPED | OUTPATIENT
Start: 2019-05-30 | End: 2020-03-19

## 2019-05-30 RX ORDER — ALBUTEROL SULFATE 90 UG/1
2 AEROSOL, METERED RESPIRATORY (INHALATION) EVERY 6 HOURS PRN
Qty: 8.5 G | Refills: 6 | Status: SHIPPED | OUTPATIENT
Start: 2019-05-30 | End: 2020-03-02 | Stop reason: SDUPTHER

## 2019-05-30 RX ORDER — AMMONIUM LACTATE 12 G/100G
1 LOTION TOPICAL PRN
Qty: 1 BOTTLE | Refills: 3 | Status: SHIPPED | OUTPATIENT
Start: 2019-05-30 | End: 2020-03-02

## 2019-05-30 RX ORDER — ATORVASTATIN CALCIUM 20 MG/1
20 TABLET, FILM COATED ORAL
Qty: 90 TAB | Refills: 1 | Status: SHIPPED | OUTPATIENT
Start: 2019-05-30 | End: 2019-12-11 | Stop reason: SDUPTHER

## 2019-05-30 RX ORDER — FLUTICASONE PROPIONATE 50 MCG
2 SPRAY, SUSPENSION (ML) NASAL DAILY
Qty: 16 G | Refills: 11 | Status: SHIPPED | OUTPATIENT
Start: 2019-05-30 | End: 2021-01-05 | Stop reason: SDUPTHER

## 2019-05-30 RX ORDER — LEVOTHYROXINE SODIUM 0.03 MG/1
25 TABLET ORAL EVERY MORNING
Qty: 90 TAB | Refills: 1 | Status: SHIPPED | OUTPATIENT
Start: 2019-05-30 | End: 2020-03-02 | Stop reason: SDUPTHER

## 2019-05-30 RX ORDER — CYCLOBENZAPRINE HCL 10 MG
10 TABLET ORAL 3 TIMES DAILY PRN
Qty: 30 TAB | Refills: 3 | Status: SHIPPED | OUTPATIENT
Start: 2019-05-30 | End: 2021-10-28

## 2019-05-30 ASSESSMENT — ENCOUNTER SYMPTOMS
NEUROLOGICAL NEGATIVE: 1
VOMITING: 0
SPUTUM PRODUCTION: 0
RHINORRHEA: 0
CHILLS: 0
SHORTNESS OF BREATH: 0
COUGH: 0
EYE PAIN: 0
PALPITATIONS: 0
DIARRHEA: 0
ANOREXIA: 0
ABDOMINAL PAIN: 0
NAIL CHANGES: 0
FATIGUE: 0
FEVER: 0
PSYCHIATRIC NEGATIVE: 1
NAUSEA: 0
SORE THROAT: 0

## 2019-05-30 NOTE — PROGRESS NOTES
Subjective:      Shahida Price is a 58 y.o. female who presents with Rash            Patient 58-year-old female here for a rash that has worsened over the last few weeks.  The rash is present over her abdomen, and legs bilaterally.  She will also need medication refills for her hypertension, hyperlipidemia, diabetes, neuropathy and allergic rhinitis.    Her medication refills will be sent to her pharmacy for her to continue using as directed.  She is not having any issues with her medications.  We will continue to follow.      Rash   This is a recurrent problem. The current episode started 1 to 4 weeks ago. The problem is unchanged. The affected locations include the abdomen, left lower leg and right lower leg. The rash is characterized by itchiness and redness. She was exposed to nothing. Pertinent negatives include no anorexia, congestion, cough, diarrhea, eye pain, facial edema, fatigue, fever, joint pain, nail changes, rhinorrhea, shortness of breath, sore throat or vomiting. Past treatments include nothing (We will have her try triamcinolone ointment as directed, and a moisturizer cream after showers to help minimize her rash.  Discussed possible dermatology referral if her rash persists).       Review of Systems   Constitutional: Negative for chills, fatigue and fever.   HENT: Negative for congestion, hearing loss, rhinorrhea, sore throat and tinnitus.    Eyes: Negative for pain.   Respiratory: Negative for cough, sputum production and shortness of breath.    Cardiovascular: Negative for chest pain and palpitations.   Gastrointestinal: Negative for abdominal pain, anorexia, diarrhea, nausea and vomiting.   Musculoskeletal: Negative for joint pain.   Skin: Positive for itching and rash. Negative for nail changes.   Neurological: Negative.    Psychiatric/Behavioral: Negative.           Objective:     /80 (BP Location: Left arm, Patient Position: Sitting)   Pulse 70   Temp 36.4 °C (97.6 °F)   Resp 14   Ht  "1.549 m (5' 1\")   Wt 59.4 kg (131 lb)   SpO2 97%   BMI 24.75 kg/m²      Physical Exam   Constitutional: She is oriented to person, place, and time.   BMI 24.75   HENT:   Head: Normocephalic and atraumatic.   Cardiovascular: Normal rate, regular rhythm and normal heart sounds.  Exam reveals no friction rub.    No murmur heard.  Pulmonary/Chest: Effort normal and breath sounds normal. No respiratory distress. She has no wheezes. She has no rales.   Abdominal: Soft. Bowel sounds are normal. She exhibits no distension. There is no tenderness.   Neurological: She is alert and oriented to person, place, and time.   Skin: Skin is warm and dry. Rash noted. There is erythema.   Maculopapular rash on abdomen, right and left lower extremity   Psychiatric: She has a normal mood and affect. Her behavior is normal.   Nursing note and vitals reviewed.              Assessment/Plan:     1. Eczema, unspecified type  We will have her try triamcinolone ointment as needed for her rash, will also have her use moisturizers after her showers and as needed.  Discussed dermatology if her rash persists.  We will continue to follow.  - triamcinolone acetonide (KENALOG) 0.1 % Ointment; Apply 1 Application to affected area(s) 2 times a day as needed.  Dispense: 1 Tube; Refill: 0  - ammonium lactate (LAC-HYDRIN) 12 % Lotion; Apply 1 Application to affected area(s) as needed.  Dispense: 1 Bottle; Refill: 3    2. Dyslipidemia  We will have her continue to use her medication as directed refills will be sent to her pharmacy.  Also will have her avoid fatty and fried foods and increase her fiber intake.  Advise exercising as tolerated 4-5 times weekly for 20 to 30 minutes at a time.  - lisinopril (PRINIVIL) 10 MG Tab; Take 1 Tab by mouth every day.  Dispense: 90 Tab; Refill: 1  - atorvastatin (LIPITOR) 20 MG Tab; Take 1 Tab by mouth every day.  Dispense: 90 Tab; Refill: 1  - aspirin (ASA) 81 MG Chew Tab chewable tablet; Take 1 Tab by mouth every " day.  Dispense: 100 Tab; Refill: 3  - Empagliflozin (JARDIANCE) 10 MG Tab; Take 1 Tab by mouth every day. TAKE 1 TAB BY MOUTH EVERY DAY.  Dispense: 30 Tab; Refill: 3    3. Type 2 diabetes mellitus with complication, without long-term current use of insulin (HCC)  We will have her continue using her diabetic medications as directed.  We will also have her check her feet daily for any skin breakdown or changes in sensation.  We will continue to follow.  - lisinopril (PRINIVIL) 10 MG Tab; Take 1 Tab by mouth every day.  Dispense: 90 Tab; Refill: 1  - atorvastatin (LIPITOR) 20 MG Tab; Take 1 Tab by mouth every day.  Dispense: 90 Tab; Refill: 1  - aspirin (ASA) 81 MG Chew Tab chewable tablet; Take 1 Tab by mouth every day.  Dispense: 100 Tab; Refill: 3  - Empagliflozin (JARDIANCE) 10 MG Tab; Take 1 Tab by mouth every day. TAKE 1 TAB BY MOUTH EVERY DAY.  Dispense: 30 Tab; Refill: 3    4. Essential hypertension  We will have her continue using her blood pressure medication as directed.  We will also have her check her blood pressures at home and keep notes.  - lisinopril (PRINIVIL) 10 MG Tab; Take 1 Tab by mouth every day.  Dispense: 90 Tab; Refill: 1  - atorvastatin (LIPITOR) 20 MG Tab; Take 1 Tab by mouth every day.  Dispense: 90 Tab; Refill: 1  - aspirin (ASA) 81 MG Chew Tab chewable tablet; Take 1 Tab by mouth every day.  Dispense: 100 Tab; Refill: 3  - Empagliflozin (JARDIANCE) 10 MG Tab; Take 1 Tab by mouth every day. TAKE 1 TAB BY MOUTH EVERY DAY.  Dispense: 30 Tab; Refill: 3    5. Numbness and tingling in left arm  See above plan.  We will have her use her gabapentin as directed.  We will continue to follow.  - gabapentin (NEURONTIN) 300 MG Cap; Take 1 Cap by mouth 3 times a day.  Dispense: 270 Cap; Refill: 1  - cyclobenzaprine (FLEXERIL) 10 MG Tab; Take 1 Tab by mouth 3 times a day as needed.  Dispense: 30 Tab; Refill: 3    6. Rhinosinusitis  We will have her use her Flonase as directed.  We will continue to  follow.  - fluticasone (FLONASE) 50 MCG/ACT nasal spray; Spray 2 Sprays in nose every day.  Dispense: 16 g; Refill: 11

## 2019-06-12 ENCOUNTER — TELEPHONE (OUTPATIENT)
Dept: MEDICAL GROUP | Facility: MEDICAL CENTER | Age: 59
End: 2019-06-12

## 2019-06-12 DIAGNOSIS — E11.40 CONTROLLED TYPE 2 DIABETES MELLITUS WITH DIABETIC NEUROPATHY, WITHOUT LONG-TERM CURRENT USE OF INSULIN (HCC): ICD-10-CM

## 2019-06-12 NOTE — TELEPHONE ENCOUNTER
"1. Caller Name: Shahida Price                                           Call Back Number: [phone] (home)         Patient approves a detailed voicemail message: N\A    Pharmacy is requesting a new prescription be sent for test strips that includes day supply, icd 10 and has the sig \"test blood sugar once daily.\" Please advise.  "

## 2019-07-23 DIAGNOSIS — E11.8 DM (DIABETES MELLITUS) WITH COMPLICATIONS (HCC): Primary | ICD-10-CM

## 2019-07-25 ENCOUNTER — APPOINTMENT (RX ONLY)
Dept: URBAN - METROPOLITAN AREA CLINIC 4 | Facility: CLINIC | Age: 59
Setting detail: DERMATOLOGY
End: 2019-07-25

## 2019-07-25 DIAGNOSIS — L64.8 OTHER ANDROGENIC ALOPECIA: ICD-10-CM

## 2019-07-25 DIAGNOSIS — D22 MELANOCYTIC NEVI: ICD-10-CM

## 2019-07-25 DIAGNOSIS — L82.1 OTHER SEBORRHEIC KERATOSIS: ICD-10-CM

## 2019-07-25 DIAGNOSIS — L20.89 OTHER ATOPIC DERMATITIS: ICD-10-CM

## 2019-07-25 PROBLEM — L20.84 INTRINSIC (ALLERGIC) ECZEMA: Status: ACTIVE | Noted: 2019-07-25

## 2019-07-25 PROBLEM — D22.5 MELANOCYTIC NEVI OF TRUNK: Status: ACTIVE | Noted: 2019-07-25

## 2019-07-25 PROCEDURE — ? COUNSELING

## 2019-07-25 PROCEDURE — ? ADDITIONAL NOTES

## 2019-07-25 PROCEDURE — 99214 OFFICE O/P EST MOD 30 MIN: CPT

## 2019-07-25 ASSESSMENT — LOCATION DETAILED DESCRIPTION DERM
LOCATION DETAILED: RIGHT SUPERIOR LATERAL MIDBACK
LOCATION DETAILED: LEFT MEDIAL FRONTAL SCALP
LOCATION DETAILED: INFERIOR THORACIC SPINE

## 2019-07-25 ASSESSMENT — LOCATION SIMPLE DESCRIPTION DERM
LOCATION SIMPLE: RIGHT LOWER BACK
LOCATION SIMPLE: UPPER BACK
LOCATION SIMPLE: LEFT SCALP

## 2019-07-25 ASSESSMENT — LOCATION ZONE DERM
LOCATION ZONE: TRUNK
LOCATION ZONE: SCALP

## 2019-07-25 NOTE — HPI: EVALUATION OF SKIN LESION(S)
What Type Of Note Output Would You Prefer (Optional)?: Standard Output
How Severe Are Your Spot(S)?: mild
Hpi Title: Evaluation of a Skin Lesion

## 2019-07-25 NOTE — HPI: SECONDARY COMPLAINT
How Severe Is This Condition?: mild
Additional History: Is healing well after she has been applying cream that she was given by pcp

## 2019-07-25 NOTE — PROCEDURE: ADDITIONAL NOTES
Additional Notes: Discussed with patient that since she is having bloodwork tomorrow she is given our fax number for the results to be sent to our office also.
Detail Level: Simple

## 2019-07-26 ENCOUNTER — HOSPITAL ENCOUNTER (OUTPATIENT)
Dept: LAB | Facility: MEDICAL CENTER | Age: 59
End: 2019-07-26
Attending: FAMILY MEDICINE
Payer: MEDICARE

## 2019-07-26 DIAGNOSIS — E11.8 DM (DIABETES MELLITUS) WITH COMPLICATIONS (HCC): ICD-10-CM

## 2019-07-26 LAB
ALBUMIN SERPL BCP-MCNC: 4.3 G/DL (ref 3.2–4.9)
ALBUMIN/GLOB SERPL: 1.3 G/DL
ALP SERPL-CCNC: 92 U/L (ref 30–99)
ALT SERPL-CCNC: 25 U/L (ref 2–50)
ANION GAP SERPL CALC-SCNC: 8 MMOL/L (ref 0–11.9)
AST SERPL-CCNC: 19 U/L (ref 12–45)
BILIRUB SERPL-MCNC: 0.4 MG/DL (ref 0.1–1.5)
BUN SERPL-MCNC: 19 MG/DL (ref 8–22)
CALCIUM SERPL-MCNC: 9 MG/DL (ref 8.5–10.5)
CHLORIDE SERPL-SCNC: 105 MMOL/L (ref 96–112)
CHOLEST SERPL-MCNC: 121 MG/DL (ref 100–199)
CO2 SERPL-SCNC: 26 MMOL/L (ref 20–33)
CREAT SERPL-MCNC: 0.71 MG/DL (ref 0.5–1.4)
EST. AVERAGE GLUCOSE BLD GHB EST-MCNC: 126 MG/DL
FASTING STATUS PATIENT QL REPORTED: NORMAL
GLOBULIN SER CALC-MCNC: 3.4 G/DL (ref 1.9–3.5)
GLUCOSE SERPL-MCNC: 117 MG/DL (ref 65–99)
HBA1C MFR BLD: 6 % (ref 0–5.6)
HDLC SERPL-MCNC: 47 MG/DL
LDLC SERPL CALC-MCNC: 60 MG/DL
POTASSIUM SERPL-SCNC: 4.3 MMOL/L (ref 3.6–5.5)
PROT SERPL-MCNC: 7.7 G/DL (ref 6–8.2)
SODIUM SERPL-SCNC: 139 MMOL/L (ref 135–145)
TRIGL SERPL-MCNC: 68 MG/DL (ref 0–149)

## 2019-07-26 PROCEDURE — 36415 COLL VENOUS BLD VENIPUNCTURE: CPT

## 2019-07-26 PROCEDURE — 80061 LIPID PANEL: CPT

## 2019-07-26 PROCEDURE — 83036 HEMOGLOBIN GLYCOSYLATED A1C: CPT | Mod: GA

## 2019-07-26 PROCEDURE — 80053 COMPREHEN METABOLIC PANEL: CPT

## 2019-08-13 ENCOUNTER — NON-PROVIDER VISIT (OUTPATIENT)
Dept: MEDICAL GROUP | Facility: MEDICAL CENTER | Age: 59
End: 2019-08-13
Attending: FAMILY MEDICINE
Payer: MEDICARE

## 2019-08-13 VITALS
HEART RATE: 83 BPM | DIASTOLIC BLOOD PRESSURE: 67 MMHG | SYSTOLIC BLOOD PRESSURE: 105 MMHG | WEIGHT: 130 LBS | BODY MASS INDEX: 24.56 KG/M2

## 2019-08-13 DIAGNOSIS — E78.5 DYSLIPIDEMIA: ICD-10-CM

## 2019-08-13 DIAGNOSIS — E11.8 TYPE 2 DIABETES MELLITUS WITH COMPLICATION, WITHOUT LONG-TERM CURRENT USE OF INSULIN (HCC): ICD-10-CM

## 2019-08-13 DIAGNOSIS — I10 ESSENTIAL HYPERTENSION: ICD-10-CM

## 2019-08-13 PROCEDURE — 99212 OFFICE O/P EST SF 10 MIN: CPT | Performed by: FAMILY MEDICINE

## 2019-08-13 NOTE — NON-PROVIDER
New Patient Consult Note 3:10-3:40PM  Primary care physician: Keon Talamantes M.D.    Reason for consult: Management of Controlled Type 2 Diabetes    HPI:  Shahida Price is a 59 y.o. old patient who comes in today for evaluation of above stated problem.    Pt reports no interval changes from last visit. Denies blurry vision; last retinal exam in January 2019. Pt does experience some tingling on fingers of her left hand.    She continues to do well with portion control and goes on walks daily.  Pt has been tolerating medications well; denies N/V or GI upset.    eGFR wnl, A1C slightly increased from 5.9 to 6.0. Lipid panel improved     Most Recent HbA1c:   Lab Results   Component Value Date/Time    HBA1C 6.0 (H) 07/26/2019 09:18 AM        Current Diabetes Regimen:  SGLT-2 Inhibitor:  Empagliflozin 10 mg once daily   Other: Metformin 500 mg BID    Before Breakfast:     Hypoglycemia:  None      ROS:  Constitutional: No weight loss  Cardiac: No palpitations or racing heart  Resp: No shortness of breath  Neuro: No numbness or tinging in feet  Endo: No heat or cold intolerance, no polyuria or polydipsia  All other systems were reviewed and were negative.    Past Medical History:  Patient Active Problem List    Diagnosis Date Noted   • Chest pain 08/17/2017     Priority: High   • Essential hypertension 04/17/2017     Priority: Medium   • Mood disorder (HCC) 04/17/2017     Priority: Medium   • Type 2 diabetes mellitus (HCC) 04/17/2017     Priority: Low   • Dyslipidemia 04/17/2017     Priority: Low   • Hemorrhoids 12/19/2017   • History of gastritis 04/17/2017   • Atypical chest pain 04/17/2017   • Obesity 04/17/2017   • Flexural eczema 04/17/2017   • Hair loss 04/17/2017   • Adhesive capsulitis of both shoulders 04/17/2017       Past Surgical History:  Past Surgical History:   Procedure Laterality Date   • ZZZ CARDIAC CATH  04/25/2018    normal coronaires   • CHOLECYSTECTOMY  2007   • OTHER ORTHOPEDIC SURGERY  2007     shoulder surgery       Allergies:  Shellfish allergy    Social History:  Social History     Socioeconomic History   • Marital status:      Spouse name: Not on file   • Number of children: Not on file   • Years of education: Not on file   • Highest education level: Not on file   Occupational History   • Not on file   Social Needs   • Financial resource strain: Not on file   • Food insecurity:     Worry: Not on file     Inability: Not on file   • Transportation needs:     Medical: Not on file     Non-medical: Not on file   Tobacco Use   • Smoking status: Former Smoker     Packs/day: 1.00     Years: 15.00     Pack years: 15.00     Last attempt to quit: 2004     Years since quitting: 15.6   • Smokeless tobacco: Never Used   Substance and Sexual Activity   • Alcohol use: No     Comment: 1 beer / month   • Drug use: No   • Sexual activity: Yes     Partners: Male     Comment: menopause 2015   Lifestyle   • Physical activity:     Days per week: Not on file     Minutes per session: Not on file   • Stress: Not on file   Relationships   • Social connections:     Talks on phone: Not on file     Gets together: Not on file     Attends [employer] service: Not on file     Active member of club or organization: Not on file     Attends meetings of clubs or organizations: Not on file     Relationship status: Not on file   • Intimate partner violence:     Fear of current or ex partner: Not on file     Emotionally abused: Not on file     Physically abused: Not on file     Forced sexual activity: Not on file   Other Topics Concern   • Not on file   Social History Narrative   • Not on file       Family History:  Family History   Problem Relation Age of Onset   • Cancer Mother         lung cancer, chronic smoker   • Lung Disease Mother    • Heart Attack Mother    • Diabetes Father        Medications:    Current Outpatient Medications:   •  glucose blood (TRUE METRIX BLOOD GLUCOSE TEST) strip, Test blood sugars once daily, Disp: 50  Strip, Rfl: 11  •  triamcinolone acetonide (KENALOG) 0.1 % Ointment, Apply 1 Application to affected area(s) 2 times a day as needed., Disp: 1 Tube, Rfl: 0  •  ammonium lactate (LAC-HYDRIN) 12 % Lotion, Apply 1 Application to affected area(s) as needed., Disp: 1 Bottle, Rfl: 3  •  lisinopril (PRINIVIL) 10 MG Tab, Take 1 Tab by mouth every day., Disp: 90 Tab, Rfl: 1  •  vitamin D, Ergocalciferol, (DRISDOL) 88349 units Cap capsule, Take 1 Cap by mouth every 28 days., Disp: 3 Cap, Rfl: 3  •  levothyroxine (SYNTHROID) 25 MCG Tab, Take 1 Tab by mouth every morning. ON A EMPTY STOMACH, Disp: 90 Tab, Rfl: 1  •  gabapentin (NEURONTIN) 300 MG Cap, Take 1 Cap by mouth 3 times a day., Disp: 270 Cap, Rfl: 1  •  cyclobenzaprine (FLEXERIL) 10 MG Tab, Take 1 Tab by mouth 3 times a day as needed., Disp: 30 Tab, Rfl: 3  •  atorvastatin (LIPITOR) 20 MG Tab, Take 1 Tab by mouth every day., Disp: 90 Tab, Rfl: 1  •  albuterol 108 (90 Base) MCG/ACT Aero Soln inhalation aerosol, Inhale 2 Puffs by mouth every 6 hours as needed for Shortness of Breath., Disp: 8.5 g, Rfl: 6  •  aspirin (ASA) 81 MG Chew Tab chewable tablet, Take 1 Tab by mouth every day., Disp: 100 Tab, Rfl: 3  •  fluticasone (FLONASE) 50 MCG/ACT nasal spray, Spray 2 Sprays in nose every day., Disp: 16 g, Rfl: 11  •  Empagliflozin (JARDIANCE) 10 MG Tab, Take 1 Tab by mouth every day. TAKE 1 TAB BY MOUTH EVERY DAY., Disp: 30 Tab, Rfl: 3  •  metFORMIN (GLUCOPHAGE) 500 MG Tab, TOME MEGGAN TABLETA DOS VECES AL ROLAND CON LAS COMIDAS, Disp: 180 Tab, Rfl: 0  •  Blood Glucose Monitoring Suppl (TRUE METRIX METER) w/Device Kit, USE ONCE DAILY TO TEST BLOOD SUGAR, Disp: 1 Kit, Rfl: 0  •  TRUEPLUS SAFETY LANCETS 28G Misc, , Disp: , Rfl:   •  omeprazole (PRILOSEC) 20 MG Tablet Delayed Response delayed-release tablet, Take 1 Tab by mouth every day., Disp: 30 Tab, Rfl: 6    Labs: Reviewed    Physical Examination:  Vital signs: /67   Pulse 83   Wt 59 kg (130 lb)   BMI 24.56 kg/m²  Body  mass index is 24.56 kg/m².  General: No apparent distress, cooperative  Eyes: No scleral icterus or discharge  ENMT: Normal on external inspection of nose, lips, normal thyroid exam  Neck: No abnormal masses on inspection  Resp: Normal effort, clear to auscultation bilaterally   CVS: Regular rate and rhythm, S1 S2 normal, no murmur   Extremities: No edema  Abdomen: abdominal obesity present  Neuro: Alert and oriented  Skin: No rash  Psych: Normal mood and affect, intact memory and able to make informed decisions    Assessment and Plan:  Medications  - No change to current regimen; reordered 90 day refills  - Will increase Jardiance to 25 mg daily at next visit    Lifestyle Modifications  - Continue with portion control and daily walks    Monitoring  - Continue checking FBG  - Labs ordered for pt to get done 1 week prior to next visit (CMP, Microalbumin/Creatinine Ratio, A1C)    F/U in 3 months on 11/12/19; A1C and monofilament will be done    Thank you for allowing me to participate in the care of this patient.    Estela Sands, PharmD  08/13/19    CC:   Keon Talamantes M.D.

## 2019-11-12 ENCOUNTER — NON-PROVIDER VISIT (OUTPATIENT)
Dept: MEDICAL GROUP | Facility: MEDICAL CENTER | Age: 59
End: 2019-11-12
Attending: INTERNAL MEDICINE
Payer: MEDICARE

## 2019-11-12 VITALS
HEART RATE: 65 BPM | BODY MASS INDEX: 25.13 KG/M2 | WEIGHT: 133 LBS | DIASTOLIC BLOOD PRESSURE: 80 MMHG | SYSTOLIC BLOOD PRESSURE: 119 MMHG

## 2019-11-12 DIAGNOSIS — E11.8 TYPE 2 DIABETES MELLITUS WITH COMPLICATION, WITHOUT LONG-TERM CURRENT USE OF INSULIN (HCC): Primary | ICD-10-CM

## 2019-11-12 LAB
HBA1C MFR BLD: 5.8 % (ref 0–5.6)
INT CON NEG: ABNORMAL
INT CON POS: ABNORMAL

## 2019-11-12 PROCEDURE — 83036 HEMOGLOBIN GLYCOSYLATED A1C: CPT | Performed by: INTERNAL MEDICINE

## 2019-11-12 PROCEDURE — 83036 HEMOGLOBIN GLYCOSYLATED A1C: CPT

## 2019-11-12 PROCEDURE — 99212 OFFICE O/P EST SF 10 MIN: CPT | Performed by: PHARMACIST

## 2019-11-12 NOTE — PROGRESS NOTES
New Patient Consult Note 03:00-03:30 pm  Primary care physician: Keon Talamantes M.D.    Reason for consult: Management of Uncontrolled Type 2 Diabetes    HPI:  Shahida Price is a 59 y.o. old patient who comes in today for evaluation of above stated problem.  This patient continues to do very well and her A1C was still in range today. She is currently on a low dose of Jardiance and a low dose of Metformin. Her kidney is doing very good and she is up to date on her quality measures.    Most Recent HbA1c:   Lab Results   Component Value Date/Time    HBA1C 5.8 (A) 11/12/2019 03:10 PM        Current Diabetes Regimen:  SGLT-2 Inhibitor:  Empagliflozin 10 mg once daily   Metformin 1000 mg bid    Before Breakfast:   Before Lunch:  Before Dinner:  Before Bedtime:  Other times:  Hypoglycemia:  None      ROS:  Constitutional: No weight loss  Cardiac: No palpitations or racing heart  Resp: No shortness of breath  Neuro: No numbness or tinging in feet  Endo: No heat or cold intolerance, no polyuria or polydipsia  All other systems were reviewed and were negative.    Past Medical History:  Patient Active Problem List    Diagnosis Date Noted   • Chest pain 08/17/2017     Priority: High   • Essential hypertension 04/17/2017     Priority: Medium   • Mood disorder (HCC) 04/17/2017     Priority: Medium   • Type 2 diabetes mellitus (HCC) 04/17/2017     Priority: Low   • Dyslipidemia 04/17/2017     Priority: Low   • Hemorrhoids 12/19/2017   • History of gastritis 04/17/2017   • Atypical chest pain 04/17/2017   • Obesity 04/17/2017   • Flexural eczema 04/17/2017   • Hair loss 04/17/2017   • Adhesive capsulitis of both shoulders 04/17/2017       Past Surgical History:  Past Surgical History:   Procedure Laterality Date   • ZZZ CARDIAC CATH  04/25/2018    normal coronaires   • CHOLECYSTECTOMY  2007   • OTHER ORTHOPEDIC SURGERY  2007    shoulder surgery       Allergies:  Shellfish allergy    Social History:  Social History      Socioeconomic History   • Marital status:      Spouse name: Not on file   • Number of children: Not on file   • Years of education: Not on file   • Highest education level: Not on file   Occupational History   • Not on file   Social Needs   • Financial resource strain: Not on file   • Food insecurity:     Worry: Not on file     Inability: Not on file   • Transportation needs:     Medical: Not on file     Non-medical: Not on file   Tobacco Use   • Smoking status: Former Smoker     Packs/day: 1.00     Years: 15.00     Pack years: 15.00     Last attempt to quit: 2004     Years since quitting: 15.8   • Smokeless tobacco: Never Used   Substance and Sexual Activity   • Alcohol use: No     Comment: 1 beer / month   • Drug use: No   • Sexual activity: Yes     Partners: Male     Comment: menopause 2015   Lifestyle   • Physical activity:     Days per week: Not on file     Minutes per session: Not on file   • Stress: Not on file   Relationships   • Social connections:     Talks on phone: Not on file     Gets together: Not on file     Attends Congregation service: Not on file     Active member of club or organization: Not on file     Attends meetings of clubs or organizations: Not on file     Relationship status: Not on file   • Intimate partner violence:     Fear of current or ex partner: Not on file     Emotionally abused: Not on file     Physically abused: Not on file     Forced sexual activity: Not on file   Other Topics Concern   • Not on file   Social History Narrative   • Not on file       Family History:  Family History   Problem Relation Age of Onset   • Cancer Mother         lung cancer, chronic smoker   • Lung Disease Mother    • Heart Attack Mother    • Diabetes Father        Medications:    Current Outpatient Medications:   •  Empagliflozin (JARDIANCE) 10 MG Tab, Take 1 Tab by mouth every day. TAKE 1 TAB BY MOUTH EVERY DAY., Disp: 30 Tab, Rfl: 3  •  metFORMIN (GLUCOPHAGE) 500 MG Tab, TOME MEGGAN TABLETA DOS  ROSEANNEES AL ROLAND CON LAS COMIDAS, Disp: 180 Tab, Rfl: 0  •  glucose blood (TRUE METRIX BLOOD GLUCOSE TEST) strip, Test blood sugars once daily, Disp: 50 Strip, Rfl: 11  •  triamcinolone acetonide (KENALOG) 0.1 % Ointment, Apply 1 Application to affected area(s) 2 times a day as needed., Disp: 1 Tube, Rfl: 0  •  ammonium lactate (LAC-HYDRIN) 12 % Lotion, Apply 1 Application to affected area(s) as needed., Disp: 1 Bottle, Rfl: 3  •  lisinopril (PRINIVIL) 10 MG Tab, Take 1 Tab by mouth every day., Disp: 90 Tab, Rfl: 1  •  vitamin D, Ergocalciferol, (DRISDOL) 17463 units Cap capsule, Take 1 Cap by mouth every 28 days., Disp: 3 Cap, Rfl: 3  •  levothyroxine (SYNTHROID) 25 MCG Tab, Take 1 Tab by mouth every morning. ON A EMPTY STOMACH, Disp: 90 Tab, Rfl: 1  •  gabapentin (NEURONTIN) 300 MG Cap, Take 1 Cap by mouth 3 times a day., Disp: 270 Cap, Rfl: 1  •  cyclobenzaprine (FLEXERIL) 10 MG Tab, Take 1 Tab by mouth 3 times a day as needed., Disp: 30 Tab, Rfl: 3  •  atorvastatin (LIPITOR) 20 MG Tab, Take 1 Tab by mouth every day., Disp: 90 Tab, Rfl: 1  •  albuterol 108 (90 Base) MCG/ACT Aero Soln inhalation aerosol, Inhale 2 Puffs by mouth every 6 hours as needed for Shortness of Breath., Disp: 8.5 g, Rfl: 6  •  aspirin (ASA) 81 MG Chew Tab chewable tablet, Take 1 Tab by mouth every day., Disp: 100 Tab, Rfl: 3  •  fluticasone (FLONASE) 50 MCG/ACT nasal spray, Spray 2 Sprays in nose every day., Disp: 16 g, Rfl: 11  •  Blood Glucose Monitoring Suppl (TRUE METRIX METER) w/Device Kit, USE ONCE DAILY TO TEST BLOOD SUGAR, Disp: 1 Kit, Rfl: 0  •  TRUEPLUS SAFETY LANCETS 28G Misc, , Disp: , Rfl:   •  omeprazole (PRILOSEC) 20 MG Tablet Delayed Response delayed-release tablet, Take 1 Tab by mouth every day., Disp: 30 Tab, Rfl: 6    Labs: Reviewed    Physical Examination:  Vital signs: /80   Pulse 65   Wt 60.3 kg (133 lb)   BMI 25.13 kg/m²  Body mass index is 25.13 kg/m².  General: No apparent distress, cooperative  Eyes: No  scleral icterus or discharge  ENMT: Normal on external inspection of nose, lips, normal thyroid exam  Neck: No abnormal masses on inspection  Resp: Normal effort, clear to auscultation bilaterally   CVS: Regular rate and rhythm, S1 S2 normal, no murmur   Extremities: No edema  Abdomen: abdominal obesity present  Neuro: Alert and oriented  Skin: No rash  Psych: Normal mood and affect, intact memory and able to make informed decisions    Assessment and Plan:    1. Type 2 diabetes mellitus with complication, without long-term current use of insulin (HCC)  Continue current therapy  Get labs done before next visit  F/u in 6 months          Thank you for allowing me to participate in the care of this patient.    Allison Walsh, PharmD  11/12/19    CC:

## 2019-12-11 DIAGNOSIS — I10 ESSENTIAL HYPERTENSION: ICD-10-CM

## 2019-12-11 DIAGNOSIS — E78.5 DYSLIPIDEMIA: ICD-10-CM

## 2019-12-11 DIAGNOSIS — E11.8 TYPE 2 DIABETES MELLITUS WITH COMPLICATION, WITHOUT LONG-TERM CURRENT USE OF INSULIN (HCC): ICD-10-CM

## 2019-12-11 RX ORDER — ATORVASTATIN CALCIUM 20 MG/1
TABLET, FILM COATED ORAL
Qty: 90 TAB | Refills: 0 | Status: SHIPPED | OUTPATIENT
Start: 2019-12-11 | End: 2020-03-02 | Stop reason: SDUPTHER

## 2020-02-06 ENCOUNTER — APPOINTMENT (RX ONLY)
Dept: URBAN - METROPOLITAN AREA CLINIC 4 | Facility: CLINIC | Age: 60
Setting detail: DERMATOLOGY
End: 2020-02-06

## 2020-02-06 DIAGNOSIS — L64.8 OTHER ANDROGENIC ALOPECIA: ICD-10-CM

## 2020-02-06 DIAGNOSIS — D18.0 HEMANGIOMA: ICD-10-CM

## 2020-02-06 DIAGNOSIS — L82.1 OTHER SEBORRHEIC KERATOSIS: ICD-10-CM

## 2020-02-06 DIAGNOSIS — D22 MELANOCYTIC NEVI: ICD-10-CM

## 2020-02-06 PROBLEM — D22.5 MELANOCYTIC NEVI OF TRUNK: Status: ACTIVE | Noted: 2020-02-06

## 2020-02-06 PROBLEM — D18.01 HEMANGIOMA OF SKIN AND SUBCUTANEOUS TISSUE: Status: ACTIVE | Noted: 2020-02-06

## 2020-02-06 PROCEDURE — 99214 OFFICE O/P EST MOD 30 MIN: CPT

## 2020-02-06 PROCEDURE — ? ADDITIONAL NOTES

## 2020-02-06 PROCEDURE — ? COUNSELING

## 2020-02-06 PROCEDURE — ? DIAGNOSIS COMMENT

## 2020-02-06 ASSESSMENT — LOCATION DETAILED DESCRIPTION DERM
LOCATION DETAILED: RIGHT LATERAL ABDOMEN
LOCATION DETAILED: POSTERIOR MID-PARIETAL SCALP
LOCATION DETAILED: RIGHT MID-UPPER BACK
LOCATION DETAILED: PERIUMBILICAL SKIN
LOCATION DETAILED: LEFT MEDIAL FRONTAL SCALP

## 2020-02-06 ASSESSMENT — LOCATION SIMPLE DESCRIPTION DERM
LOCATION SIMPLE: ABDOMEN
LOCATION SIMPLE: POSTERIOR SCALP
LOCATION SIMPLE: RIGHT UPPER BACK
LOCATION SIMPLE: LEFT SCALP

## 2020-02-06 ASSESSMENT — LOCATION ZONE DERM
LOCATION ZONE: SCALP
LOCATION ZONE: TRUNK

## 2020-02-06 NOTE — PROCEDURE: ADDITIONAL NOTES
Detail Level: Simple
Additional Notes: Discussed with patient that she can start over the counter minoxidil, this can be purchased on amaz[employer] for a lower price.

## 2020-02-06 NOTE — HPI: SECONDARY COMPLAINT
Additional History: Patient has switched her hair care products to more natural and she some times takes biotin.

## 2020-02-24 ENCOUNTER — OFFICE VISIT (OUTPATIENT)
Dept: URGENT CARE | Facility: CLINIC | Age: 60
End: 2020-02-24
Payer: MEDICARE

## 2020-02-24 VITALS
BODY MASS INDEX: 26.06 KG/M2 | HEART RATE: 82 BPM | HEIGHT: 61 IN | WEIGHT: 138 LBS | TEMPERATURE: 98.2 F | SYSTOLIC BLOOD PRESSURE: 104 MMHG | OXYGEN SATURATION: 97 % | RESPIRATION RATE: 12 BRPM | DIASTOLIC BLOOD PRESSURE: 70 MMHG

## 2020-02-24 DIAGNOSIS — S46.102A INJURY OF TENDON OF LONG HEAD OF LEFT BICEPS, INITIAL ENCOUNTER: ICD-10-CM

## 2020-02-24 PROCEDURE — 99204 OFFICE O/P NEW MOD 45 MIN: CPT | Performed by: PHYSICIAN ASSISTANT

## 2020-02-24 RX ORDER — TRAMADOL HYDROCHLORIDE 50 MG/1
50 TABLET ORAL EVERY 4 HOURS PRN
Qty: 30 TAB | Refills: 0 | Status: SHIPPED | OUTPATIENT
Start: 2020-02-24 | End: 2020-03-02

## 2020-02-24 NOTE — PROGRESS NOTES
Subjective:      Shahida Price is a 59 y.o. female who presents with Shoulder Pain (Onset 8 months ago, pain in left shoulder. 2 weeks ago, picked up grandson, hurting left arm and shoulder.)    PMH:  has a past medical history of Allergy, Atopic dermatitis, Depression, Diabetes (HCC), Gastritis, Hyperlipidemia, and Hypertension.  MEDS:   Current Outpatient Medications:   •  atorvastatin (LIPITOR) 20 MG Tab, TOME MEGGAN TABLETA TODOS LOS DRAPER, Disp: 90 Tab, Rfl: 0  •  metFORMIN (GLUCOPHAGE) 500 MG Tab, TOME MEGGAN TABLETA DOS VECES AL ROLAND CON LAS COMIDAS, Disp: 180 Tab, Rfl: 0  •  Empagliflozin (JARDIANCE) 10 MG Tab, Take 1 Tab by mouth every day. TAKE 1 TAB BY MOUTH EVERY DAY., Disp: 30 Tab, Rfl: 3  •  glucose blood (TRUE METRIX BLOOD GLUCOSE TEST) strip, Test blood sugars once daily, Disp: 50 Strip, Rfl: 11  •  triamcinolone acetonide (KENALOG) 0.1 % Ointment, Apply 1 Application to affected area(s) 2 times a day as needed., Disp: 1 Tube, Rfl: 0  •  ammonium lactate (LAC-HYDRIN) 12 % Lotion, Apply 1 Application to affected area(s) as needed., Disp: 1 Bottle, Rfl: 3  •  lisinopril (PRINIVIL) 10 MG Tab, Take 1 Tab by mouth every day., Disp: 90 Tab, Rfl: 1  •  levothyroxine (SYNTHROID) 25 MCG Tab, Take 1 Tab by mouth every morning. ON A EMPTY STOMACH, Disp: 90 Tab, Rfl: 1  •  gabapentin (NEURONTIN) 300 MG Cap, Take 1 Cap by mouth 3 times a day. (Patient taking differently: Take 300 mg by mouth 3 times a day. Average 2 times per day), Disp: 270 Cap, Rfl: 1  •  albuterol 108 (90 Base) MCG/ACT Aero Soln inhalation aerosol, Inhale 2 Puffs by mouth every 6 hours as needed for Shortness of Breath., Disp: 8.5 g, Rfl: 6  •  aspirin (ASA) 81 MG Chew Tab chewable tablet, Take 1 Tab by mouth every day., Disp: 100 Tab, Rfl: 3  •  fluticasone (FLONASE) 50 MCG/ACT nasal spray, Spray 2 Sprays in nose every day., Disp: 16 g, Rfl: 11  •  Blood Glucose Monitoring Suppl (TRUE METRIX METER) w/Device Kit, USE ONCE DAILY TO TEST BLOOD SUGAR,  Disp: 1 Kit, Rfl: 0  •  TRUEPLUS SAFETY LANCETS 28G Misc, , Disp: , Rfl:   •  omeprazole (PRILOSEC) 20 MG Tablet Delayed Response delayed-release tablet, Take 1 Tab by mouth every day. (Patient taking differently: Take 20 mg by mouth as needed.), Disp: 30 Tab, Rfl: 6  •  vitamin D, Ergocalciferol, (DRISDOL) 09744 units Cap capsule, Take 1 Cap by mouth every 28 days. (Patient not taking: Reported on 2/24/2020), Disp: 3 Cap, Rfl: 3  •  cyclobenzaprine (FLEXERIL) 10 MG Tab, Take 1 Tab by mouth 3 times a day as needed. (Patient not taking: Reported on 2/24/2020), Disp: 30 Tab, Rfl: 3  ALLERGIES:   Allergies   Allergen Reactions   • Shellfish Allergy Hives and Rash     Shellfish     SURGHX:   Past Surgical History:   Procedure Laterality Date   • ZZZ CARDIAC CATH  04/25/2018    normal coronaires   • CHOLECYSTECTOMY  2007   • OTHER ORTHOPEDIC SURGERY  2007    shoulder surgery     SOCHX:  reports that she quit smoking about 16 years ago. She has a 15.00 pack-year smoking history. She has never used smokeless tobacco. She reports that she does not drink alcohol or use drugs.  FH: Reviewed with patient, not pertinent to this visit.           Patient presents with:  Shoulder Pain: Onset 8 months ago, pain in left shoulder. 2 weeks ago, picked up grandson, hurting left arm and shoulder.  Pt states she has a bulging muscle in her arm, mild/moderately painful with exertion but typically not painful at rest.  Pt has had similar injury to right arm a few years ago, for she had to have  Surgical repair.  Pt is concerned she has the same injury in her left upper arm.         Arm Pain    The incident occurred more than 1 week ago. The incident occurred at home. Injury mechanism: lifting her grandson. The pain is present in the upper left arm. The quality of the pain is described as aching and burning. The pain does not radiate. The pain is at a severity of 6/10. The pain has been constant since the incident. Pertinent negatives  "include no chest pain, muscle weakness, numbness or tingling. The symptoms are aggravated by palpation, lifting and movement. She has tried ice, NSAIDs, immobilization and rest for the symptoms. The treatment provided no relief.       Review of Systems   Cardiovascular: Negative for chest pain.   Musculoskeletal: Positive for myalgias.   Neurological: Negative for tingling, sensory change, focal weakness and numbness.   All other systems reviewed and are negative.         Objective:     /70 (BP Location: Right arm, Patient Position: Sitting, BP Cuff Size: Adult)   Pulse 82   Temp 36.8 °C (98.2 °F) (Temporal)   Resp 12   Ht 1.549 m (5' 1\")   Wt 62.6 kg (138 lb)   SpO2 97%   BMI 26.07 kg/m²      Physical Exam  Vitals signs and nursing note reviewed.   Constitutional:       General: She is not in acute distress.     Appearance: Normal appearance. She is well-developed. She is not diaphoretic.   HENT:      Head: Normocephalic and atraumatic.      Nose: Nose normal.      Mouth/Throat:      Lips: Pink.      Mouth: Mucous membranes are moist.   Eyes:      Extraocular Movements: Extraocular movements intact.      Conjunctiva/sclera: Conjunctivae normal.      Pupils: Pupils are equal, round, and reactive to light.   Neck:      Musculoskeletal: Normal range of motion and neck supple.   Cardiovascular:      Rate and Rhythm: Normal rate and regular rhythm.      Pulses: Normal pulses.      Heart sounds: Normal heart sounds.   Pulmonary:      Effort: Pulmonary effort is normal.   Abdominal:      General: Abdomen is flat.   Musculoskeletal:        Arms:    Skin:     General: Skin is warm and dry.      Capillary Refill: Capillary refill takes less than 2 seconds.   Neurological:      General: No focal deficit present.      Mental Status: She is alert and oriented to person, place, and time.   Psychiatric:         Mood and Affect: Mood normal.                 Assessment/Plan:      1. Injury of tendon of long head of " left biceps, initial encounter  REFERRAL TO ORTHOPEDICS    tramadol (ULTRAM) 50 MG Tab     Sling applied to LUE.     -PT given precautions about frozen shoulder due to immobilization in sling.    -Pt shown shoulder ROM exercises to do 4 times daily while using sling.   -Pt verbalized understanding of precautions and agrees to do exercises as directed.     PT should follow up with PCP in 1-2 days for re-evaluation if symptoms have not improved.  Discussed red flags and reasons to return to UC or ED.  Pt and/or family verbalized understanding of diagnosis and follow up instructions and was offered informational handout on diagnosis.  PT discharged.

## 2020-02-28 ASSESSMENT — ENCOUNTER SYMPTOMS
NUMBNESS: 0
FOCAL WEAKNESS: 0
MYALGIAS: 1
TINGLING: 0
MUSCLE WEAKNESS: 0
SENSORY CHANGE: 0

## 2020-03-02 ENCOUNTER — OFFICE VISIT (OUTPATIENT)
Dept: MEDICAL GROUP | Facility: PHYSICIAN GROUP | Age: 60
End: 2020-03-02
Payer: MEDICARE

## 2020-03-02 VITALS
DIASTOLIC BLOOD PRESSURE: 62 MMHG | WEIGHT: 141 LBS | TEMPERATURE: 98.5 F | SYSTOLIC BLOOD PRESSURE: 104 MMHG | HEIGHT: 62 IN | HEART RATE: 88 BPM | BODY MASS INDEX: 25.95 KG/M2 | RESPIRATION RATE: 16 BRPM | OXYGEN SATURATION: 96 %

## 2020-03-02 DIAGNOSIS — E11.00 TYPE 2 DIABETES MELLITUS WITH HYPEROSMOLARITY WITHOUT COMA, WITHOUT LONG-TERM CURRENT USE OF INSULIN (HCC): ICD-10-CM

## 2020-03-02 DIAGNOSIS — E03.9 ACQUIRED HYPOTHYROIDISM: ICD-10-CM

## 2020-03-02 DIAGNOSIS — E07.9 THYROID MASS: ICD-10-CM

## 2020-03-02 DIAGNOSIS — I10 ESSENTIAL HYPERTENSION: ICD-10-CM

## 2020-03-02 DIAGNOSIS — E78.5 DYSLIPIDEMIA: ICD-10-CM

## 2020-03-02 DIAGNOSIS — R05.9 COUGH: ICD-10-CM

## 2020-03-02 PROCEDURE — 99214 OFFICE O/P EST MOD 30 MIN: CPT | Performed by: FAMILY MEDICINE

## 2020-03-02 RX ORDER — EMPAGLIFLOZIN 10 MG/1
1 TABLET, FILM COATED ORAL
Qty: 30 TAB | Refills: 3 | Status: SHIPPED | OUTPATIENT
Start: 2020-03-02 | End: 2020-06-08 | Stop reason: SDUPTHER

## 2020-03-02 RX ORDER — LEVOTHYROXINE SODIUM 0.03 MG/1
25 TABLET ORAL EVERY MORNING
Qty: 90 TAB | Refills: 1 | Status: SHIPPED | OUTPATIENT
Start: 2020-03-02 | End: 2020-09-14

## 2020-03-02 RX ORDER — ATORVASTATIN CALCIUM 20 MG/1
TABLET, FILM COATED ORAL
Qty: 90 TAB | Refills: 0 | Status: SHIPPED | OUTPATIENT
Start: 2020-03-02 | End: 2020-06-30

## 2020-03-02 RX ORDER — ALBUTEROL SULFATE 90 UG/1
2 AEROSOL, METERED RESPIRATORY (INHALATION) EVERY 6 HOURS PRN
Qty: 8.5 G | Refills: 6 | Status: SHIPPED | OUTPATIENT
Start: 2020-03-02 | End: 2021-01-05 | Stop reason: SDUPTHER

## 2020-03-02 ASSESSMENT — FIBROSIS 4 INDEX: FIB4 SCORE: 1

## 2020-03-03 NOTE — ASSESSMENT & PLAN NOTE
This is a chronic problem.  She has had a dry cough mostly at night over the past 4 years.  She was prescribed albuterol as needed by previous PCP. This helps. Requesting refill today.

## 2020-03-03 NOTE — PROGRESS NOTES
"Subjective:     Chief Complaint   Patient presents with   • Establish Care     refills         HPI:   Shahida presents today to discuss the following.  Prior PCP: Dr Talamantes    Type 2 diabetes mellitus (HCC)  This is a chronic problem.  The patient has DM for many years. Is taking metformin 500mg BID along jardiance 10mg once daily.   Last A1c  at 5.8.   Has not seen ophthalmologist         Cough  This is a chronic problem.  She has had a dry cough mostly at night over the past 4 years.  She was prescribed albuterol as needed by previous PCP. This helps. Requesting refill today.     Acquired hypothyroidism  This is a chronic problem.   The patient has a hx of hypothyroidism on synthroid 25mcgs. Requesting lab repeat and refill.     Thyroid mass  This is a chronic problem.  The patient has a hx of right thyroid mass.  She had an US in 2018 which demonstrated an \"ill defined\" mass and neoplasm could not be ruled out.   However, she did not go through with FNA.  She was in [place] and had repeat US and this time this mass had \"shrunk\" according to patient. I do not have records of that. Patient is requesting repeat US today.       Past Medical History:   Diagnosis Date   • Allergy    • Atopic dermatitis    • Depression    • Diabetes (HCC)    • Gastritis    • Hyperlipidemia    • Hypertension        Social History     Tobacco Use   • Smoking status: Former Smoker     Packs/day: 1.00     Years: 15.00     Pack years: 15.00     Last attempt to quit:      Years since quittin.1   • Smokeless tobacco: Never Used   Substance Use Topics   • Alcohol use: Not Currently   • Drug use: Never       Current Outpatient Medications Ordered in Epic   Medication Sig Dispense Refill   • metFORMIN (GLUCOPHAGE) 500 MG Tab TOME MEGGAN TABLETA DOS VECES AL ROLAND CON LAS COMIDAS 180 Tab 0   • Empagliflozin (JARDIANCE) 10 MG Tab Take 1 Tab by mouth every day. TAKE 1 TAB BY MOUTH EVERY DAY. 30 Tab 3   • albuterol 108 (90 Base) MCG/ACT Aero " "Soln inhalation aerosol Inhale 2 Puffs by mouth every 6 hours as needed for Shortness of Breath. 8.5 g 6   • atorvastatin (LIPITOR) 20 MG Tab TOME MEGGAN TABLETA TODOS LOS DRAPER 90 Tab 0   • levothyroxine (SYNTHROID) 25 MCG Tab Take 1 Tab by mouth every morning. ON A EMPTY STOMACH 90 Tab 1   • glucose blood (TRUE METRIX BLOOD GLUCOSE TEST) strip Test blood sugars once daily 50 Strip 11   • triamcinolone acetonide (KENALOG) 0.1 % Ointment Apply 1 Application to affected area(s) 2 times a day as needed. 1 Tube 0   • lisinopril (PRINIVIL) 10 MG Tab Take 1 Tab by mouth every day. 90 Tab 1   • gabapentin (NEURONTIN) 300 MG Cap Take 1 Cap by mouth 3 times a day. (Patient taking differently: Take 300 mg by mouth 3 times a day. Average 2 times per day) 270 Cap 1   • cyclobenzaprine (FLEXERIL) 10 MG Tab Take 1 Tab by mouth 3 times a day as needed. 30 Tab 3   • aspirin (ASA) 81 MG Chew Tab chewable tablet Take 1 Tab by mouth every day. 100 Tab 3   • fluticasone (FLONASE) 50 MCG/ACT nasal spray Spray 2 Sprays in nose every day. 16 g 11   • Blood Glucose Monitoring Suppl (TRUE METRIX METER) w/Device Kit USE ONCE DAILY TO TEST BLOOD SUGAR 1 Kit 0   • TRUEPLUS SAFETY LANCETS 28G Misc      • vitamin D, Ergocalciferol, (DRISDOL) 07126 units Cap capsule Take 1 Cap by mouth every 28 days. (Patient not taking: Reported on 2/24/2020) 3 Cap 3     No current Epic-ordered facility-administered medications on file.        Allergies:  Shellfish allergy    Health Maintenance: Completed    ROS:  Gen: no fevers/chills, no changes in weight  ENT: no sore throat, no hearing loss, no bloody nose  Pulm: no sob, no cough  CV: no chest pain, no palpitations  GI: no nausea/vomiting, no diarrhea      Objective:     Exam:  /62 (BP Location: Left arm, Patient Position: Sitting, BP Cuff Size: Adult)   Pulse 88   Temp 36.9 °C (98.5 °F) (Temporal)   Resp 16   Ht 1.575 m (5' 2\")   Wt 64 kg (141 lb)   SpO2 96%   BMI 25.79 kg/m²  Body mass index is " 25.79 kg/m².    Constitutional: Alert, no distress, well-groomed.  Skin: Warm, dry, good turgor, no rashes in visible areas.  Eye: Equal, round and reactive, conjunctiva clear, lids normal.  ENMT: Lips without lesions, good dentition, moist mucous membranes.  I could not palpate her right thyroid mass on exam today.  Neck: Trachea midline, no masses, no thyromegaly.  Respiratory: Unlabored respiratory effort, no cough.  MSK: Normal gait, moves all extremities.  Neuro: Grossly non-focal.   Psych: Alert and oriented x3, normal affect and mood.      Assessment & Plan:     59 y.o. female with the following -     1. Type 2 diabetes mellitus with hyperosmolarity without coma, without long-term current use of insulin (HCC)  This is a chronic, stable condition.  The patient presents today for follow-up on her diabetes.  She is taking metformin 500 mg twice daily as well as Jardiance 10 mg daily.  Last A1c was approximately 6 months ago at 5.8.  I would like to establish labs today again.  The patient is already on statin therapy.  She has not seen an ophthalmologist and referral was not placed today.  Blood pressure is within goal.  She is already on ACE.  Foot exam done within the last 12 months.  - metFORMIN (GLUCOPHAGE) 500 MG Tab; TOME MEGGAN TABLETA DOS VECES AL ROALND CON LAS COMIDAS  Dispense: 180 Tab; Refill: 0  - atorvastatin (LIPITOR) 20 MG Tab; TOME MEGGAN TABLETA TODOS LOS DRAPER  Dispense: 90 Tab; Refill: 0  - HEMOGLOBIN A1C; Future  - Lipid Profile; Future  - TSH WITH REFLEX TO FT4; Future  - Basic Metabolic Panel; Future  - CBC WITHOUT DIFFERENTIAL; Future  - REFERRAL TO OPHTHALMOLOGY    2. Dyslipidemia  There is a chronic, stable condition.  The patient has a history of hyperlipidemia on atorvastatin 20 mg daily.  Lipid panel will be repeated today.  - atorvastatin (LIPITOR) 20 MG Tab; TOME MEGGAN TABLETA TODOS LOS DRAPER  Dispense: 90 Tab; Refill: 0    3. Cough  There is a chronic, stable condition.  The patient has a  history of a cough of unknown etiology.  Suspect asthma as he gets relief with albuterol as needed.  - albuterol 108 (90 Base) MCG/ACT Aero Soln inhalation aerosol; Inhale 2 Puffs by mouth every 6 hours as needed for Shortness of Breath.  Dispense: 8.5 g; Refill: 6    4. Essential hypertension  This is a chronic, stable condition.  The patient has a history of hypertension well-controlled with lisinopril 10 mg daily.  -Continue current regimen    5. Acquired hypothyroidism  There is a chronic, stable condition.  The patient has a history of hypothyroidism controlled with Synthroid 25 mcg daily.  Refill authorized today.  We will repeat TSH today.  - levothyroxine (SYNTHROID) 25 MCG Tab; Take 1 Tab by mouth every morning. ON A EMPTY STOMACH  Dispense: 90 Tab; Refill: 1    6. Thyroid mass  There is a chronic, unchanged condition.  The patient has a known history of a right thyroid mass as evidenced on neck ultrasound in 2018.  Per results, this was an ill-defined mass.  However the patient recalls getting a repeat ultrasound in 2019 in [place] which had shown shrinkage of this mass.  I do not have results of this at this time.  I will repeat neck ultrasound today and refer to ENT if FNA is needed.  The patient is agreeable to plan.  - US-SOFT TISSUES OF HEAD - NECK; Future      Return in about 3 months (around 6/2/2020) for DM.    Please note that this dictation was created using voice recognition software. I have made every reasonable attempt to correct obvious errors, but I expect that there are errors of grammar and possibly content that I did not discover before finalizing the note.

## 2020-03-03 NOTE — ASSESSMENT & PLAN NOTE
This is a chronic problem.   The patient has a hx of hypothyroidism on synthroid 25mcgs. Requesting lab repeat and refill.

## 2020-03-03 NOTE — ASSESSMENT & PLAN NOTE
This is a chronic problem.  The patient has DM for many years. Is taking metformin 500mg BID along jardiance 10mg once daily.   Last A1c 11/19 at 5.8.   Has not seen ophthalmologist

## 2020-03-03 NOTE — ASSESSMENT & PLAN NOTE
"This is a chronic problem.  The patient has a hx of right thyroid mass.  She had an US in 2018 which demonstrated an \"ill defined\" mass and neoplasm could not be ruled out.   However, she did not go through with FNA.  She was in [place] and had repeat US and this time this mass had \"shrunk\" according to patient. I do not have records of that. Patient is requesting repeat US today.   "

## 2020-03-04 ENCOUNTER — HOSPITAL ENCOUNTER (OUTPATIENT)
Dept: LAB | Facility: MEDICAL CENTER | Age: 60
End: 2020-03-04
Attending: FAMILY MEDICINE
Payer: MEDICARE

## 2020-03-04 DIAGNOSIS — E11.00 TYPE 2 DIABETES MELLITUS WITH HYPEROSMOLARITY WITHOUT COMA, WITHOUT LONG-TERM CURRENT USE OF INSULIN (HCC): ICD-10-CM

## 2020-03-04 LAB
ANION GAP SERPL CALC-SCNC: 9 MMOL/L (ref 0–11.9)
BUN SERPL-MCNC: 12 MG/DL (ref 8–22)
CALCIUM SERPL-MCNC: 9.4 MG/DL (ref 8.5–10.5)
CHLORIDE SERPL-SCNC: 102 MMOL/L (ref 96–112)
CHOLEST SERPL-MCNC: 129 MG/DL (ref 100–199)
CO2 SERPL-SCNC: 26 MMOL/L (ref 20–33)
CREAT SERPL-MCNC: 0.68 MG/DL (ref 0.5–1.4)
ERYTHROCYTE [DISTWIDTH] IN BLOOD BY AUTOMATED COUNT: 46.4 FL (ref 35.9–50)
EST. AVERAGE GLUCOSE BLD GHB EST-MCNC: 137 MG/DL
GLUCOSE SERPL-MCNC: 112 MG/DL (ref 65–99)
HBA1C MFR BLD: 6.4 % (ref 0–5.6)
HCT VFR BLD AUTO: 44.4 % (ref 37–47)
HDLC SERPL-MCNC: 46 MG/DL
HGB BLD-MCNC: 14.2 G/DL (ref 12–16)
LDLC SERPL CALC-MCNC: 57 MG/DL
MCH RBC QN AUTO: 28.9 PG (ref 27–33)
MCHC RBC AUTO-ENTMCNC: 32 G/DL (ref 33.6–35)
MCV RBC AUTO: 90.2 FL (ref 81.4–97.8)
PLATELET # BLD AUTO: 249 K/UL (ref 164–446)
PMV BLD AUTO: 11 FL (ref 9–12.9)
POTASSIUM SERPL-SCNC: 4.3 MMOL/L (ref 3.6–5.5)
RBC # BLD AUTO: 4.92 M/UL (ref 4.2–5.4)
SODIUM SERPL-SCNC: 137 MMOL/L (ref 135–145)
TRIGL SERPL-MCNC: 131 MG/DL (ref 0–149)
WBC # BLD AUTO: 5.1 K/UL (ref 4.8–10.8)

## 2020-03-04 PROCEDURE — 84443 ASSAY THYROID STIM HORMONE: CPT

## 2020-03-04 PROCEDURE — 85027 COMPLETE CBC AUTOMATED: CPT

## 2020-03-04 PROCEDURE — 36415 COLL VENOUS BLD VENIPUNCTURE: CPT

## 2020-03-04 PROCEDURE — 80061 LIPID PANEL: CPT

## 2020-03-04 PROCEDURE — 80048 BASIC METABOLIC PNL TOTAL CA: CPT

## 2020-03-04 PROCEDURE — 83036 HEMOGLOBIN GLYCOSYLATED A1C: CPT | Mod: GA

## 2020-03-05 ENCOUNTER — TELEPHONE (OUTPATIENT)
Dept: MEDICAL GROUP | Facility: PHYSICIAN GROUP | Age: 60
End: 2020-03-05

## 2020-03-05 LAB — TSH SERPL DL<=0.005 MIU/L-ACNC: 3.48 UIU/ML (ref 0.38–5.33)

## 2020-03-05 NOTE — TELEPHONE ENCOUNTER
Phone Number Called: [phone] (home)       Call outcome: Spoke to patient regarding message below.    Message: Pt informed will still need to have the US performed.

## 2020-03-05 NOTE — TELEPHONE ENCOUNTER
Phone Number Called: [phone] (home)       Call outcome: Spoke to patient regarding message below.    Message: Informed Pt of results, Pt understood.  Pt wanted to know if she would still need the US of the soft tissue of the head and neck that you ordered.      Please advise

## 2020-03-05 NOTE — TELEPHONE ENCOUNTER
----- Message from Thomas Garcia M.D. sent at 3/5/2020  9:44 AM PST -----  Please call patient to let know:    Lab results are in.  Thyroid is within normal limits  Fasting sugar is 112  Cholesterol levels are within normal limits  A1c is 6.4.  This is good diabetic control.    Thank you,    Thomas Garcia MD  Family Medicine Physician  [facility]  [phone]

## 2020-03-10 ENCOUNTER — PHYSICAL THERAPY (OUTPATIENT)
Dept: PHYSICAL THERAPY | Facility: REHABILITATION | Age: 60
End: 2020-03-10
Attending: FAMILY MEDICINE
Payer: MEDICARE

## 2020-03-10 DIAGNOSIS — M75.42 IMPINGEMENT SYNDROME OF LEFT SHOULDER: ICD-10-CM

## 2020-03-10 DIAGNOSIS — M25.512 LEFT SHOULDER PAIN, UNSPECIFIED CHRONICITY: ICD-10-CM

## 2020-03-10 PROCEDURE — 97014 ELECTRIC STIMULATION THERAPY: CPT

## 2020-03-10 PROCEDURE — 97110 THERAPEUTIC EXERCISES: CPT

## 2020-03-10 PROCEDURE — 97162 PT EVAL MOD COMPLEX 30 MIN: CPT

## 2020-03-10 SDOH — ECONOMIC STABILITY: GENERAL: QUALITY OF LIFE: FAIR

## 2020-03-10 ASSESSMENT — ENCOUNTER SYMPTOMS
PAIN SCALE AT LOWEST: 3
EXACERBATED BY: OVERHEAD ACTIVITY
ALLEVIATING FACTORS: PAIN MEDICATION
ALLEVIATING FACTORS: POSITION CHANGE
AWAKENINGS PER NIGHT: 4
PAIN SCALE AT HIGHEST: 7
PAIN TIMING: CONSTANT
EXACERBATED BY: HOUSEWORK
PAIN SCALE: 5
QUALITY: SHOOTING
EXACERBATED BY: LIFTING

## 2020-03-10 NOTE — OP THERAPY EVALUATION
Outpatient Physical Therapy  INITIAL EVALUATION    [facility] [place] [address] [facility], [address]  [place] [address]  Phone:  [phone]    Date of Evaluation: 03/10/2020    Patient: Shahida Price  YOB: 1960  MRN: [id number]     Referring Provider: Brina Sanchez M.D.  [address] [place] [address]   Referring Diagnosis Pain in left shoulder [M25.512];Impingement syndrome of left shoulder [M75.42]     Time Calculation               Physical Therapy Occurrence Codes    Date of onset of impairment:  20   Date physical therapy care plan established or reviewed:  3/10/20   Date physical therapy treatment started:  3/10/20          Chief Complaint: Shoulder Problem    Visit Diagnoses     ICD-10-CM   1. Left shoulder pain, unspecified chronicity M25.512   2. Impingement syndrome of left shoulder M75.42         Subjective:   History of Present Illness:     Date of onset:  2020    Mechanism of injury:  The patient is a 59 year old female who reports (L) shoulder pain following incidence lifting her grandchild. The patient had surgery to her (R) UE but she is not sure what was done; possibly rotator cuff repair. The patient is limited lifting due to increased paiin. She has difficulty reaching above her shoulder. She cannot sleep on her (L) side due to pain. She takes pain medication Aleeve or Gabapentin. She has to cook using her (R) arm and uses the (L) arm very sparingly.   Quality of life:  Fair  Sleep disturbance:  Interrupted sleep  # Times/Night awakened:  4  Pain:     Current pain ratin    At best pain rating:  3    At worst pain ratin    Quality:  Shooting    Pain timing:  Constant    Relieving factors:  Pain medication and position change    Aggravating factors:  Lifting, overhead activity and housework  Social Support:     Lives in:  Multiple-level home    Lives with:  Adult children  Hand dominance:  Right  Patient Goals:     Other patient goals:  To  increase her strength and movement to her (L) UE      Past Medical History:   Diagnosis Date   • Allergy    • Atopic dermatitis    • Depression    • Diabetes (HCC)    • Gastritis    • Hyperlipidemia    • Hypertension      Past Surgical History:   Procedure Laterality Date   • ZZZ CARDIAC CATH  2018    normal coronaires   • CHOLECYSTECTOMY     • OTHER ORTHOPEDIC SURGERY  2007    shoulder surgery     Social History     Tobacco Use   • Smoking status: Former Smoker     Packs/day: 1.00     Years: 15.00     Pack years: 15.00     Last attempt to quit: 2004     Years since quittin.2   • Smokeless tobacco: Never Used   Substance Use Topics   • Alcohol use: Not Currently     Family and Occupational History     Socioeconomic History   • Marital status:      Spouse name: Not on file   • Number of children: Not on file   • Years of education: Not on file   • Highest education level: Not on file   Occupational History   • Not on file       Objective     Postural Observations  Seated posture: fair  Standing posture: fair  Correction of posture: makes symptoms better    Additional Postural Observation Details  Patient sitting with (L) UE tucked into body supporting it with (R) hand     Palpation   Left   Tenderness of the biceps.     Tenderness     Right Shoulder  Tenderness in the biceps tendon (proximal).     Active Range of Motion   Left Shoulder   Flexion: 125 degrees   Extension: 40 degrees   Abduction: 100 degrees   External rotation BTH: C5   Internal rotation BTB: L4     Right Shoulder   Flexion: 170 degrees   Extension: 55 degrees   Abduction: 155 degrees   External rotation BTH: C6   Internal rotation BTB: L2     Additional Active Range of Motion Details  Patient has increased pain with returning (L) shoulder from IR off of low back during scratch testing    Strength:      Left Shoulder   Planes of Motion   Flexion: 4   Extension: 4+   Abduction: 4   External rotation at 90°: 4+   External rotation  BTH: 4+   Internal rotation BTB: 4+     Right Shoulder   Planes of Motion   Flexion: 5   Extension: 5   Abduction: 5   External rotation at 90°: 5   External rotation BTH: 5   Internal rotation BTB: 5     Tests     Left Shoulder   Positive Neer's.     Right Shoulder   Positive crossover, empty can and Hawkin's.     Additional Tests Details  Mixed response in testing with NEER's, Hawkin's and crossover testing in horizontal adduction; patient responds with pain in all motions         Therapeutic Exercises (CPT 01567):     1. Wall walks , 10 x    2. Pendulums (CW/CCW), 2x 10 reps each      Time-based treatments/modalities:          Assessment, Response and Plan:   Impairments: activity intolerance, impaired physical strength, lacks appropriate home exercise program and pain with function    Assessment details:  The patient is a 59 year old female who reports having (L) UE pain following incidence picking up her grand child. The patient has limitations in lifting her (L) arm above her head and shoulder and she cannot sleep on her (L) side at night. The patient observed sitting with (L) arm support in lap.  Upon palpation has increased tenderness to the (L) bicipital tendons and AROM limitations in flexion and abduction and ER. He also has more pain with returning her (L) arm from reaching behind her back. Weakness in (L) shoulder flexion, abduction and ER, reaching behind her low back and above her head. The patient had mixed responses with special testing (+) Hawkin's, Neer, and crossover testing.      Barriers to therapy:  Communication and language  Prognosis: good    Goals:   Short Term Goals:   1) Indep with HEP   2) Able to raise her (L) arm to comb her hair with 25% less pain   3) Increase AROM in (L) shoulder flexion by 5-10 degrees or more   4) Increase strength to the (L) shoulder in abduction by 1/2 MM grade  Short term goal time span:  1-2 weeks      Long Term Goals:    1) Progression/regressions advancing  with HEP   2) Lifting light objects above her head   3) Able to reach up into cabinets  4) Carries a shopping bag in her (L) arm   Long term goal time span:  2-4 weeks    Plan:   Therapy options:  Physical therapy treatment to continue  Planned therapy interventions:  E Stim Unattended (CPT 32875), Functional Training, Self Care (CPT 34528), Manual Therapy (CPT 13725), Neuromuscular Re-education (CPT 11487), Self Care ADL Training (CPT 89784), Therapeutic Activities (CPT 54927) and Therapeutic Exercise (CPT 58615)  Frequency:  2x week  Duration in weeks:  4  Duration in visits:  8      Functional Assessment Used        Referring provider co-signature:  I have reviewed this plan of care and my co-signature certifies the need for services.  Certification Dates:   From 03/10/20   To 04/09/20    Physician Signature: ________________________________ Date: ______________

## 2020-03-13 ENCOUNTER — APPOINTMENT (OUTPATIENT)
Dept: PHYSICAL THERAPY | Facility: REHABILITATION | Age: 60
End: 2020-03-13
Attending: FAMILY MEDICINE
Payer: MEDICARE

## 2020-03-13 NOTE — OP THERAPY DAILY TREATMENT
Outpatient Physical Therapy  DAILY TREATMENT     [facility] Outpatient Physical Therapy [address], [address]  [place] [address]  Phone:  [phone]    Date: 03/13/2020    Patient: Shahida Price  YOB: 1960  MRN: [id number]     Time Calculation               Chief Complaint: No chief complaint on file.    Visit #: 2    SUBJECTIVE:  The patient reports     OBJECTIVE:  Current objective measures:           Therapeutic Exercises (CPT 51046):     1. (R) sidelying IR (L) sh    2. Hands to head ER    3. Corner stretch    4. Shoulder retraction lying     5. Shoulder shrugs    Therapeutic Treatments and Modalities:     1. Manual Therapy (CPT 47728)    2. E Stim Unattended (CPT 28345)    Time-based treatments/modalities:            ASSESSMENT:   Response to treatment:     PLAN/RECOMMENDATIONS:   Plan for treatment: therapy treatment to continue next visit.  Planned interventions for next visit: continue with current treatment.

## 2020-03-16 ENCOUNTER — PHYSICAL THERAPY (OUTPATIENT)
Dept: PHYSICAL THERAPY | Facility: REHABILITATION | Age: 60
End: 2020-03-16
Attending: FAMILY MEDICINE
Payer: MEDICARE

## 2020-03-16 DIAGNOSIS — M75.42 IMPINGEMENT SYNDROME OF LEFT SHOULDER: ICD-10-CM

## 2020-03-16 DIAGNOSIS — M25.512 LEFT SHOULDER PAIN, UNSPECIFIED CHRONICITY: ICD-10-CM

## 2020-03-16 PROCEDURE — 97110 THERAPEUTIC EXERCISES: CPT

## 2020-03-16 PROCEDURE — 97140 MANUAL THERAPY 1/> REGIONS: CPT

## 2020-03-16 PROCEDURE — 97014 ELECTRIC STIMULATION THERAPY: CPT

## 2020-03-16 NOTE — OP THERAPY DAILY TREATMENT
Outpatient Physical Therapy  DAILY TREATMENT     [facility] Outpatient Physical Therapy [address] [facility], [address]  [place] [address]  Phone:  [phone]    Date: 03/16/2020    Patient: Shahida Price  YOB: 1960  MRN: [id number]     Time Calculation  Start time: 1600  Stop time: 1630 Time Calculation (min): 30 minutes       Chief Complaint: Neck Problem and Shoulder Problem    Visit #: 2    SUBJECTIVE:  The patient reports having (R) shoulder soreness and upper back and neck pain this week. She also noted pain to the (L) shoulder but has experienced more to the posterior neck and shoulder more recently.     OBJECTIVE:  Current objective measures:     decrease pain to the cervical/shoulder region; increase (L) shoulder AROM in ER/IR       Therapeutic Exercises (CPT 67556):     1. (L) sidelying IR (L) sh, 5 x30 sec    2. Hands to head ER, 1 x10 reps    3. Corner stretch    4. Shoulder retraction lying , 5x for 10 sec    5. Shoulder shrugs, 1 x10 reps     Therapeutic Treatments and Modalities:     1. Manual Therapy (CPT 31059), Cervical, STM to the upper trapezius, scalenes, PAMS to bilateral shoulders in flexion, abduction and ER/IR     2. E Stim Unattended (CPT 32974), C/s and (R) shoulder , IFC and MHP for 15 minutes     Time-based treatments/modalities:  Manual therapy minutes (CPT 19501): 10 minutes  Therapeutic exercise minutes (CPT 61048): 20 minutes       ASSESSMENT:   Response to treatment:   STM to the upper trapezius muscle to the (R) side; levator scapula; patient sensitive to the anterior shoulder and upper trapezius; mixed response to treatment with AROM in the (L) shoulder and PROM with manual therapy techniques. Patient hypersensitive to motion intermittently and inconsistently with (R) and (L) shoulder.     PLAN/RECOMMENDATIONS:   Plan for treatment: therapy treatment to continue next visit.  Planned interventions for next visit: continue with current treatment.

## 2020-03-18 ENCOUNTER — HOSPITAL ENCOUNTER (OUTPATIENT)
Dept: RADIOLOGY | Facility: MEDICAL CENTER | Age: 60
End: 2020-03-18
Attending: FAMILY MEDICINE
Payer: MEDICARE

## 2020-03-18 ENCOUNTER — PHYSICAL THERAPY (OUTPATIENT)
Dept: PHYSICAL THERAPY | Facility: REHABILITATION | Age: 60
End: 2020-03-18
Attending: FAMILY MEDICINE
Payer: MEDICARE

## 2020-03-18 DIAGNOSIS — I10 ESSENTIAL HYPERTENSION: ICD-10-CM

## 2020-03-18 DIAGNOSIS — E11.8 TYPE 2 DIABETES MELLITUS WITH COMPLICATION, WITHOUT LONG-TERM CURRENT USE OF INSULIN (HCC): ICD-10-CM

## 2020-03-18 DIAGNOSIS — E78.5 DYSLIPIDEMIA: ICD-10-CM

## 2020-03-18 DIAGNOSIS — M25.512 LEFT SHOULDER PAIN, UNSPECIFIED CHRONICITY: ICD-10-CM

## 2020-03-18 DIAGNOSIS — E07.9 THYROID MASS: ICD-10-CM

## 2020-03-18 DIAGNOSIS — M75.42 IMPINGEMENT SYNDROME OF LEFT SHOULDER: ICD-10-CM

## 2020-03-18 PROCEDURE — 97110 THERAPEUTIC EXERCISES: CPT

## 2020-03-18 PROCEDURE — 97140 MANUAL THERAPY 1/> REGIONS: CPT

## 2020-03-18 PROCEDURE — 97014 ELECTRIC STIMULATION THERAPY: CPT

## 2020-03-18 PROCEDURE — 76536 US EXAM OF HEAD AND NECK: CPT

## 2020-03-18 NOTE — TELEPHONE ENCOUNTER
Received request via: Pharmacy    Was the patient seen in the last year in this department? Yes    Does the patient have an active prescription (recently filled or refills available) for medication(s) requested? No   Future Appointments       Provider Department [place]    3/18/2020 3:00 PM S [facility]  [facility] [place]    3/23/2020 10:30 AM Shawn Barrientos, PT [facility]    3/25/2020 11:00 AM Shawn Barrientos, PT [facility]    3/27/2020 9:30 AM Shawn Barrientos, PT [facility]    3/30/2020 2:30 PM Shawn Barrientos, PT [facility]    4/1/2020 2:00 PM Shawn Barrientos, PT [facility]    5/13/2020 2:30 PM [facility] PHARMACIST [facility]    6/1/2020 1:00 PM Thomas Garcia M.D. [facility] [address]

## 2020-03-19 ENCOUNTER — TELEPHONE (OUTPATIENT)
Dept: MEDICAL GROUP | Facility: PHYSICIAN GROUP | Age: 60
End: 2020-03-19

## 2020-03-19 RX ORDER — LISINOPRIL 10 MG/1
TABLET ORAL
Qty: 90 TAB | Refills: 1 | Status: SHIPPED | OUTPATIENT
Start: 2020-03-19 | End: 2020-09-08

## 2020-03-19 NOTE — TELEPHONE ENCOUNTER
----- Message from Thomas Garcia M.D. sent at 3/19/2020  7:12 AM PDT -----  Please call patient to let know:    Thyroid ultrasound results are in.    The right thyroid mass previously seen is no longer present  No new thyroid nodules appreciated  Thyroid appears irregular rather than smooth and radiologist mentioned that it could be due to inflammation. However, recent TSH was within normal limits.  No biopsy is indicated per radiology.     Thank you,    Thomas Garcia MD  Family Medicine Physician  [facility]  [phone]

## 2020-03-19 NOTE — TELEPHONE ENCOUNTER
Phone Number Called: [phone] (home)       Call outcome: Did not leave a detailed message. Requested patient to call back.    Message: LVM for Pt to CB about Imaging Results

## 2020-03-23 ENCOUNTER — PHYSICAL THERAPY (OUTPATIENT)
Dept: PHYSICAL THERAPY | Facility: REHABILITATION | Age: 60
End: 2020-03-23
Attending: FAMILY MEDICINE
Payer: MEDICARE

## 2020-03-23 DIAGNOSIS — M25.512 LEFT SHOULDER PAIN, UNSPECIFIED CHRONICITY: ICD-10-CM

## 2020-03-23 DIAGNOSIS — M75.42 IMPINGEMENT SYNDROME OF LEFT SHOULDER: ICD-10-CM

## 2020-03-23 PROCEDURE — 97014 ELECTRIC STIMULATION THERAPY: CPT

## 2020-03-23 PROCEDURE — 97110 THERAPEUTIC EXERCISES: CPT

## 2020-03-23 PROCEDURE — 97140 MANUAL THERAPY 1/> REGIONS: CPT

## 2020-03-23 NOTE — TELEPHONE ENCOUNTER
Phone Number Called: [phone] (home)       Call outcome: Spoke to patient regarding message below.    Message: Pt informed, Pt understood, no questions at this time.

## 2020-03-23 NOTE — OP THERAPY DAILY TREATMENT
Outpatient Physical Therapy  DAILY TREATMENT     [facility] Outpatient Physical Therapy [address] [facility], [address]  [place] [address]  Phone:  [phone]    Date: 03/23/2020    Patient: Shahida Price  YOB: 1960  MRN: [id number]     Time Calculation  Start time: 1030  Stop time: 1100 Time Calculation (min): 30 minutes       Chief Complaint: Shoulder Problem    Visit #: 4    SUBJECTIVE:  The patient reports sleeping to the (L) side of her body as she reports cramping to (R) hand  She continues to have pain to the neck and shoulder of the (R) side. She changes positions often which can help along with using pillows for support.    OBJECTIVE:  Current objective measures:     - decrease pain to the (L) shoulder and increase mobility       Therapeutic Exercises (CPT 90874):     1. Sleepers stretch , 5 x30 sec    2. Hands to head ER, 1 x10 reps    3. Corner stretch, 3 x30 sec    4. Shoulder retraction lying , 5x for 10 sec    5. Shoulder shrugs, 1 x10 reps     6. Neck retractions , 1 x10 reps     7. Wall push ups, 1 x 6 reps, pt had pain with bilateral shoulders    Therapeutic Treatments and Modalities:     1. Manual Therapy (CPT 04172), Cervical, STM to the upper trapezius, levator scapula, rhomboids; PAMS to bilateral shoulders in flexion, abduction and ER/IR     2. E Stim Unattended (CPT 01927), C/s and (R) shoulder , IFC and MHP for 15 minutes     Time-based treatments/modalities:  Manual therapy minutes (CPT 07705): 15 minutes  Therapeutic exercise minutes (CPT 93446): 15 minutes         ASSESSMENT:   Response to treatment:   STM to the upper trapezius; PAMS to the (R) shoulder and (L) shoulder; increased pain with overhead flexion to the (R) shoulder; less in (L) shoulder flexion following treatment.    PLAN/RECOMMENDATIONS:   Plan for treatment: therapy treatment to continue next visit.  Planned interventions for next visit: continue with current treatment.

## 2020-03-25 ENCOUNTER — PHYSICAL THERAPY (OUTPATIENT)
Dept: PHYSICAL THERAPY | Facility: REHABILITATION | Age: 60
End: 2020-03-25
Attending: FAMILY MEDICINE
Payer: MEDICARE

## 2020-03-25 DIAGNOSIS — M25.512 LEFT SHOULDER PAIN, UNSPECIFIED CHRONICITY: ICD-10-CM

## 2020-03-25 DIAGNOSIS — M75.42 IMPINGEMENT SYNDROME OF LEFT SHOULDER: ICD-10-CM

## 2020-03-25 PROCEDURE — 97110 THERAPEUTIC EXERCISES: CPT

## 2020-03-25 PROCEDURE — 97014 ELECTRIC STIMULATION THERAPY: CPT

## 2020-03-27 ENCOUNTER — APPOINTMENT (OUTPATIENT)
Dept: PHYSICAL THERAPY | Facility: REHABILITATION | Age: 60
End: 2020-03-27
Attending: FAMILY MEDICINE
Payer: MEDICARE

## 2020-03-30 ENCOUNTER — APPOINTMENT (OUTPATIENT)
Dept: PHYSICAL THERAPY | Facility: REHABILITATION | Age: 60
End: 2020-03-30
Attending: FAMILY MEDICINE
Payer: MEDICARE

## 2020-03-30 NOTE — OP THERAPY DISCHARGE SUMMARY
Outpatient Physical Therapy  DISCHARGE SUMMARY NOTE      [facility] Physical Therapy [place] [address] [facility], [address]  [place] [address]  Phone:  [phone]    Date of Visit: 03/25/2020    Patient: Shahida Price  YOB: 1960  MRN: [id number]     Referring Provider: Brina Sanchez M.D.  [address] [place] [address]   Referring Diagnosis Pain in left shoulder [M25.512];Impingement syndrome of left shoulder [M75.42]     Physical Therapy Occurrence Codes    Date of onset of impairment:  2/20/20   Date physical therapy care plan established or reviewed:  3/10/20   Date physical therapy treatment started:  3/10/20          Functional Assessment Used        Your patient is being discharged from Physical Therapy with the following comments:   · Goals not met  · Objective findings inconsistent with subjective complaints, behavior and/or diagnosis    Comments:   The patient has attended 5 visits of PT however she continues to have pain to the (L) and (R) shoulders. She notes having pain in both shoulders.  The patient had mixed responses with special testing (+) Hawkin's, Neer, and crossover testing.        Limitations Remaining:  The patient has limitations in lifting her (L) arm above her head and shoulder and she cannot sleep on her (L) side at night. The patient observed sitting with (L) arm support in lap.  Upon palpation has increased tenderness to the (L) bicipital tendons and AROM limitations in flexion and abduction and ER. He also has more pain with returning her (L) arm from reaching behind her back. Weakness in (L) shoulder flexion, abduction and ER, reaching behind her low back and above her head.     Recommendations:  Follow up with PCP for further POC     Shawn Barrientos, PT    Date: 3/30/2020

## 2020-04-01 ENCOUNTER — APPOINTMENT (OUTPATIENT)
Dept: PHYSICAL THERAPY | Facility: REHABILITATION | Age: 60
End: 2020-04-01
Attending: FAMILY MEDICINE
Payer: MEDICARE

## 2020-05-13 ENCOUNTER — NON-PROVIDER VISIT (OUTPATIENT)
Dept: MEDICAL GROUP | Facility: MEDICAL CENTER | Age: 60
End: 2020-05-13
Attending: FAMILY MEDICINE
Payer: MEDICARE

## 2020-05-13 VITALS
WEIGHT: 140 LBS | SYSTOLIC BLOOD PRESSURE: 112 MMHG | BODY MASS INDEX: 25.61 KG/M2 | HEART RATE: 72 BPM | DIASTOLIC BLOOD PRESSURE: 82 MMHG

## 2020-05-13 DIAGNOSIS — E11.40 CONTROLLED TYPE 2 DIABETES MELLITUS WITH DIABETIC NEUROPATHY, WITHOUT LONG-TERM CURRENT USE OF INSULIN (HCC): ICD-10-CM

## 2020-05-13 DIAGNOSIS — E11.8 TYPE 2 DIABETES MELLITUS WITH COMPLICATION, WITHOUT LONG-TERM CURRENT USE OF INSULIN (HCC): Primary | ICD-10-CM

## 2020-05-13 LAB
HBA1C MFR BLD: 6.3 % (ref 0–5.6)
INT CON NEG: ABNORMAL
INT CON POS: ABNORMAL

## 2020-05-13 PROCEDURE — 83036 HEMOGLOBIN GLYCOSYLATED A1C: CPT

## 2020-05-13 RX ORDER — CALCIUM CITRATE/VITAMIN D3 200MG-6.25
TABLET ORAL
Qty: 50 STRIP | Refills: 11 | Status: SHIPPED | OUTPATIENT
Start: 2020-05-13 | End: 2021-06-28

## 2020-05-13 ASSESSMENT — FIBROSIS 4 INDEX: FIB4 SCORE: 0.89

## 2020-05-13 NOTE — PROGRESS NOTES
New Patient Consult Note 2:30-3:00 pm  Primary care physician: Thomas Garcia M.D.    Reason for consult: Management of Uncontrolled Type 2 Diabetes    HPI:  Shahida Price is a 59 y.o. old patient who comes in today for evaluation of above stated problem.    Most Recent HbA1c:   Lab Results   Component Value Date/Time    HBA1C 6.3 (A) 05/13/2020 02:40 PM    HBA1C 6.4 (H) 03/04/2020 09:20 AM    HBA1C 5.8 (A) 11/12/2019 03:10 PM     Current Diabetes Regimen:  SGLT-2 Inhibitor:  Empagliflozin 10 mg once daily   Metformin 500 mg BID      Before Breakfast: <110  Before Lunch:  Before Dinner:  Before Bedtime:  Other times:  Hypoglycemia:  None      ROS:  Constitutional: No weight loss  Cardiac: No palpitations or racing heart  Resp: No shortness of breath  Neuro: No numbness or tinging in feet  Endo: No heat or cold intolerance, no polyuria or polydipsia  All other systems were reviewed and were negative.    Past Medical History:  Patient Active Problem List    Diagnosis Date Noted   • Chest pain 08/17/2017     Priority: High   • Essential hypertension 04/17/2017     Priority: Medium   • Mood disorder (HCC) 04/17/2017     Priority: Medium   • Type 2 diabetes mellitus (HCC) 04/17/2017     Priority: Low   • Dyslipidemia 04/17/2017     Priority: Low   • Cough 03/02/2020   • Acquired hypothyroidism 03/02/2020   • Thyroid mass 03/02/2020   • Hemorrhoids 12/19/2017   • History of gastritis 04/17/2017   • Atypical chest pain 04/17/2017   • Obesity 04/17/2017   • Flexural eczema 04/17/2017   • Hair loss 04/17/2017   • Adhesive capsulitis of both shoulders 04/17/2017       Past Surgical History:  Past Surgical History:   Procedure Laterality Date   • ZZZ CARDIAC CATH  04/25/2018    normal coronaires   • CHOLECYSTECTOMY  2007   • OTHER ORTHOPEDIC SURGERY  2007    shoulder surgery       Allergies:  Shellfish allergy    Social History:  Social History     Socioeconomic History   • Marital status:      Spouse name: Not on  file   • Number of children: Not on file   • Years of education: Not on file   • Highest education level: Not on file   Occupational History   • Not on file   Social Needs   • Financial resource strain: Not on file   • Food insecurity     Worry: Not on file     Inability: Not on file   • Transportation needs     Medical: Not on file     Non-medical: Not on file   Tobacco Use   • Smoking status: Former Smoker     Packs/day: 1.00     Years: 15.00     Pack years: 15.00     Last attempt to quit: 2004     Years since quittin.3   • Smokeless tobacco: Never Used   Substance and Sexual Activity   • Alcohol use: Not Currently   • Drug use: Never   • Sexual activity: Yes     Partners: Male     Comment: menopause 2015   Lifestyle   • Physical activity     Days per week: Not on file     Minutes per session: Not on file   • Stress: Not on file   Relationships   • Social connections     Talks on phone: Not on file     Gets together: Not on file     Attends [employer] service: Not on file     Active member of club or organization: Not on file     Attends meetings of clubs or organizations: Not on file     Relationship status: Not on file   • Intimate partner violence     Fear of current or ex partner: Not on file     Emotionally abused: Not on file     Physically abused: Not on file     Forced sexual activity: Not on file   Other Topics Concern   • Not on file   Social History Narrative   • Not on file       Family History:  Family History   Problem Relation Age of Onset   • Cancer Mother         lung cancer, chronic smoker   • Lung Disease Mother    • Heart Attack Mother    • Diabetes Father        Medications:    Current Outpatient Medications:   •  glucose blood (TRUE METRIX BLOOD GLUCOSE TEST) strip, Test blood sugars once daily, Disp: 50 Strip, Rfl: 11  •  lisinopril (PRINIVIL) 10 MG Tab, TOME MEGGAN TABLETA TODOS LOS DRAPER, Disp: 90 Tab, Rfl: 1  •  metFORMIN (GLUCOPHAGE) 500 MG Tab, TOME MEGGAN TABLETA DOS VECES AL ROLAND CON LAS  COMIDAS, Disp: 180 Tab, Rfl: 0  •  Empagliflozin (JARDIANCE) 10 MG Tab, Take 1 Tab by mouth every day. TAKE 1 TAB BY MOUTH EVERY DAY., Disp: 30 Tab, Rfl: 3  •  albuterol 108 (90 Base) MCG/ACT Aero Soln inhalation aerosol, Inhale 2 Puffs by mouth every 6 hours as needed for Shortness of Breath., Disp: 8.5 g, Rfl: 6  •  atorvastatin (LIPITOR) 20 MG Tab, TOME MEGGAN TABLETA TODOS LOS DRAPER, Disp: 90 Tab, Rfl: 0  •  levothyroxine (SYNTHROID) 25 MCG Tab, Take 1 Tab by mouth every morning. ON A EMPTY STOMACH, Disp: 90 Tab, Rfl: 1  •  triamcinolone acetonide (KENALOG) 0.1 % Ointment, Apply 1 Application to affected area(s) 2 times a day as needed., Disp: 1 Tube, Rfl: 0  •  vitamin D, Ergocalciferol, (DRISDOL) 50962 units Cap capsule, Take 1 Cap by mouth every 28 days. (Patient not taking: Reported on 2/24/2020), Disp: 3 Cap, Rfl: 3  •  gabapentin (NEURONTIN) 300 MG Cap, Take 1 Cap by mouth 3 times a day. (Patient taking differently: Take 300 mg by mouth 3 times a day. Average 2 times per day), Disp: 270 Cap, Rfl: 1  •  cyclobenzaprine (FLEXERIL) 10 MG Tab, Take 1 Tab by mouth 3 times a day as needed., Disp: 30 Tab, Rfl: 3  •  aspirin (ASA) 81 MG Chew Tab chewable tablet, Take 1 Tab by mouth every day., Disp: 100 Tab, Rfl: 3  •  fluticasone (FLONASE) 50 MCG/ACT nasal spray, Spray 2 Sprays in nose every day., Disp: 16 g, Rfl: 11  •  Blood Glucose Monitoring Suppl (TRUE METRIX METER) w/Device Kit, USE ONCE DAILY TO TEST BLOOD SUGAR, Disp: 1 Kit, Rfl: 0  •  TRUEPLUS SAFETY LANCETS 28G Misc, , Disp: , Rfl:     Labs: Reviewed    Physical Examination:  Vital signs: /82   Pulse 72   Wt 63.5 kg (140 lb)   BMI 25.61 kg/m²  Body mass index is 25.61 kg/m².  General: No apparent distress, cooperative  Eyes: No scleral icterus or discharge  ENMT: Normal on external inspection of nose, lips, normal thyroid exam  Neck: No abnormal masses on inspection  Resp: Normal effort, clear to auscultation bilaterally   CVS: Regular rate and  rhythm, S1 S2 normal, no murmur   Extremities: No edema  Abdomen: abdominal obesity present  Neuro: Alert and oriented  Skin: No rash  Psych: Normal mood and affect, intact memory and able to make informed decisions    Assessment and Plan:    1. Type 2 diabetes mellitus with complication, without long-term current use of insulin (HCC)    CONTINUE CURRENT REGIMEN      Return in about 14 weeks (around 8/19/2020).    Thank you for allowing me to participate in the care of this patient.    Allison Walsh, PharmD  05/13/20    CC:   Thomas Garcia M.D.

## 2020-05-14 ENCOUNTER — TELEPHONE (OUTPATIENT)
Dept: MEDICAL GROUP | Facility: PHYSICIAN GROUP | Age: 60
End: 2020-05-14

## 2020-05-14 NOTE — TELEPHONE ENCOUNTER
Phone Number Called: [phone] (home)     Call outcome: Spoke to patient regarding message below.    Message: patient notified of results

## 2020-05-14 NOTE — TELEPHONE ENCOUNTER
----- Message from Thomas Garcia M.D. sent at 5/14/2020  9:09 AM PDT -----  Please call patient to let know:    Diabetes is well controlled with A1c at 6.3. This is lower than last time at 6.4. Continue with lifestyle changes.     Thank you,    Dr. YUMIKO Garcia  Family Medicine Physician  [facility]  [phone]

## 2020-05-25 DIAGNOSIS — E11.00 TYPE 2 DIABETES MELLITUS WITH HYPEROSMOLARITY WITHOUT COMA, WITHOUT LONG-TERM CURRENT USE OF INSULIN (HCC): ICD-10-CM

## 2020-06-08 RX ORDER — EMPAGLIFLOZIN 10 MG/1
1 TABLET, FILM COATED ORAL
Qty: 90 TAB | Refills: 0 | Status: SHIPPED | OUTPATIENT
Start: 2020-06-08 | End: 2020-06-23 | Stop reason: SDUPTHER

## 2020-06-08 NOTE — TELEPHONE ENCOUNTER
Received request via: Pharmacy    Was the patient seen in the last year in this department? Yes    Does the patient have an active prescription (recently filled or refills available) for medication(s) requested? No         Pharmacy is requesting 90 day supply

## 2020-06-23 ENCOUNTER — OFFICE VISIT (OUTPATIENT)
Dept: MEDICAL GROUP | Facility: PHYSICIAN GROUP | Age: 60
End: 2020-06-23
Payer: MEDICARE

## 2020-06-23 VITALS
BODY MASS INDEX: 26.35 KG/M2 | SYSTOLIC BLOOD PRESSURE: 118 MMHG | HEIGHT: 62 IN | TEMPERATURE: 98.3 F | RESPIRATION RATE: 16 BRPM | WEIGHT: 143.2 LBS | DIASTOLIC BLOOD PRESSURE: 80 MMHG | HEART RATE: 80 BPM | OXYGEN SATURATION: 94 %

## 2020-06-23 DIAGNOSIS — I20.89 STABLE ANGINA PECTORIS (HCC): ICD-10-CM

## 2020-06-23 DIAGNOSIS — F51.01 PRIMARY INSOMNIA: ICD-10-CM

## 2020-06-23 DIAGNOSIS — E11.00 TYPE 2 DIABETES MELLITUS WITH HYPEROSMOLARITY WITHOUT COMA, WITHOUT LONG-TERM CURRENT USE OF INSULIN (HCC): ICD-10-CM

## 2020-06-23 DIAGNOSIS — F41.9 ANXIETY: ICD-10-CM

## 2020-06-23 PROCEDURE — 99214 OFFICE O/P EST MOD 30 MIN: CPT | Performed by: FAMILY MEDICINE

## 2020-06-23 RX ORDER — RANOLAZINE 500 MG/1
TABLET, EXTENDED RELEASE ORAL
COMMUNITY
Start: 2020-06-18 | End: 2021-01-05

## 2020-06-23 RX ORDER — RANOLAZINE 500 MG/1
500 TABLET, EXTENDED RELEASE ORAL 2 TIMES DAILY
COMMUNITY
End: 2021-01-05

## 2020-06-23 RX ORDER — MELOXICAM 15 MG/1
TABLET ORAL
COMMUNITY
Start: 2020-03-31 | End: 2020-06-23 | Stop reason: SDUPTHER

## 2020-06-23 RX ORDER — EMPAGLIFLOZIN 10 MG/1
1 TABLET, FILM COATED ORAL
Qty: 90 TAB | Refills: 0 | Status: SHIPPED | OUTPATIENT
Start: 2020-06-23 | End: 2020-12-22

## 2020-06-23 RX ORDER — HYDROXYZINE HYDROCHLORIDE 25 MG/1
25 TABLET, FILM COATED ORAL 3 TIMES DAILY PRN
Qty: 30 TAB | Refills: 0 | Status: SHIPPED | OUTPATIENT
Start: 2020-06-23 | End: 2021-10-28

## 2020-06-23 RX ORDER — MELOXICAM 15 MG/1
15 TABLET ORAL DAILY
Qty: 30 TAB | Refills: 1 | Status: SHIPPED | OUTPATIENT
Start: 2020-06-23 | End: 2020-07-23

## 2020-06-23 ASSESSMENT — FIBROSIS 4 INDEX: FIB4 SCORE: 0.89

## 2020-06-23 ASSESSMENT — PATIENT HEALTH QUESTIONNAIRE - PHQ9: CLINICAL INTERPRETATION OF PHQ2 SCORE: 0

## 2020-06-23 NOTE — ASSESSMENT & PLAN NOTE
This is a new condition.     Symptom onset: The patient was in [place] and began to develop chest pain by the end of May.   Current symptoms: She was having chest pain radiating down her left arm.   Since onset symptoms are: Unchanged  Modifying factors: She addressed this to her cardiologist in [place] and had negative trops. TTE was normal. Had a cath - which was normal.   She would like to establish with a cardiologist in [place] now.   Treatments tried: She was started on Ranolazine 500mg daily.   Associated symptoms: Denies any SOB. Due to covid19 she has felt stressed and feels depressed.

## 2020-06-23 NOTE — PROGRESS NOTES
Subjective:     Chief Complaint   Patient presents with   • Follow-Up   • Referral Needed     cardiologist       HPI:   Shahida presents today to discuss the following.    Chest pain  This is a new condition.     Symptom onset: The patient was in [place] and began to develop chest pain by the end of May.   Current symptoms: She was having chest pain radiating down her left arm.   Since onset symptoms are: Unchanged  Modifying factors: She addressed this to her cardiologist in [place] and had negative trops. TTE was normal. Had a cath - which was normal.   She would like to establish with a cardiologist in [place] now.   Treatments tried: She was started on Ranolazine 500mg daily.   Associated symptoms: Denies any SOB. Due to covid19 she has felt stressed and feels depressed.         Past Medical History:   Diagnosis Date   • Allergy    • Atopic dermatitis    • Depression    • Diabetes (HCC)    • Gastritis    • Hyperlipidemia    • Hypertension        Social History     Tobacco Use   • Smoking status: Former Smoker     Packs/day: 1.00     Years: 15.00     Pack years: 15.00     Last attempt to quit:      Years since quittin.4   • Smokeless tobacco: Never Used   Substance Use Topics   • Alcohol use: Not Currently   • Drug use: Never       Current Outpatient Medications Ordered in Epic   Medication Sig Dispense Refill   • ranolazine (RANEXA) 500 MG TABLET SR 12 HR      • ranolazine (RANEXA) 500 MG TABLET SR 12 HR Take 500 mg by mouth 2 times a day.     • hydrOXYzine HCl (ATARAX) 25 MG Tab Take 1 Tab by mouth 3 times a day as needed for Itching. 30 Tab 0   • meloxicam (MOBIC) 15 MG tablet Take 1 Tab by mouth every day for 30 days. 30 Tab 1   • Empagliflozin (JARDIANCE) 10 MG Tab Take 1 Tab by mouth every day. TAKE 1 TAB BY MOUTH EVERY DAY. 90 Tab 0   • metFORMIN (GLUCOPHAGE) 500 MG Tab TOME MEGGAN TABLETA DOS VECES AL ROLAND CON LAS COMIDAS 180 Tab 0   • glucose blood (TRUE METRIX BLOOD GLUCOSE TEST) strip Test blood  "sugars once daily 50 Strip 11   • lisinopril (PRINIVIL) 10 MG Tab TOME MEGGAN TABLETA TODOS LOS DRAPER 90 Tab 1   • albuterol 108 (90 Base) MCG/ACT Aero Soln inhalation aerosol Inhale 2 Puffs by mouth every 6 hours as needed for Shortness of Breath. 8.5 g 6   • atorvastatin (LIPITOR) 20 MG Tab TOME MEGGAN TABLETA TODOS LOS DRAPER 90 Tab 0   • levothyroxine (SYNTHROID) 25 MCG Tab Take 1 Tab by mouth every morning. ON A EMPTY STOMACH 90 Tab 1   • triamcinolone acetonide (KENALOG) 0.1 % Ointment Apply 1 Application to affected area(s) 2 times a day as needed. 1 Tube 0   • vitamin D, Ergocalciferol, (DRISDOL) 57605 units Cap capsule Take 1 Cap by mouth every 28 days. 3 Cap 3   • gabapentin (NEURONTIN) 300 MG Cap Take 1 Cap by mouth 3 times a day. (Patient taking differently: Take 300 mg by mouth 3 times a day. Average 2 times per day) 270 Cap 1   • cyclobenzaprine (FLEXERIL) 10 MG Tab Take 1 Tab by mouth 3 times a day as needed. 30 Tab 3   • aspirin (ASA) 81 MG Chew Tab chewable tablet Take 1 Tab by mouth every day. 100 Tab 3   • fluticasone (FLONASE) 50 MCG/ACT nasal spray Spray 2 Sprays in nose every day. 16 g 11   • Blood Glucose Monitoring Suppl (TRUE METRIX METER) w/Device Kit USE ONCE DAILY TO TEST BLOOD SUGAR 1 Kit 0   • TRUEPLUS SAFETY LANCETS 28G Misc        No current Epic-ordered facility-administered medications on file.        Allergies:  Shellfish allergy    Health Maintenance: Completed    ROS:  Gen: no fevers/chills, no changes in weight  Eyes: no changes in vision  ENT: no sore throat, no hearing loss, no bloody nose  Pulm: no sob, no cough  CV: see HPI      Objective:     Exam:  /80 (BP Location: Left arm, Patient Position: Sitting, BP Cuff Size: Adult)   Pulse 80   Temp 36.8 °C (98.3 °F) (Temporal)   Resp 16   Ht 1.575 m (5' 2\")   Wt 65 kg (143 lb 3.2 oz)   SpO2 94%   BMI 26.19 kg/m²  Body mass index is 26.19 kg/m².    Constitutional: Alert, no distress, well-groomed.  Skin: Warm, dry, good turgor, " no rashes in visible areas.  Eye: Equal, round and reactive, conjunctiva clear, lids normal.  ENMT: Lips without lesions, good dentition, moist mucous membranes.  Neck: Trachea midline, no masses, no thyromegaly.  Respiratory: Unlabored respiratory effort, no cough.  MSK: Normal gait, moves all extremities.  Neuro: Grossly non-focal.   Psych: Alert and oriented x3, normal affect and mood.        Assessment & Plan:     59 y.o. female with the following -     1. Stable angina pectoris (HCC)  This is a new problem.  The patient began experiencing some chest pain concerning for cardiogenic etiology.  She was in [place] where she has a local cardiologist.  She immediately contacted him and was evaluated.  Tropes were negative TTE was negative but still had a cath which demonstrated clean coronaries according to the patient.  Upon further questioning the patient does endorse some anxiety and primary insomnia due to COVID-19.  I wonder if there is also a psychological component.  She would like to establish with a local cardiology for ongoing management.  See problems #2 #3.  - REFERRAL TO CARDIOLOGY  - hydrOXYzine HCl (ATARAX) 25 MG Tab; Take 1 Tab by mouth 3 times a day as needed for Itching.  Dispense: 30 Tab; Refill: 0  - meloxicam (MOBIC) 15 MG tablet; Take 1 Tab by mouth every day for 30 days.  Dispense: 30 Tab; Refill: 1    2. Anxiety  3. Primary insomnia  This is a new problem.  The patient has been experiencing some anxiety with primary insomnia due to COVID-19 in the recent past.  At this time I would like to do a trial of hydroxyzine to allow her to sleep better and also relieve her anxiety.  I did offer her a referral to behavioral health but she would like to hold off on this.  Return precautions were given today.  - hydrOXYzine HCl (ATARAX) 25 MG Tab; Take 1 Tab by mouth 3 times a day as needed for Itching.  Dispense: 30 Tab; Refill: 0    4. Type 2 diabetes mellitus with hyperosmolarity without coma,  without long-term current use of insulin (HCC)  This is a chronic condition.  His A1c is stable at 6.4.  She is requesting a refill for Jardiance today.  - Empagliflozin (JARDIANCE) 10 MG Tab; Take 1 Tab by mouth every day. TAKE 1 TAB BY MOUTH EVERY DAY.  Dispense: 90 Tab; Refill: 0      Return in about 6 months (around 12/23/2020) for FU on chest discomfort.    Please note that this dictation was created using voice recognition software. I have made every reasonable attempt to correct obvious errors, but I expect that there are errors of grammar and possibly content that I did not discover before finalizing the note.

## 2020-06-30 DIAGNOSIS — E11.00 TYPE 2 DIABETES MELLITUS WITH HYPEROSMOLARITY WITHOUT COMA, WITHOUT LONG-TERM CURRENT USE OF INSULIN (HCC): ICD-10-CM

## 2020-06-30 DIAGNOSIS — E78.5 DYSLIPIDEMIA: ICD-10-CM

## 2020-06-30 RX ORDER — ATORVASTATIN CALCIUM 20 MG/1
TABLET, FILM COATED ORAL
Qty: 90 TAB | Refills: 0 | Status: SHIPPED | OUTPATIENT
Start: 2020-06-30 | End: 2020-09-24

## 2020-09-01 DIAGNOSIS — I20.89 STABLE ANGINA PECTORIS (HCC): ICD-10-CM

## 2020-09-01 RX ORDER — MELOXICAM 15 MG/1
TABLET ORAL
Qty: 30 TAB | Refills: 1 | OUTPATIENT
Start: 2020-09-01

## 2020-09-01 NOTE — TELEPHONE ENCOUNTER
Received request via: Pharmacy    Was the patient seen in the last year in this department? Yes    Does the patient have an active prescription (recently filled or refills available) for medication(s) requested? No     Future Appointments       Provider Department [place]    10/8/2020 11:00 AM [facility] PHARMACIST [facility]    1/5/2021 8:40 AM Thomas Garcia M.D. [facility] [address]

## 2020-09-06 DIAGNOSIS — I10 ESSENTIAL HYPERTENSION: ICD-10-CM

## 2020-09-06 DIAGNOSIS — E78.5 DYSLIPIDEMIA: ICD-10-CM

## 2020-09-06 DIAGNOSIS — E11.8 TYPE 2 DIABETES MELLITUS WITH COMPLICATION, WITHOUT LONG-TERM CURRENT USE OF INSULIN (HCC): ICD-10-CM

## 2020-09-06 DIAGNOSIS — E11.00 TYPE 2 DIABETES MELLITUS WITH HYPEROSMOLARITY WITHOUT COMA, WITHOUT LONG-TERM CURRENT USE OF INSULIN (HCC): ICD-10-CM

## 2020-09-08 RX ORDER — LISINOPRIL 10 MG/1
TABLET ORAL
Qty: 90 TAB | Refills: 3 | Status: SHIPPED | OUTPATIENT
Start: 2020-09-08 | End: 2021-09-13

## 2020-09-14 DIAGNOSIS — E03.9 ACQUIRED HYPOTHYROIDISM: ICD-10-CM

## 2020-09-14 RX ORDER — LEVOTHYROXINE SODIUM 0.03 MG/1
25 TABLET ORAL EVERY MORNING
Qty: 90 TAB | Refills: 3 | Status: SHIPPED | OUTPATIENT
Start: 2020-09-14 | End: 2021-09-22

## 2020-10-08 ENCOUNTER — NON-PROVIDER VISIT (OUTPATIENT)
Dept: MEDICAL GROUP | Facility: MEDICAL CENTER | Age: 60
End: 2020-10-08
Attending: INTERNAL MEDICINE
Payer: MEDICARE

## 2020-10-08 VITALS
DIASTOLIC BLOOD PRESSURE: 64 MMHG | WEIGHT: 143 LBS | HEART RATE: 87 BPM | SYSTOLIC BLOOD PRESSURE: 118 MMHG | BODY MASS INDEX: 26.16 KG/M2

## 2020-10-08 DIAGNOSIS — E11.9 TYPE 2 DIABETES MELLITUS WITHOUT COMPLICATION, WITHOUT LONG-TERM CURRENT USE OF INSULIN (HCC): Primary | ICD-10-CM

## 2020-10-08 LAB
HBA1C MFR BLD: 6 % (ref 0–5.6)
INT CON NEG: ABNORMAL
INT CON POS: ABNORMAL

## 2020-10-08 PROCEDURE — 83036 HEMOGLOBIN GLYCOSYLATED A1C: CPT

## 2020-10-08 PROCEDURE — 99213 OFFICE O/P EST LOW 20 MIN: CPT | Performed by: PHARMACIST

## 2020-10-08 ASSESSMENT — FIBROSIS 4 INDEX: FIB4 SCORE: 0.9

## 2020-10-08 NOTE — PROGRESS NOTES
New Patient Consult Note 11:00-11:45  Primary care physician: Thomas Garcia M.D.    Reason for consult: Management of Uncontrolled Type 2 Diabetes    HPI:  Shahida Price is a 60 y.o. old patient who comes in today for evaluation of above stated problem.  Pt said she has been doing very well and exercising regularly. She tries to eat healthy all the time.    She is up to date on all of her labs and quality measures.    Most Recent HbA1c:   Lab Results   Component Value Date/Time    HBA1C 6.0 (A) 10/08/2020 11:20 AM    HBA1C 6.3 (A) 05/13/2020 02:40 PM    HBA1C 6.4 (H) 03/04/2020 09:20 AM     Current Diabetes Regimen:  SGLT-2 Inhibitor:  Empagliflozin 10 mg once daily   Metformin + SGLT-2 Inhibitor: 500 mg BID      Before Breakfast:<120  Before Lunch:  Before Dinner:  Before Bedtime:  Other times:  Hypoglycemia:  None      ROS:  Constitutional: No weight loss  Cardiac: No palpitations or racing heart  Resp: No shortness of breath  Neuro: No numbness or tinging in feet  Endo: No heat or cold intolerance, no polyuria or polydipsia  All other systems were reviewed and were negative.    Past Medical History:  Patient Active Problem List    Diagnosis Date Noted   • Chest pain 08/17/2017     Priority: High   • Essential hypertension 04/17/2017     Priority: Medium   • Mood disorder (HCC) 04/17/2017     Priority: Medium   • Type 2 diabetes mellitus (HCC) 04/17/2017     Priority: Low   • Dyslipidemia 04/17/2017     Priority: Low   • Cough 03/02/2020   • Acquired hypothyroidism 03/02/2020   • Thyroid mass 03/02/2020   • Hemorrhoids 12/19/2017   • History of gastritis 04/17/2017   • Atypical chest pain 04/17/2017   • Obesity 04/17/2017   • Flexural eczema 04/17/2017   • Hair loss 04/17/2017   • Adhesive capsulitis of both shoulders 04/17/2017       Past Surgical History:  Past Surgical History:   Procedure Laterality Date   • ZZZ CARDIAC CATH  04/25/2018    normal coronaires   • CHOLECYSTECTOMY  2007   • OTHER ORTHOPEDIC  SURGERY  2007    shoulder surgery       Allergies:  Shellfish allergy    Social History:  Social History     Socioeconomic History   • Marital status:      Spouse name: Not on file   • Number of children: Not on file   • Years of education: Not on file   • Highest education level: Not on file   Occupational History   • Not on file   Social Needs   • Financial resource strain: Not on file   • Food insecurity     Worry: Not on file     Inability: Not on file   • Transportation needs     Medical: Not on file     Non-medical: Not on file   Tobacco Use   • Smoking status: Former Smoker     Packs/day: 1.00     Years: 15.00     Pack years: 15.00     Quit date:      Years since quittin.7   • Smokeless tobacco: Never Used   Substance and Sexual Activity   • Alcohol use: Not Currently   • Drug use: Never   • Sexual activity: Yes     Partners: Male     Comment: menopause 2015   Lifestyle   • Physical activity     Days per week: Not on file     Minutes per session: Not on file   • Stress: Not on file   Relationships   • Social connections     Talks on phone: Not on file     Gets together: Not on file     Attends Alevism service: Not on file     Active member of club or organization: Not on file     Attends meetings of clubs or organizations: Not on file     Relationship status: Not on file   • Intimate partner violence     Fear of current or ex partner: Not on file     Emotionally abused: Not on file     Physically abused: Not on file     Forced sexual activity: Not on file   Other Topics Concern   • Not on file   Social History Narrative   • Not on file       Family History:  Family History   Problem Relation Age of Onset   • Cancer Mother         lung cancer, chronic smoker   • Lung Disease Mother    • Heart Attack Mother    • Diabetes Father        Medications:    Current Outpatient Medications:   •  atorvastatin (LIPITOR) 20 MG Tab, TOME MEGGAN TABLETA TODOS LOS DRAPER, Disp: 90 Tab, Rfl: 3  •  levothyroxine  (SYNTHROID) 25 MCG Tab, TAKE 1 TAB BY MOUTH EVERY MORNING ON A EMPTY STOMACH, Disp: 90 Tab, Rfl: 3  •  metFORMIN (GLUCOPHAGE) 500 MG Tab, TOME MEGGAN TABLETA DOS VECES AL ROLAND CON LAS COMIDAS, Disp: 180 Tab, Rfl: 3  •  lisinopril (PRINIVIL) 10 MG Tab, TOME MEGGAN TABLETA TODOS LOS DRAPER, Disp: 90 Tab, Rfl: 3  •  ranolazine (RANEXA) 500 MG TABLET SR 12 HR, , Disp: , Rfl:   •  ranolazine (RANEXA) 500 MG TABLET SR 12 HR, Take 500 mg by mouth 2 times a day., Disp: , Rfl:   •  hydrOXYzine HCl (ATARAX) 25 MG Tab, Take 1 Tab by mouth 3 times a day as needed for Itching., Disp: 30 Tab, Rfl: 0  •  Empagliflozin (JARDIANCE) 10 MG Tab, Take 1 Tab by mouth every day. TAKE 1 TAB BY MOUTH EVERY DAY., Disp: 90 Tab, Rfl: 0  •  glucose blood (TRUE METRIX BLOOD GLUCOSE TEST) strip, Test blood sugars once daily, Disp: 50 Strip, Rfl: 11  •  albuterol 108 (90 Base) MCG/ACT Aero Soln inhalation aerosol, Inhale 2 Puffs by mouth every 6 hours as needed for Shortness of Breath., Disp: 8.5 g, Rfl: 6  •  triamcinolone acetonide (KENALOG) 0.1 % Ointment, Apply 1 Application to affected area(s) 2 times a day as needed., Disp: 1 Tube, Rfl: 0  •  vitamin D, Ergocalciferol, (DRISDOL) 94798 units Cap capsule, Take 1 Cap by mouth every 28 days., Disp: 3 Cap, Rfl: 3  •  gabapentin (NEURONTIN) 300 MG Cap, Take 1 Cap by mouth 3 times a day. (Patient taking differently: Take 300 mg by mouth 3 times a day. Average 2 times per day), Disp: 270 Cap, Rfl: 1  •  cyclobenzaprine (FLEXERIL) 10 MG Tab, Take 1 Tab by mouth 3 times a day as needed., Disp: 30 Tab, Rfl: 3  •  aspirin (ASA) 81 MG Chew Tab chewable tablet, Take 1 Tab by mouth every day., Disp: 100 Tab, Rfl: 3  •  fluticasone (FLONASE) 50 MCG/ACT nasal spray, Spray 2 Sprays in nose every day., Disp: 16 g, Rfl: 11  •  Blood Glucose Monitoring Suppl (TRUE METRIX METER) w/Device Kit, USE ONCE DAILY TO TEST BLOOD SUGAR, Disp: 1 Kit, Rfl: 0  •  TRUEPLUS SAFETY LANCETS 28G Misc, , Disp: , Rfl:     Labs:  Reviewed    Physical Examination:  Vital signs: /64   Pulse 87   Wt 64.9 kg (143 lb)   BMI 26.16 kg/m²  Body mass index is 26.16 kg/m².  General: No apparent distress, cooperative  Eyes: No scleral icterus or discharge  ENMT: Normal on external inspection of nose, lips, normal thyroid exam  Neck: No abnormal masses on inspection  Resp: Normal effort, clear to auscultation bilaterally   CVS: Regular rate and rhythm, S1 S2 normal, no murmur   Extremities: No edema  Abdomen: abdominal obesity present  Neuro: Alert and oriented  Skin: No rash  Psych: Normal mood and affect, intact memory and able to make informed decisions    Assessment and Plan:    1. Type 2 diabetes mellitus without complication, without long-term current use of insulin (HCC)  Continue current regimen      Return in about 3 months (around 1/20/2021).    Thank you for allowing me to participate in the care of this patient.    Allison Walsh, Jamari BC-ADM Board Certified Advance Diabetes Specialist  10/08/20    CC:   Thomas Garcia M.D.

## 2020-12-22 DIAGNOSIS — E11.00 TYPE 2 DIABETES MELLITUS WITH HYPEROSMOLARITY WITHOUT COMA, WITHOUT LONG-TERM CURRENT USE OF INSULIN (HCC): ICD-10-CM

## 2020-12-22 RX ORDER — EMPAGLIFLOZIN 10 MG/1
TABLET, FILM COATED ORAL
Qty: 90 TAB | Refills: 0 | Status: SHIPPED | OUTPATIENT
Start: 2020-12-22 | End: 2021-04-20

## 2020-12-31 ENCOUNTER — TELEPHONE (OUTPATIENT)
Dept: MEDICAL GROUP | Facility: PHYSICIAN GROUP | Age: 60
End: 2020-12-31

## 2020-12-31 NOTE — TELEPHONE ENCOUNTER
ESTABLISHED PATIENT PRE-VISIT PLANNING     Patient was NOT contacted to complete PVP.     Note: Patient will not be contacted if there is no indication to call.     1.  Reviewed notes from the last few office visits within the medical group: Yes    2.  If any orders were placed at last visit or intended to be done for this visit (i.e. 6 mos follow-up), do we have Results/Consult Notes?         •  Labs - Labs were not ordered at last office visit.  Note: If patient appointment is for lab review and patient did not complete labs, check with provider if OK to reschedule patient until labs completed.       •  Imaging - Imaging was not ordered at last office visit.       •  Referrals - Referral ordered, patient has NOT been seen.    3. Is this appointment scheduled as a Hospital Follow-Up? No    4.  Immunizations were updated in Epic using Reconcile Outside Information activity? No    5.  Patient is due for the following Health Maintenance Topics:   Health Maintenance Due   Topic Date Due   • Annual Wellness Visit  1960   • HEPATITIS C SCREENING  1960   • IMM PNEUMOCOCCAL VACCINE: 0-64 Years (1 of 1 - PPSV23) 07/02/1967   • IMM HEP B VACCINE (1 of 3 - Risk 3-dose series) 07/02/1980   • IMM DTaP/Tdap/Td Vaccine (1 - Tdap) 07/02/1980   • IMM ZOSTER VACCINES (1 of 2) 07/02/2011   • URINE ACR / MICROALBUMIN  06/15/2019   • IMM INFLUENZA (1) 09/01/2020   • PAP SMEAR  10/24/2020   • DIABETES MONOFILAMENT / LE EXAM  11/12/2020   • MAMMOGRAM  01/02/2021       - Patient plans to schedule appointment for Annual Wellness Visit (AWV), Immunizations: FLU, HEPATITIS B, PNEUMOVAX (PPSV23), TDAP and SHINGRIX (Shingles) and Pap.    6.  AHA (Pulse8) form printed for Provider? N/A

## 2021-01-04 ENCOUNTER — OFFICE VISIT (OUTPATIENT)
Dept: CARDIOLOGY | Facility: MEDICAL CENTER | Age: 61
End: 2021-01-04
Payer: MEDICARE

## 2021-01-04 ENCOUNTER — TELEPHONE (OUTPATIENT)
Dept: CARDIOLOGY | Facility: MEDICAL CENTER | Age: 61
End: 2021-01-04

## 2021-01-04 VITALS
WEIGHT: 147 LBS | BODY MASS INDEX: 27.05 KG/M2 | HEIGHT: 62 IN | DIASTOLIC BLOOD PRESSURE: 68 MMHG | SYSTOLIC BLOOD PRESSURE: 110 MMHG | OXYGEN SATURATION: 96 % | HEART RATE: 86 BPM | RESPIRATION RATE: 14 BRPM

## 2021-01-04 DIAGNOSIS — I20.89 MICROVASCULAR ANGINA (HCC): ICD-10-CM

## 2021-01-04 DIAGNOSIS — E78.5 DYSLIPIDEMIA: ICD-10-CM

## 2021-01-04 DIAGNOSIS — I10 ESSENTIAL HYPERTENSION: ICD-10-CM

## 2021-01-04 LAB — EKG IMPRESSION: NORMAL

## 2021-01-04 PROCEDURE — 93000 ELECTROCARDIOGRAM COMPLETE: CPT | Performed by: INTERNAL MEDICINE

## 2021-01-04 PROCEDURE — 99214 OFFICE O/P EST MOD 30 MIN: CPT | Performed by: INTERNAL MEDICINE

## 2021-01-04 RX ORDER — ISOSORBIDE MONONITRATE 30 MG/1
30 TABLET, EXTENDED RELEASE ORAL EVERY MORNING
Qty: 30 TAB | Refills: 2 | Status: SHIPPED | OUTPATIENT
Start: 2021-01-04 | End: 2021-02-25

## 2021-01-04 RX ORDER — OMEPRAZOLE 40 MG/1
40 CAPSULE, DELAYED RELEASE ORAL
COMMUNITY
End: 2021-01-05

## 2021-01-04 RX ORDER — MELOXICAM 15 MG/1
15 TABLET ORAL DAILY
COMMUNITY
End: 2021-10-28

## 2021-01-04 ASSESSMENT — ENCOUNTER SYMPTOMS
DIZZINESS: 0
FLANK PAIN: 0
SORE THROAT: 0
NAUSEA: 0
BLOATING: 0
DECREASED APPETITE: 0
NIGHT SWEATS: 0
PALPITATIONS: 1
HEARTBURN: 1
CONSTIPATION: 0
IRREGULAR HEARTBEAT: 0
DIAPHORESIS: 0
SYNCOPE: 0
HEADACHES: 0
DOUBLE VISION: 0
SLEEP DISTURBANCES DUE TO BREATHING: 0
FALLS: 0
DYSPNEA ON EXERTION: 0
BLURRED VISION: 0
FEVER: 0
SHORTNESS OF BREATH: 0
NEAR-SYNCOPE: 0
COUGH: 0
PND: 0
ORTHOPNEA: 0
LOSS OF BALANCE: 0
MYALGIAS: 0
EXCESSIVE DAYTIME SLEEPINESS: 0
NUMBNESS: 0
BACK PAIN: 0
WEAKNESS: 0
LIGHT-HEADEDNESS: 0
PARESTHESIAS: 0
DIARRHEA: 0
VOMITING: 0
WHEEZING: 0

## 2021-01-04 ASSESSMENT — FIBROSIS 4 INDEX: FIB4 SCORE: 0.9

## 2021-01-04 NOTE — PROGRESS NOTES
Cardiology Follow-up Consultation Note    Date of note:    1/4/2021    Primary Care Provider: Thomas Garcia M.D.    Name:             Shahida Price     YOB: 1960  MRN:               [id number]    Chief Complaint   Patient presents with   • Hypertension       HISTORY OF PRESENT ILLNESS  Ms. Shahida Price is a 60 y.o. female with history of DM, HLD and HTN who returns to see us for follow-up of chest pain.    Patient was last seen in 2018.  Patient is new to me.    Interim History:  Patient was admitted at [facility] in 2018 with chest pain and underwent LHC which showed no CAD and EF 75%.  Most recently she was in [place] and had chest pain radiating down her arm.  She visited her cardiologist in [place] [facility] in 10/2020 and underwent repeat LHC which showed no CAD.    States that she has been having chest pain with radiation to the left arm when she walks her dogs.  Pain resolves after she rests for few minutes and takes deep breath.  Symptoms occur 3-4/week and happens when she is walking fast.  Exercises at home with stretches and does not experience chest pain with that.  Also reports chest pain with eating and feels that her food gets stuck in her chest and has to walk around to feel better.     She was started on ranolazine 500 mg daily in June 2020 but has not noticed any improvement in her symptoms.  Her cardiologist in [place] [facility] is Dr. Lars Davila at [facility].      Review of Systems   Constitution: Negative for decreased appetite, diaphoresis, fever, malaise/fatigue and night sweats.   HENT: Negative for congestion and sore throat.    Eyes: Negative for blurred vision and double vision.   Cardiovascular: Positive for chest pain and palpitations. Negative for cyanosis, dyspnea on exertion, irregular heartbeat, leg swelling, near-syncope, orthopnea, paroxysmal nocturnal dyspnea and syncope.   Respiratory: Negative for cough, shortness of breath, sleep disturbances due to  breathing and wheezing.    Endocrine: Negative for cold intolerance and heat intolerance.   Musculoskeletal: Negative for back pain, falls and myalgias.   Gastrointestinal: Positive for heartburn. Negative for bloating, constipation, diarrhea, nausea and vomiting.   Genitourinary: Negative for dysuria and flank pain.   Neurological: Negative for excessive daytime sleepiness, dizziness, headaches, light-headedness, loss of balance, numbness, paresthesias and weakness.   Allergic/Immunologic: Positive for environmental allergies.         Past Medical History:   Diagnosis Date   • Allergy    • Atopic dermatitis    • Depression    • Diabetes (HCC)    • Gastritis    • Hyperlipidemia    • Hypertension          Past Surgical History:   Procedure Laterality Date   • ZZ CARDIAC CATH  04/25/2018    normal coronaires   • CHOLECYSTECTOMY  2007   • OTHER ORTHOPEDIC SURGERY  2007    shoulder surgery         Current Outpatient Medications   Medication Sig Dispense Refill   • Ascorbic Acid (VITAMIN C PO) Take  by mouth.     • omeprazole (PRILOSEC) 40 MG delayed-release capsule Take 40 mg by mouth every 3 days as needed.     • meloxicam (MOBIC) 15 MG tablet Take 15 mg by mouth every day.     • isosorbide mononitrate SR (IMDUR) 30 MG TABLET SR 24 HR Take 1 Tab by mouth every morning. 30 Tab 2   • JARDIANCE 10 MG Tab TAKE 1 TAB BY MOUTH EVERY DAY. 90 Tab 0   • atorvastatin (LIPITOR) 20 MG Tab TOME MEGGAN TABLETA TODOS LOS DRAPER 90 Tab 3   • levothyroxine (SYNTHROID) 25 MCG Tab TAKE 1 TAB BY MOUTH EVERY MORNING ON A EMPTY STOMACH 90 Tab 3   • metFORMIN (GLUCOPHAGE) 500 MG Tab TOME MEGGAN TABLETA DOS VECES AL ROLAND CON LAS COMIDAS 180 Tab 3   • lisinopril (PRINIVIL) 10 MG Tab TOME MEGGAN TABLETA TODOS LOS DRAPER 90 Tab 3   • ranolazine (RANEXA) 500 MG TABLET SR 12 HR Take 500 mg by mouth 2 times a day.     • hydrOXYzine HCl (ATARAX) 25 MG Tab Take 1 Tab by mouth 3 times a day as needed for Itching. 30 Tab 0   • glucose blood (TRUE METRIX BLOOD  GLUCOSE TEST) strip Test blood sugars once daily 50 Strip 11   • albuterol 108 (90 Base) MCG/ACT Aero Soln inhalation aerosol Inhale 2 Puffs by mouth every 6 hours as needed for Shortness of Breath. 8.5 g 6   • triamcinolone acetonide (KENALOG) 0.1 % Ointment Apply 1 Application to affected area(s) 2 times a day as needed. 1 Tube 0   • vitamin D, Ergocalciferol, (DRISDOL) 86219 units Cap capsule Take 1 Cap by mouth every 28 days. 3 Cap 3   • aspirin (ASA) 81 MG Chew Tab chewable tablet Take 1 Tab by mouth every day. 100 Tab 3   • fluticasone (FLONASE) 50 MCG/ACT nasal spray Spray 2 Sprays in nose every day. 16 g 11   • Blood Glucose Monitoring Suppl (TRUE METRIX METER) w/Device Kit USE ONCE DAILY TO TEST BLOOD SUGAR 1 Kit 0   • TRUEPLUS SAFETY LANCETS 28G Misc      • ranolazine (RANEXA) 500 MG TABLET SR 12 HR      • gabapentin (NEURONTIN) 300 MG Cap Take 1 Cap by mouth 3 times a day. (Patient not taking: Reported on 1/4/2021) 270 Cap 1   • cyclobenzaprine (FLEXERIL) 10 MG Tab Take 1 Tab by mouth 3 times a day as needed. (Patient not taking: Reported on 1/4/2021) 30 Tab 3     No current facility-administered medications for this visit.          Allergies   Allergen Reactions   • Shellfish Allergy Hives and Rash     Shellfish         Family History   Problem Relation Age of Onset   • Cancer Mother         lung cancer, chronic smoker   • Lung Disease Mother    • Heart Attack Mother    • Diabetes Father          Social History     Socioeconomic History   • Marital status:      Spouse name: Not on file   • Number of children: Not on file   • Years of education: Not on file   • Highest education level: Not on file   Occupational History   • Not on file   Social Needs   • Financial resource strain: Not on file   • Food insecurity     Worry: Not on file     Inability: Not on file   • Transportation needs     Medical: Not on file     Non-medical: Not on file   Tobacco Use   • Smoking status: Former Smoker      "Packs/day: 1.00     Years: 15.00     Pack years: 15.00     Quit date:      Years since quittin.0   • Smokeless tobacco: Never Used   Substance and Sexual Activity   • Alcohol use: Not Currently   • Drug use: Never   • Sexual activity: Yes     Partners: Male     Comment: menopause 2015   Lifestyle   • Physical activity     Days per week: Not on file     Minutes per session: Not on file   • Stress: Not on file   Relationships   • Social connections     Talks on phone: Not on file     Gets together: Not on file     Attends Baptism service: Not on file     Active member of club or organization: Not on file     Attends meetings of clubs or organizations: Not on file     Relationship status: Not on file   • Intimate partner violence     Fear of current or ex partner: Not on file     Emotionally abused: Not on file     Physically abused: Not on file     Forced sexual activity: Not on file   Other Topics Concern   • Not on file   Social History Narrative   • Not on file         Physical Exam:  Ambulatory Vitals  /68 (BP Location: Right arm, Patient Position: Sitting, BP Cuff Size: Adult)   Pulse 86   Resp 14   Ht 1.575 m (5' 2\")   Wt 66.7 kg (147 lb)   SpO2 96%    Oxygen Therapy:  Pulse Oximetry: 96 %  BP Readings from Last 4 Encounters:   21 110/68   10/08/20 118/64   20 118/80   20 112/82       Weight/BMI: Body mass index is 26.89 kg/m².  Wt Readings from Last 4 Encounters:   21 66.7 kg (147 lb)   10/08/20 64.9 kg (143 lb)   20 65 kg (143 lb 3.2 oz)   20 63.5 kg (140 lb)       GEN: Well developed, well nourished and in no acute distress.  HEART: no significant JVD, regular rate and rhythm, normal S1 and S2, no murmurs, no third heart sounds, normal cardiac palpation  LUNG: clear to auscultation bilaterally, no wheezing, no crackles, normal respiratory effort on room air  ABDOMEN: soft, non-tender, non-distended, normal bowel sounds throughout  EXTREMITIES: no " peripheral edema noted  VASCULAR: no significantly elevated jugular venous pressure, no carotid bruits noted, radial pulses 2+ and equal      Lab Data Review:  Lab Results   Component Value Date/Time    CHOLSTRLTOT 129 03/04/2020 09:20 AM    LDL 57 03/04/2020 09:20 AM    HDL 46 03/04/2020 09:20 AM    TRIGLYCERIDE 131 03/04/2020 09:20 AM       Lab Results   Component Value Date/Time    SODIUM 137 03/04/2020 09:20 AM    POTASSIUM 4.3 03/04/2020 09:20 AM    CHLORIDE 102 03/04/2020 09:20 AM    CO2 26 03/04/2020 09:20 AM    GLUCOSE 112 (H) 03/04/2020 09:20 AM    BUN 12 03/04/2020 09:20 AM    CREATININE 0.68 03/04/2020 09:20 AM     Lab Results   Component Value Date/Time    ALKPHOSPHAT 92 07/26/2019 09:18 AM    ASTSGOT 19 07/26/2019 09:18 AM    ALTSGPT 25 07/26/2019 09:18 AM    TBILIRUBIN 0.4 07/26/2019 09:18 AM      Lab Results   Component Value Date/Time    WBC 5.1 03/04/2020 09:20 AM     Lab Results   Component Value Date/Time    HBA1C 6.0 (A) 10/08/2020 11:20 AM    HBA1C 6.3 (A) 05/13/2020 02:40 PM       Cardiac Imaging and Procedures Review:    EKG dated 1/4/2021: My personal interpretation is sinus rhythm with heart rate 86 bpm, inferior infarct finding present    Echo dated 2017:   CONCLUSIONS  Normal left ventricular systolic function.  Left ventricular ejection fraction is visually estimated to be 70%.  Grade I diastolic dysfunction.  The right ventricle was normal in size and function.  No significant valve disease or flow abnormalities.     Nuclear Perfusion Imaging (2017):    Report   NUCLEAR IMAGING INTERPRETATION   Normal myocardial perfusion with no ischemia.   Normal left ventricular wall motion.  LV ejection fraction = 70%.   ECG INTERPRETATION   Negative stress ECG for ischemia.    [facility] (2018):   IMPRESSION:  1.  Normal resting hemodynamics.  2.  Normal coronary angiography.  3.  Normal left ventricular ejection fraction of 75%.    Radiology test Review:  Neck Ultrasound (2020): IMPRESSION:  1.  Mass in  right lobe of thyroid gland seen on prior exam is not appreciated on the current exam.  2.  No new significant thyroid nodules are identified.  3.  Thyroid echogenicity is heterogeneous which could indicate thyroiditis.      Assessment & Plan     1. Microvascular angina (HCC)  isosorbide mononitrate SR (IMDUR) 30 MG TABLET SR 24 HR   2. Essential hypertension  EKG   3. Dyslipidemia         Ms. Price has undergone two LHC in the past 2 years with no evidence of CAD, however, her symptoms are concerning for angina.  Ranolazine has not provided any symptomatic relief, hence, recommend discontinuing it.  Trial of imdur 30 mg.  Discussed adverse effects with the patient including but not limited to headache, low blood pressure to which she voices understanding and will reach back to us if she experiences any.    Blood pressure well controlled.  Continue lisinopril.    Continue lipitor.  Repeat lipid panel at next visit.    All of patient's excellent questions were answered to the best of my knowledge and to her satisfaction.  It was a pleasure seeing Ms. Shahida Price in my clinic today. Return in about 3 months (around 4/4/2021). Patient is aware to call the cardiology clinic with any questions or concerns.      Lew Hooks MD  [facility]  [facility], [address]  [address] [address]  Phone: [phone]  Fax: [phone]

## 2021-01-04 NOTE — TELEPHONE ENCOUNTER
Faxed STAT records request to patient's prior cardiologist, Dr. Lars Davila at [facility], for patient's prior cardiac records (F: [phone], P: [phone]). Fax confirmation received.

## 2021-01-05 ENCOUNTER — TELEMEDICINE (OUTPATIENT)
Dept: MEDICAL GROUP | Facility: PHYSICIAN GROUP | Age: 61
End: 2021-01-05
Payer: MEDICARE

## 2021-01-05 VITALS — BODY MASS INDEX: 27.05 KG/M2 | HEIGHT: 62 IN | WEIGHT: 147 LBS

## 2021-01-05 DIAGNOSIS — L30.9 ECZEMA, UNSPECIFIED TYPE: ICD-10-CM

## 2021-01-05 DIAGNOSIS — E11.9 TYPE 2 DIABETES MELLITUS WITHOUT COMPLICATION, WITHOUT LONG-TERM CURRENT USE OF INSULIN (HCC): ICD-10-CM

## 2021-01-05 DIAGNOSIS — E03.9 ACQUIRED HYPOTHYROIDISM: ICD-10-CM

## 2021-01-05 DIAGNOSIS — I20.89 STABLE ANGINA PECTORIS (HCC): ICD-10-CM

## 2021-01-05 PROCEDURE — 99214 OFFICE O/P EST MOD 30 MIN: CPT | Mod: 95,CR | Performed by: FAMILY MEDICINE

## 2021-01-05 RX ORDER — ALBUTEROL SULFATE 90 UG/1
2 AEROSOL, METERED RESPIRATORY (INHALATION) EVERY 6 HOURS PRN
Qty: 8.5 G | Refills: 6 | Status: SHIPPED | OUTPATIENT
Start: 2021-01-05 | End: 2022-01-17

## 2021-01-05 RX ORDER — FLUTICASONE PROPIONATE 50 MCG
2 SPRAY, SUSPENSION (ML) NASAL DAILY
Qty: 16 G | Refills: 11 | Status: SHIPPED | OUTPATIENT
Start: 2021-01-05

## 2021-01-05 RX ORDER — TRIAMCINOLONE ACETONIDE 1 MG/G
1 OINTMENT TOPICAL 2 TIMES DAILY PRN
Qty: 80 G | Refills: 3 | Status: SHIPPED | OUTPATIENT
Start: 2021-01-05 | End: 2021-01-05

## 2021-01-05 RX ORDER — AMMONIUM LACTATE 12 G/100G
LOTION TOPICAL
Qty: 567 G | Refills: 3 | Status: SHIPPED | OUTPATIENT
Start: 2021-01-05 | End: 2021-12-02

## 2021-01-05 RX ORDER — OMEPRAZOLE 40 MG/1
40 CAPSULE, DELAYED RELEASE ORAL DAILY
Qty: 90 CAP | Refills: 3 | Status: SHIPPED | OUTPATIENT
Start: 2021-01-05 | End: 2022-01-20

## 2021-01-05 RX ORDER — ISOSORBIDE MONONITRATE 30 MG/1
30 TABLET, EXTENDED RELEASE ORAL EVERY MORNING
COMMUNITY
End: 2021-01-05

## 2021-01-05 ASSESSMENT — FIBROSIS 4 INDEX: FIB4 SCORE: 0.9

## 2021-01-05 NOTE — PROGRESS NOTES
Virtual Visit: Established Patient   This visit was conducted via Zoom using secure and encrypted videoconferencing technology. The patient was in a private location in the state of [place].    The patient's identity was confirmed and verbal consent was obtained for this virtual visit.    Subjective:   CC:   Chief Complaint   Patient presents with   • Follow-Up       Shahida Price is a 60 y.o. female presenting for evaluation and management of:    #Stable angina  This is a chronic issue  Following up with cardiology with Dr Cruz   Now on a trial of Imdur 30mg daily    #DMT2  Last A1c 10/20 at 6.0  On Jardiance 10mg daily and metformin 500mg BID  Saw ophthalmology in the last 6 mo     #Hypothyroidism  This is a chronic issue  On levothyroxine 25mcgs daily     ROS   Denies any recent fevers or chills. No nausea or vomiting. No chest pains or shortness of breath.     Allergies   Allergen Reactions   • Shellfish Allergy Hives and Rash     Shellfish       Current medicines (including changes today)  Current Outpatient Medications   Medication Sig Dispense Refill   • fluticasone (FLONASE) 50 MCG/ACT nasal spray Administer 2 Sprays into affected nostril(S) every day. 16 g 11   • omeprazole (PRILOSEC) 40 MG delayed-release capsule Take 1 Cap by mouth every day. 90 Cap 3   • albuterol 108 (90 Base) MCG/ACT Aero Soln inhalation aerosol Inhale 2 Puffs every 6 hours as needed for Shortness of Breath. 8.5 g 6   • ammonium lactate (LAC-HYDRIN) 12 % Lotion Apply thin layer to affected area (arms) twice daily as needed for 14 days 567 g 3   • Ascorbic Acid (VITAMIN C PO) Take  by mouth.     • meloxicam (MOBIC) 15 MG tablet Take 15 mg by mouth every day.     • isosorbide mononitrate SR (IMDUR) 30 MG TABLET SR 24 HR Take 1 Tab by mouth every morning. 30 Tab 2   • JARDIANCE 10 MG Tab TAKE 1 TAB BY MOUTH EVERY DAY. 90 Tab 0   • atorvastatin (LIPITOR) 20 MG Tab TOME MEGGAN TABLETA TODOS LOS DRAPER 90 Tab 3   • levothyroxine (SYNTHROID) 25  MCG Tab TAKE 1 TAB BY MOUTH EVERY MORNING ON A EMPTY STOMACH 90 Tab 3   • metFORMIN (GLUCOPHAGE) 500 MG Tab TOME MEGGAN TABLETA DOS VECES AL ROLAND CON LAS COMIDAS 180 Tab 3   • lisinopril (PRINIVIL) 10 MG Tab TOME MEGGAN TABLETA TODOS LOS DRAPER 90 Tab 3   • hydrOXYzine HCl (ATARAX) 25 MG Tab Take 1 Tab by mouth 3 times a day as needed for Itching. 30 Tab 0   • glucose blood (TRUE METRIX BLOOD GLUCOSE TEST) strip Test blood sugars once daily 50 Strip 11   • vitamin D, Ergocalciferol, (DRISDOL) 13211 units Cap capsule Take 1 Cap by mouth every 28 days. 3 Cap 3   • cyclobenzaprine (FLEXERIL) 10 MG Tab Take 1 Tab by mouth 3 times a day as needed. 30 Tab 3   • aspirin (ASA) 81 MG Chew Tab chewable tablet Take 1 Tab by mouth every day. 100 Tab 3   • Blood Glucose Monitoring Suppl (TRUE METRIX METER) w/Device Kit USE ONCE DAILY TO TEST BLOOD SUGAR 1 Kit 0   • TRUEPLUS SAFETY LANCETS 28G Misc        No current facility-administered medications for this visit.        Patient Active Problem List    Diagnosis Date Noted   • Chest pain 08/17/2017     Priority: High   • Essential hypertension 04/17/2017     Priority: Medium   • Mood disorder (HCC) 04/17/2017     Priority: Medium   • Type 2 diabetes mellitus (HCC) 04/17/2017     Priority: Low   • Dyslipidemia 04/17/2017     Priority: Low   • Cough 03/02/2020   • Acquired hypothyroidism 03/02/2020   • Thyroid mass 03/02/2020   • Hemorrhoids 12/19/2017   • History of gastritis 04/17/2017   • Atypical chest pain 04/17/2017   • Obesity 04/17/2017   • Flexural eczema 04/17/2017   • Hair loss 04/17/2017   • Adhesive capsulitis of both shoulders 04/17/2017       Family History   Problem Relation Age of Onset   • Cancer Mother         lung cancer, chronic smoker   • Lung Disease Mother    • Heart Attack Mother    • Diabetes Father        She  has a past medical history of Allergy, Atopic dermatitis, Depression, Diabetes (HCC), Gastritis, Hyperlipidemia, and Hypertension.  She  has a past surgical  "history that includes cholecystectomy (2007); other orthopedic surgery (2007); and [facility] cardiac cath (04/25/2018).       Objective:   Ht 1.575 m (5' 2\") Comment: pt reported  Wt 66.7 kg (147 lb) Comment: pt reported  BMI 26.89 kg/m²     Physical Exam:  Constitutional: Alert, no distress, well-groomed.  Skin: No rashes in visible areas.  Eye: Round. Conjunctiva clear, lids normal. No icterus.   ENMT: Lips pink without lesions, good dentition, moist mucous membranes. Phonation normal.  Neck: No masses, no thyromegaly. Moves freely without pain.  Respiratory: Unlabored respiratory effort, no cough or audible wheeze  Psych: Alert and oriented x3, normal affect and mood.       Assessment and Plan:   The following treatment plan was discussed:     1. Type 2 diabetes mellitus without complication, without long-term current use of insulin (HCC)  This is a chronic, stable condition.  Well-controlled.  Managed by pharmacy.  Last A1c at 6.0 October 2020 with Jardiance and Metformin  On statin and ACE inhibitor  Follows up with ophthalmology  Diabetic foot exam within the last 12 months  - CBC WITHOUT DIFFERENTIAL; Future  - Comp Metabolic Panel; Future  - Lipid Profile; Future  - TSH WITH REFLEX TO FT4; Future    2. Eczema, unspecified type  This is a chronic, stable condition.  Ammonium lactate lotion helps.  Refills requested today.    3. Stable angina pectoris (HCC)  This is a chronic, stable condition.  Follows up with cardiology for this.  Trial of Imdur at this time.    4. Acquired hypothyroidism  This is a chronic, stable condition.  Due for repeat labs March 2021.    Other orders  - fluticasone (FLONASE) 50 MCG/ACT nasal spray; Administer 2 Sprays into affected nostril(S) every day.  Dispense: 16 g; Refill: 11  - omeprazole (PRILOSEC) 40 MG delayed-release capsule; Take 1 Cap by mouth every day.  Dispense: 90 Cap; Refill: 3  - albuterol 108 (90 Base) MCG/ACT Aero Soln inhalation aerosol; Inhale 2 Puffs every 6 hours " as needed for Shortness of Breath.  Dispense: 8.5 g; Refill: 6  - ammonium lactate (LAC-HYDRIN) 12 % Lotion; Apply thin layer to affected area (arms) twice daily as needed for 14 days  Dispense: 567 g; Refill: 3        Follow-up: Return in about 6 months (around 7/5/2021).

## 2021-01-20 ENCOUNTER — NON-PROVIDER VISIT (OUTPATIENT)
Dept: MEDICAL GROUP | Facility: MEDICAL CENTER | Age: 61
End: 2021-01-20
Attending: INTERNAL MEDICINE
Payer: MEDICARE

## 2021-01-20 VITALS
SYSTOLIC BLOOD PRESSURE: 121 MMHG | BODY MASS INDEX: 26.52 KG/M2 | WEIGHT: 145 LBS | DIASTOLIC BLOOD PRESSURE: 69 MMHG | HEART RATE: 92 BPM

## 2021-01-20 DIAGNOSIS — E11.9 TYPE 2 DIABETES MELLITUS WITHOUT COMPLICATION, WITHOUT LONG-TERM CURRENT USE OF INSULIN (HCC): ICD-10-CM

## 2021-01-20 LAB
HBA1C MFR BLD: 6.6 % (ref 0–5.6)
INT CON NEG: ABNORMAL
INT CON POS: ABNORMAL

## 2021-01-20 PROCEDURE — 99213 OFFICE O/P EST LOW 20 MIN: CPT | Performed by: INTERNAL MEDICINE

## 2021-01-20 PROCEDURE — 83036 HEMOGLOBIN GLYCOSYLATED A1C: CPT

## 2021-01-20 RX ORDER — RANOLAZINE 500 MG/1
500 TABLET, EXTENDED RELEASE ORAL 2 TIMES DAILY
COMMUNITY
End: 2021-07-01

## 2021-01-20 ASSESSMENT — FIBROSIS 4 INDEX: FIB4 SCORE: 0.9

## 2021-01-20 NOTE — NON-PROVIDER
Patient Consult Note    TIME IN: 11:00 am  TIME OUT: 11:45 am    Primary care physician: Thomas Garcia M.D.    Reason for consult: Management of Uncontrolled Type 2 Diabetes    HPI:  Shahida Price is a 60 y.o. old patient who comes in today for evaluation of above stated problem.    At her last visit, she was continued on her current regimen and had been doing very well w/ lifestyle modification.    Since her last appt, pt states that she has been eating more pastries over the holidays but is no longer doing this. She also stated that she was traveling and that her meal times were later in the day. Pt denied any issues w/ her medications at this time and reports complete adherence to her DM therapy.    Most Recent HbA1c:   Lab Results   Component Value Date/Time    HBA1C 6.6 (A) 01/20/2021 11:17 AM      Lab Results   Component Value Date/Time    CREATININE 0.68 03/04/2020 09:20 AM        Diabetes Medication History and Current Regimen  Metformin: 500 mg BID   SGLT-2 Inhibitor:  Empagliflozin 10 mg once daily     Pt has home glucometer and proper testing technique - Yes    Pt reports blood sugars: (Discussed goals: FBG , 2hPP < 180)  Before Breakfast: 111, 101, 98, 127    Hypoglycemia awareness - Yes  Nocturnal hypoglycemia- Denies  Hypoglycemia:  None    Pt's treatment of Hypoglycemia - 15:15 Rule    Current Exercise - Pt states she works out at home daily for ~15 mins  Exercise Goal - Increase to 150 mins per week    Dietary - White rice - daily, soup, 1 slice of bread, eggs, chicken, fruit (banana, orange, sylvie - only eats 2-3x/week), potato 2-3x/week, salad, holiday pastries - she has since cut these out. Pt states that she has very moderate portion sizes    Foot Exam:  Monofilament exam - Right foot 10/10, Left foot 7/10  Monofilament testing with a 10 gram force: sensation intact: decreased bilaterally.    Visual Inspection: Feet without maceration, ulcers, fissures.  Feet dry.  Pedal pulses: intact  bilaterally    Preventative Management  BP regimen (ACE/ARB) - Lisinopril 10 mg once daily  ASA - 81 mg once daily  Statin - Atorvastatin 20 mg once daily  Last Retinal Scan - 2020. Pt to schedule f/u  Last Foot Exam - Pt checks daily  Last A1c -   Lab Results   Component Value Date/Time    HBA1C 6.6 (A) 2021 11:17 AM      Last Microalbuminuria - <0.7 (10/2017) - Needs updating    Past Medical History:  Patient Active Problem List    Diagnosis Date Noted   • Chest pain 2017     Priority: High   • Essential hypertension 2017     Priority: Medium   • Mood disorder (HCC) 2017     Priority: Medium   • Type 2 diabetes mellitus (HCC) 2017     Priority: Low   • Dyslipidemia 2017     Priority: Low   • Cough 2020   • Acquired hypothyroidism 2020   • Thyroid mass 2020   • Hemorrhoids 2017   • History of gastritis 2017   • Atypical chest pain 2017   • Obesity 2017   • Flexural eczema 2017   • Hair loss 2017   • Adhesive capsulitis of both shoulders 2017       Past Surgical History:  Past Surgical History:   Procedure Laterality Date   • ZZZ CARDIAC CATH  2018    normal coronaires   • CHOLECYSTECTOMY     • OTHER ORTHOPEDIC SURGERY      shoulder surgery       Allergies:  Shellfish allergy    Social History:  Social History     Socioeconomic History   • Marital status:      Spouse name: Not on file   • Number of children: Not on file   • Years of education: Not on file   • Highest education level: Not on file   Occupational History   • Not on file   Social Needs   • Financial resource strain: Not on file   • Food insecurity     Worry: Not on file     Inability: Not on file   • Transportation needs     Medical: Not on file     Non-medical: Not on file   Tobacco Use   • Smoking status: Former Smoker     Packs/day: 1.00     Years: 15.00     Pack years: 15.00     Quit date:      Years since quittin.0   •  Smokeless tobacco: Never Used   Substance and Sexual Activity   • Alcohol use: Not Currently   • Drug use: Never   • Sexual activity: Yes     Partners: Male     Comment: menopause 2015   Lifestyle   • Physical activity     Days per week: Not on file     Minutes per session: Not on file   • Stress: Not on file   Relationships   • Social connections     Talks on phone: Not on file     Gets together: Not on file     Attends Denominational service: Not on file     Active member of club or organization: Not on file     Attends meetings of clubs or organizations: Not on file     Relationship status: Not on file   • Intimate partner violence     Fear of current or ex partner: Not on file     Emotionally abused: Not on file     Physically abused: Not on file     Forced sexual activity: Not on file   Other Topics Concern   • Not on file   Social History Narrative   • Not on file       Family History:  Family History   Problem Relation Age of Onset   • Cancer Mother         lung cancer, chronic smoker   • Lung Disease Mother    • Heart Attack Mother    • Diabetes Father        Medications:    Current Outpatient Medications:   •  ranolazine (RANEXA) 500 MG TABLET SR 12 HR, Take 500 mg by mouth 2 times a day., Disp: , Rfl:   •  fluticasone (FLONASE) 50 MCG/ACT nasal spray, Administer 2 Sprays into affected nostril(S) every day., Disp: 16 g, Rfl: 11  •  omeprazole (PRILOSEC) 40 MG delayed-release capsule, Take 1 Cap by mouth every day., Disp: 90 Cap, Rfl: 3  •  albuterol 108 (90 Base) MCG/ACT Aero Soln inhalation aerosol, Inhale 2 Puffs every 6 hours as needed for Shortness of Breath., Disp: 8.5 g, Rfl: 6  •  ammonium lactate (LAC-HYDRIN) 12 % Lotion, Apply thin layer to affected area (arms) twice daily as needed for 14 days, Disp: 567 g, Rfl: 3  •  Ascorbic Acid (VITAMIN C PO), Take  by mouth., Disp: , Rfl:   •  meloxicam (MOBIC) 15 MG tablet, Take 15 mg by mouth every day., Disp: , Rfl:   •  isosorbide mononitrate SR (IMDUR) 30 MG  TABLET SR 24 HR, Take 1 Tab by mouth every morning., Disp: 30 Tab, Rfl: 2  •  JARDIANCE 10 MG Tab, TAKE 1 TAB BY MOUTH EVERY DAY., Disp: 90 Tab, Rfl: 0  •  atorvastatin (LIPITOR) 20 MG Tab, TOME MEGGAN TABLETA TODOS LOS DRAPER, Disp: 90 Tab, Rfl: 3  •  metFORMIN (GLUCOPHAGE) 500 MG Tab, TOME MEGGAN TABLETA DOS VECES AL ROLAND CON LAS COMIDAS, Disp: 180 Tab, Rfl: 3  •  lisinopril (PRINIVIL) 10 MG Tab, TOME MEGGAN TABLETA TODOS LOS DRAPER, Disp: 90 Tab, Rfl: 3  •  hydrOXYzine HCl (ATARAX) 25 MG Tab, Take 1 Tab by mouth 3 times a day as needed for Itching., Disp: 30 Tab, Rfl: 0  •  glucose blood (TRUE METRIX BLOOD GLUCOSE TEST) strip, Test blood sugars once daily, Disp: 50 Strip, Rfl: 11  •  cyclobenzaprine (FLEXERIL) 10 MG Tab, Take 1 Tab by mouth 3 times a day as needed., Disp: 30 Tab, Rfl: 3  •  aspirin (ASA) 81 MG Chew Tab chewable tablet, Take 1 Tab by mouth every day., Disp: 100 Tab, Rfl: 3  •  Blood Glucose Monitoring Suppl (TRUE METRIX METER) w/Device Kit, USE ONCE DAILY TO TEST BLOOD SUGAR, Disp: 1 Kit, Rfl: 0  •  TRUEPLUS SAFETY LANCETS 28G Misc, , Disp: , Rfl:   •  levothyroxine (SYNTHROID) 25 MCG Tab, TAKE 1 TAB BY MOUTH EVERY MORNING ON A EMPTY STOMACH (Patient not taking: Reported on 1/20/2021), Disp: 90 Tab, Rfl: 3  •  vitamin D, Ergocalciferol, (DRISDOL) 42629 units Cap capsule, Take 1 Cap by mouth every 28 days. (Patient not taking: Reported on 1/20/2021), Disp: 3 Cap, Rfl: 3    Labs: Reviewed    Physical Examination:  Vital signs: /69 (BP Location: Left arm, Patient Position: Sitting, BP Cuff Size: Adult)   Pulse 92  There is no height or weight on file to calculate BMI.    Assessment and Plan:    1. DM2  · Since last appt, pt's A1c has increased significantly from 6% (10/2020) --> 6.6% (1/2021). Suspect this is 2/2 holiday foods and disrupted schedule w/ travel. She has since returned to her normal diet and is avoiding the pastries and eating earlier in the day.  · All of the SMBG readings that the pt  reported today were completely w/in goal   · Pt instructed to schedule eye exam prior to f/u    - Medication changes  · Pt to continue current regimen.   · Will adjust medications pending labs and SMBG readings at f/u if needed    - Lifestyle changes:  · Diet: Work on decreasing CHO, use alternatives (almond milk, cauliflower rice, keto bread, more veggies). Continue to avoid pastries  · Exercise: Pt to continue current regimen w/ the goal of 150 mins/week. She will plan to walk her dogs more pending the weather.    Labs prior to f/u: CMP, lipid panel, urine microalbumin, vitamin D    FU: 4 weeks to assess labs, BG readings, and potentially increase metformin/Jardiance    Adolfo Reed, PharmD  01/20/21    CC:   Thomas Garcia M.D.  Apple Diego M.D.  Allison Walhs, SandyD

## 2021-02-08 ENCOUNTER — NURSE TRIAGE (OUTPATIENT)
Dept: HEALTH INFORMATION MANAGEMENT | Facility: OTHER | Age: 61
End: 2021-02-08

## 2021-02-08 ENCOUNTER — OFFICE VISIT (OUTPATIENT)
Dept: URGENT CARE | Facility: CLINIC | Age: 61
End: 2021-02-08
Payer: MEDICARE

## 2021-02-08 VITALS
OXYGEN SATURATION: 98 % | BODY MASS INDEX: 27.56 KG/M2 | HEART RATE: 84 BPM | DIASTOLIC BLOOD PRESSURE: 84 MMHG | RESPIRATION RATE: 16 BRPM | TEMPERATURE: 97 F | WEIGHT: 146 LBS | SYSTOLIC BLOOD PRESSURE: 122 MMHG | HEIGHT: 61 IN

## 2021-02-08 DIAGNOSIS — R42 DIZZINESS: ICD-10-CM

## 2021-02-08 DIAGNOSIS — H81.393 PERIPHERAL VERTIGO OF BOTH EARS: ICD-10-CM

## 2021-02-08 LAB
EKG 4674: NORMAL
GLUCOSE BLD-MCNC: 129 MG/DL (ref 70–100)

## 2021-02-08 PROCEDURE — 99214 OFFICE O/P EST MOD 30 MIN: CPT | Performed by: PHYSICIAN ASSISTANT

## 2021-02-08 PROCEDURE — 82962 GLUCOSE BLOOD TEST: CPT | Performed by: PHYSICIAN ASSISTANT

## 2021-02-08 PROCEDURE — 93000 ELECTROCARDIOGRAM COMPLETE: CPT | Performed by: PHYSICIAN ASSISTANT

## 2021-02-08 RX ORDER — MECLIZINE HYDROCHLORIDE 25 MG/1
25 TABLET ORAL 3 TIMES DAILY PRN
Qty: 30 TAB | Refills: 0 | Status: SHIPPED | OUTPATIENT
Start: 2021-02-08 | End: 2021-07-01

## 2021-02-08 RX ORDER — ONDANSETRON 4 MG/1
4 TABLET, FILM COATED ORAL EVERY 6 HOURS PRN
Qty: 20 TAB | Refills: 1 | Status: SHIPPED | OUTPATIENT
Start: 2021-02-08 | End: 2021-02-25

## 2021-02-08 ASSESSMENT — FIBROSIS 4 INDEX: FIB4 SCORE: 0.9

## 2021-02-08 ASSESSMENT — ENCOUNTER SYMPTOMS
NAUSEA: 0
DIARRHEA: 0
SPEECH CHANGE: 0
PALPITATIONS: 0
SPUTUM PRODUCTION: 0
LOSS OF CONSCIOUSNESS: 0
DIZZINESS: 1
COUGH: 0
WEAKNESS: 0
TREMORS: 0
SHORTNESS OF BREATH: 0
FOCAL WEAKNESS: 0
TINGLING: 0
HEADACHES: 0
SEIZURES: 0
VOMITING: 0
CHILLS: 0
ABDOMINAL PAIN: 0
FEVER: 0
SENSORY CHANGE: 0
WHEEZING: 0

## 2021-02-08 NOTE — TELEPHONE ENCOUNTER
Dizziness with nausea and vomiting that started Saturday.    Pt is Diabetic with FBS yesterday 116 and today 100  1. Caller Name: Shahida Price                   Call Back Number: [phone] (home)     [facility] PCP or Specialty Provider: Yes Thomas Garcia M.D.          2.  Has the patient previously tested positive for COVID-19? No    3.  In the last two weeks, has the patient had any new or worsening symptoms (not explained by alternative diagnosis)? Yes, the patient reports the following COVID-19 consistent symptoms: nausea, vomiting and dizziness.    4.  Does patient have any comoribidities? DM    5.  Has the patient had any known contact with someone who is suspected or confirmed to have COVID-19? No.    5. Disposition: Advised to go to UC Patient declined VV with PCP  She wants to be seen    Note routed to [facility] Provider: FYI only.     Pt appt scheduled for UC today.        Reason for Disposition  • Lightheadedness (dizziness) present now, after 2 hours of rest and fluids    Additional Information  • Negative: Shock suspected (e.g., cold/pale/clammy skin, too weak to stand, low BP, rapid pulse)  • Negative: Difficult to awaken or acting confused (e.g., disoriented, slurred speech)  • Negative: Fainted, and still feels dizzy afterwards  • Negative: SEVERE difficulty breathing (e.g., struggling for each breath, speaks in single words)  • Negative: Overdose (accidental or intentional) of medications  • Negative: New neurologic deficit that is present now: * Weakness of the face, arm, or leg on one side of the body * Numbness of the face, arm, or leg on one side of the body * Loss of speech or garbled speech  • Negative: Heart beating < 50 beats per minute OR > 140 beats per minute  • Negative: Sounds like a life-threatening emergency to the triager  • Negative: Chest pain  • Negative: Rectal bleeding, bloody stool, or tarry-black stool  • Negative: Vomiting is the main symptom  • Negative: Diarrhea is the  "main symptom  • Negative: Headache is the main symptom  • Negative: Heat exhaustion suspected (i.e., dehydration from heat exposure)  • Negative: Patient states that he/she is having an anxiety/panic attack  • Negative: SEVERE dizziness (e.g., unable to stand, requires support to walk, feels like passing out now)  • Negative: SEVERE headache or neck pain  • Negative: Spinning or tilting sensation (vertigo) present now and one or more stroke risk factors (i.e., hypertension, diabetes, prior stroke/TIA, heart attack, age over 60) (Exception: prior physician evaluation for this AND no different/worse than usual)  • Negative: Loss of vision or double vision  • Negative: Extra heart beats OR irregular heart beating (i.e., 'palpitations')  • Negative: Difficulty breathing  • Negative: Drinking very little and has signs of dehydration (e.g., no urine > 12 hours, very dry mouth, very lightheaded)  • Negative: Follows bleeding (e.g., stomach, rectum, vagina) (Exception: became dizzy from sight of small amount blood)  • Negative: Patient sounds very sick or weak to the triager  • Negative: Spinning or tilting sensation (vertigo) present now  • Negative: Fever > 103 F (39.4 C)  • Negative: Fever > 100.0 F (37.8 C) and has diabetes mellitus or a weak immune system (e.g., HIV positive, cancer chemotherapy, organ transplant, splenectomy, chronic steroids)    Answer Assessment - Initial Assessment Questions  1. DESCRIPTION: \"Describe your dizziness.\"      All of a sudden even when sitting.  She drinks water and dizziness goes away for a little bit  2. LIGHTHEADED: \"Do you feel lightheaded?\" (e.g., somewhat faint, woozy, weak upon standing)    No  3. VERTIGO: \"Do you feel like either you or the room is spinning or tilting?\" (i.e. vertigo)   No  4. SEVERITY: \"How bad is it?\"  \"Do you feel like you are going to faint?\" \"Can you stand and walk?\"    - MILD - walking normally    - MODERATE - interferes with normal activities (e.g., " "work, school)     - SEVERE - unable to stand, requires support to walk, feels like passing out now.   moderated  5. ONSET:  \"When did the dizziness begin?\"      saturday  6. AGGRAVATING FACTORS: \"Does anything make it worse?\" (e.g., standing, change in head position)      no  7. HEART RATE: \"Can you tell me your heart rate?\" \"How many beats in 15 seconds?\"  (Note: not all patients can do this)        no  8. CAUSE: \"What do you think is causing the dizziness?\"      no  9. RECURRENT SYMPTOM: \"Have you had dizziness before?\" If so, ask: \"When was the last time?\" \"What happened that time?\"      no  10. OTHER SYMPTOMS: \"Do you have any other symptoms?\" (e.g., fever, chest pain, vomiting, diarrhea, bleeding)        no  11. PREGNANCY: \"Is there any chance you are pregnant?\" \"When was your last menstrual period?\"        no    Protocols used: DIZZINESS-A-OH      "

## 2021-02-08 NOTE — TELEPHONE ENCOUNTER
Regarding: dizziness, vomiting, nausea   ----- Message from Ritu Campbell sent at 2/8/2021 12:44 PM PST -----  Patient has dizziness that started on Saturday. Patient states she also has nausea and vomited yesterday.

## 2021-02-09 NOTE — PROGRESS NOTES
Subjective:   Shahida Price  is a 60 y.o. female who presents for Dizziness (X 3 days, dizziness, nausea and vomiting yesterday. )    Translation software utilized in care of patient.    Dizziness  Pertinent negatives include no abdominal pain, chest pain, chills, coughing, fever, headaches, nausea, rash, vomiting ( resolved, yesterday) or weakness.   Patient complaint complains last 2 days of dizziness with 2 episodes of nausea with vomiting while dizziness is occurring.  She reports 2 days ago as well as yesterday, as she attempted to get up from a lying down position she had a traumatic episode of dizziness.  She notes 2 episodes of recent tinnitus and muffled sounds to bilateral ears waxing waning over the last 1 to 2 weeks.  She denies chest pain or palpitations associated.  She denies numbness tingling or weakness.  She notes episodes of dizziness for seconds in duration and 2 of them caused her nausea to the point of a single episode of vomiting.  She notes immediate resolution of nausea has dizziness resolved seconds after rising from a lying down position.  Patient denies treatments tried.  She denies feeling poorly outside of episodes described above.  She denies history of vertigo.  She notes mild sinus congestion and fullness to ears recently.  She denies any abnormalities of urine denying any dysuria frequency urgency or hematuria.  She denies abdominal pain diarrhea or rash.  She denies any current nausea.    Review of Systems   Constitutional: Negative for chills and fever.   HENT: Positive for tinnitus. Negative for ear discharge, ear pain and hearing loss ( muffled, resolved).    Respiratory: Negative for cough, sputum production, shortness of breath and wheezing.    Cardiovascular: Negative for chest pain, palpitations and leg swelling.   Gastrointestinal: Negative for abdominal pain, diarrhea, nausea and vomiting ( resolved, yesterday).   Genitourinary: Negative.    Skin: Negative for rash.  "  Neurological: Positive for dizziness. Negative for tingling, tremors, sensory change, speech change, focal weakness, seizures, loss of consciousness, weakness and headaches.       Allergies   Allergen Reactions   • Shellfish Allergy Hives and Rash     Shellfish        Objective:   /84 (BP Location: Left arm, Patient Position: Sitting, BP Cuff Size: Adult)   Pulse 84   Temp 36.1 °C (97 °F) (Temporal)   Resp 16   Ht 1.549 m (5' 1\")   Wt 66.2 kg (146 lb)   SpO2 98%   BMI 27.59 kg/m²     Physical Exam  Vitals signs and nursing note reviewed.   Constitutional:       General: She is not in acute distress.     Appearance: She is well-developed. She is not diaphoretic.   HENT:      Head: Normocephalic and atraumatic.      Right Ear: Ear canal and external ear normal. Tympanic membrane is bulging. Tympanic membrane is not erythematous.      Left Ear: Ear canal and external ear normal. Tympanic membrane is bulging. Tympanic membrane is not erythematous.      Nose: Nose normal.   Eyes:      General: Lids are normal. No scleral icterus.        Right eye: No discharge.         Left eye: No discharge.      Extraocular Movements: Extraocular movements intact.      Right eye: Normal extraocular motion and no nystagmus.      Left eye: Normal extraocular motion and no nystagmus.      Conjunctiva/sclera: Conjunctivae normal.      Pupils: Pupils are equal, round, and reactive to light.   Neck:      Musculoskeletal: Neck supple.   Cardiovascular:      Rate and Rhythm: Normal rate and regular rhythm.  No extrasystoles are present.  Pulmonary:      Effort: Pulmonary effort is normal. No respiratory distress.   Musculoskeletal: Normal range of motion.   Skin:     General: Skin is warm and dry.      Coloration: Skin is not pale.      Findings: No erythema.   Neurological:      Mental Status: She is alert and oriented to person, place, and time. She is not disoriented.      Cranial Nerves: No cranial nerve deficit.      " Sensory: Sensation is intact. No sensory deficit.      Motor: Motor function is intact.      Coordination: Coordination is intact. Coordination normal.      Gait: Gait is intact.      Comments: +Leonides Hallpike; left greater than right   Psychiatric:         Speech: Speech normal.         Behavior: Behavior normal.       POCT Glucose - 129    ECG in the office reveals a normal sinus rhythm with a rate of 79. There is no ectopy, no ST elevation, depression, no signs of ischemia or infarct.  Very minimal change from study of April 2018.      Assessment/Plan:   1. Peripheral vertigo of both ears    2. Dizziness  - EKG  - POCT Glucose    Other orders  - Nutritional Supplements (VITAMIN D BOOSTER PO); Take  by mouth.    Upon returning to exam room to give patient AVS and discussed attempts at treating her peripheral vertigo conclude visit when patient expresses through the translating software that she has indeed felt mild intermittent chest pain over the last 2 days.    Patient has been directed to [facility] ER for further management/work up now, today.    My total time spent caring for the patient on the day of the encounter was 30 minutes.     This does not include time spent on separately billable procedures/tests.      I have worn an N95 mask, gloves and eye protection for the entire encounter with this patient.     Differential diagnosis, natural history, supportive care, and indications for immediate follow-up discussed.

## 2021-02-09 NOTE — PATIENT INSTRUCTIONS
Vértigo  Vertigo  El vértigo es la sensación de que usted o las cosas que lo rodean se mueven cuando en realidad eso no sucede. Esta sensación puede aparecer y desaparecer en cualquier momento. El vértigo suele desaparecer solo. Esta afección puede ser peligrosa si ocurre cuando está realizando actividades juan conducir o trabajar con máquinas.  El médico le hará pruebas para encontrar la causa del vértigo. Estas pruebas también ayudarán al médico a decidir cuál es el mejor tratamiento para usted.  Siga estas indicaciones en christie casa:  Comida y bebida         · Cass suficiente líquido para mantener el pis (la orina) de color amarillo pálido.  · No cass alcohol.  Actividad  · Retome laura actividades habituales juan se lo haya indicado el médico. Pregúntele al médico qué actividades son seguras para usted.  · Por la mañana, siéntese kay a un lado de la cama. Cuando se sienta samara, póngase lentamente de pie mientras se sostiene de algo, hasta que sepa que ha logrado el equilibrio.  · Muévase lentamente. Evite determinadas posiciones o hacer movimientos repentinos del cuerpo o de la carl, juan se lo haya indicado el médico.  · Use un bastón si tiene dificultad para ponerse de pie o caminar.  · Si se siente mareado, siéntese de inmediato.  · Evite realizar tareas o actividades que puedan causarle peligro a usted o a otras personas si se marea.  · Evite agacharse si se siente mareado. En christie casa, coloque los objetos de modo que le resulte fácil alcanzarlos sin agacharse.  · No conduzca vehículos ni opere maquinaria pesada si se siente mareado.  Indicaciones generales  · Dike los medicamentos de venta antonia y los recetados solamente juan se lo haya indicado el médico.  · Concurra a todas las visitas de control juan se lo haya indicado el médico. [place] es importante.  Comuníquese con un médico si:  · Los medicamentos no le alivian el vértigo.  · Tiene fiebre.  · Los problemas empeoran o le aparecen síntomas  nuevos.  · Estee familiares o amigos observan cambios en christie comportamiento.  · La sensación de malestar estomacal empeora.  · Los vómitos empeoran.  · Pierde la sensibilidad (tiene entumecimiento) en alguna parte del cuerpo.  · Siente pinchazos u hormigueo en alguna parte del cuerpo.  Solicite ayuda inmediatamente si:  · Tiene dificultad para moverse o para caminar.  · Esta mareado todo el tiempo.  · Pierde el conocimiento (se desmaya).  · Tiene mich de carl muy intensos.  · Siente debilidad en las chetan, los brazos o las piernas.  · Tiene cambios en la audición.  · Tiene cambios en la forma de darby (visión).  · Tiene rigidez en el wicho.  · La yoandy brillante empieza a molestarlo.  Resumen  · El vértigo es la sensación de que usted o las cosas que lo rodean se mueven cuando en realidad eso no sucede.  · El médico le hará pruebas para encontrar la causa del vértigo.  · João vez le indiquen que evite algunas tareas, posiciones o movimientos.  · Comuníquese con un médico si los medicamentos no lo ayudan o si tiene fiebre, síntomas nuevos o un cambio en el comportamiento.  · Pida ayuda de inmediato si tiene mich de carl muy intensos o si tiene cambios en la manera de hablar, oír o darby.  Esta información no tiene juan fin reemplazar el consejo del médico. Asegúrese de hacerle al médico cualquier pregunta que tenga.  Document Released: 01/20/2012 Document Revised: 01/06/2020 Document Reviewed: 01/06/2020  Elsevier Patient Education © 2020 Elsevier Inc.    Cómo realizar la maniobra de Epley  How to Perform the Epley Maneuver  La maniobra de Epley es un ejercicio que maribel los síntomas del vértigo. El vértigo es la sensación de que usted o todo lo que lo rodea se mueven cuando en realidad eso no sucede. Cuando eleuterio persona siente vértigo, puede tener la sensación de que la habitación da vueltas y dificultad para caminar. Los mareos son un poco diferentes al vértigo. Cuando eleuterio persona está mareada, puede tener  inestabilidad o sensación de desvanecimiento.  Puede realizar esta maniobra en christie casa cuando tenga los síntomas de vértigo. Puede realizarla 3 veces por día juan stephanie hasta que desaparezcan los síntomas.  Aunque la maniobra de Epley lo alivie del vértigo servando algunas semanas, es posible que los síntomas vuelvan. Esta maniobra maribel los síntomas del vértigo, nael no los mareos.  ¿Cuáles son los riesgos?  Si se realiza de forma correcta, la maniobra de Epley se considera un procedimiento seguro. En ocasiones, puede provocar mareos o náuseas que desaparecen después de un breve período. Si tiene otros síntomas, juan cambios en la visión, debilidad o entumecimiento, deje de realizar la maniobra y llame al médico.  Cómo realizar la maniobra de Epley  1. Siéntese en el borde de eleuterio cama o eleuterio dumas con la espalda recta y las piernas extendidas o colgando sobre el borde de la cama o la dumas.  2. Gire la carl a medias hacia el oído o el lado afectado.  3. Recuéstese hacia atrás con la carl girada hasta que se encuentre recostado sobre la espalda. Es conveniente colocar eleuterio almohada debajo de los hombros.  4. Mantenga esta posición servando 30 segundos. Es posible que tenga eleuterio crisis de vértigo. [place] es normal.  5. Gire la carl en dirección opuesta hasta que el oído no afectado esté orientado al suelo.  6. Mantenga esta posición servando 30 segundos. Es posible que tenga eleuterio crisis de vértigo. [place] es normal. Mantenga esta posición hasta que el vértigo desaparezca.  7. Gire todo el cuerpo hacia el mismo lado que la carl. Mantenga esta posición servando otros 30 segundos.  8. Vuelva a sentarse.  Puede repetir dulce ejercicio hasta 3 veces por día.  Siga estas indicaciones en christie casa:  · Puede retomar laura actividades normales después de realizar la maniobra de Epley.  · Pregúntele al médico si debe hacer algo en christie casa para evitar el vértigo. El médico puede recomendarle lo siguiente:  ? Mantener la carl levantada  (elevada) con dos o más almohadas mientras duerme.  ? No dormir sobre el lado del oído afectado.  ? Levantarse lentamente de la cama.  ? Evitar los movimientos repentinos servando el día.  ? Evitar los movimientos de carl intensos, juan mirar hacia arriba o agacharse.  Comuníquese con un médico si:  · El vértigo empeora.  · Tiene otros síntomas, juan los siguientes:  ? Náuseas.  ? Vómitos.  ? Dolor de carl.  Solicite ayuda de inmediato si:  · Nota cambios en la visión.  · Sufre un dolor de carl o en el wicho intenso o que empeora.  · No puede dejar de vomitar.  · Siente entumecimiento o debilidad nuevos en alguna parte del cuerpo.  Resumen  · El vértigo es la sensación de que usted o todo lo que lo rodea se mueven cuando en realidad eso no sucede.  · La maniobra de Epley es un ejercicio que maribel los síntomas del vértigo.  · Si se realiza de forma correcta, la maniobra de Epley se considera un procedimiento seguro. Puede realizarla 3 veces por día juan stephanie.  Esta información no tiene juan fin reemplazar el consejo del médico. Asegúrese de hacerle al médico cualquier pregunta que tenga.  Document Released: 12/23/2014 Document Revised: 08/14/2018 Document Reviewed: 08/14/2018  Elsevier Patient Education © 2020 Elsevier Inc.

## 2021-02-17 ENCOUNTER — NURSE TRIAGE (OUTPATIENT)
Dept: HEALTH INFORMATION MANAGEMENT | Facility: OTHER | Age: 61
End: 2021-02-17

## 2021-02-17 NOTE — TELEPHONE ENCOUNTER
[id number] DORIAN FOREMAN    WAS SEEN FOR SAME ON 2/8/21...  SENT HOME W/ DX VERTIGO AND RX FOR MECLIZINE AND ZOFRAN. THE MEDS MAKE HER TIRED SO SHE IS ONLY TAKING IT AT NIGHT. THEREFORE SHE SUFFERS DURING THE DAY FROM THE DIZZINESS.       1. Caller Name: TONY MOSER                 Call Back Number: [phone]  [facility] PCP or Specialty Provider: Yes JASON COTE        2.  Has the patient previously tested positive for COVID-19? No    3.  In the last two weeks, has the patient had any new or worsening symptoms (not explained by alternative diagnosis)? No.    4.  Does patient have any comoribidities? DM    5.  Has the patient had any known contact with someone who is suspected or confirmed to have COVID-19? No.    5. Disposition: Cleared by RN Triage as potential is low for COVID-19; OK to keep/schedule appointment    Note routed to [facility] Provider: CHANDRIKA only.                 Reason for Disposition  • [1] MODERATE dizziness (e.g., vertigo; feels very unsteady, interferes with normal activities) AND [2] has been evaluated by physician for this    Additional Information  • Negative: [1] Weakness (i.e., paralysis, loss of muscle strength) of the face, arm or leg on one side of the body AND [2] sudden onset AND [3] present now  • Negative: [1] Numbness (i.e., loss of sensation) of the face, arm or leg on one side of the body AND [2] sudden onset AND [3] present now  • Negative: [1] Loss of speech or garbled speech AND [2] sudden onset AND [3] present now  • Negative: Difficult to awaken or acting confused (e.g., disoriented, slurred speech)  • Negative: Sounds like a life-threatening emergency to the triager  • Negative: Followed a head injury  • Negative: Followed an ear injury  • Negative: Localized weakness or numbness is main symptom  • Negative: Dizziness relates to riding in a car, going to an amusement park, etc.  • Negative: [1] Dizziness is main symptom AND [2] NO spinning sensation (i.e.,  "vertigo)  • Negative: SEVERE dizziness (vertigo) (e.g., unable to walk without assistance)  • Negative: [1] Dizziness (vertigo) present now AND [2] one or more stroke risk factors (i.e., hypertension, diabetes, prior stroke/TIA/heart attack)  (Exception: prior physician evaluation for this AND no different/worse than usual)  • Negative: [1] Dizziness (vertigo) present now AND [2] age > 60  (Exception: prior physician evaluation for this AND no different/worse than usual)  • Negative: Severe headache (e.g., excruciating)  (Exception: similar to previous migraines)  • Negative: Patient sounds very sick or weak to the triager  • Negative: [1] MODERATE dizziness (e.g., vertigo; feels very unsteady, interferes with normal activities) AND [2] has NOT been evaluated by physician for this  • Negative: Earache  • Negative: Vomiting occurs with dizziness    Answer Assessment - Initial Assessment Questions  1. DESCRIPTION: \"Describe your dizziness.\"      [address]   DX. VERTIGO AT  2/8/21  2. VERTIGO: \"Do you feel like either you or the room is spinning or tilting?\"         3. LIGHTHEADED: \"Do you feel lightheaded?\" (e.g., somewhat faint, woozy, weak upon standing)        4. SEVERITY: \"How bad is it?\"  \"Can you walk?\"    - MILD - Feels unsteady but walking normally.    - MODERATE - Feels very unsteady when walking, but not falling; interferes with normal activities (e.g., school, work) .    - SEVERE - Unable to walk without falling (requires assistance).      MILD TO MODERATE  5. ONSET:  \"When did the dizziness begin?\"      2/8/21  6. AGGRAVATING FACTORS: \"Does anything make it worse?\" (e.g., standing, change in head position)      CHANGING POSITIONS  7. CAUSE: \"What do you think is causing the dizziness?\"      UNSURE  8. RECURRENT SYMPTOM: \"Have you had dizziness before?\" If so, ask: \"When was the last time?\" \"What happened that time?\"      YES  9. OTHER SYMPTOMS: \"Do you have any other symptoms?\" (e.g., headache, weakness, " "numbness, vomiting, earache)      NAUSEA  10. PREGNANCY: \"Is there any chance you are pregnant?\" \"When was your last menstrual period?\"        N/A    Protocols used: DIZZINESS - VERTIGO-A-AH      "

## 2021-02-17 NOTE — TELEPHONE ENCOUNTER
----- Message from Tess Lazaro sent at 2/17/2021  2:04 PM PST -----  Patient has an been feeling light headed and nauseous. Tested for covid back in nov

## 2021-02-25 ENCOUNTER — OFFICE VISIT (OUTPATIENT)
Dept: MEDICAL GROUP | Facility: PHYSICIAN GROUP | Age: 61
End: 2021-02-25
Payer: MEDICARE

## 2021-02-25 VITALS
WEIGHT: 143.8 LBS | HEIGHT: 61 IN | OXYGEN SATURATION: 96 % | BODY MASS INDEX: 27.15 KG/M2 | DIASTOLIC BLOOD PRESSURE: 60 MMHG | RESPIRATION RATE: 16 BRPM | SYSTOLIC BLOOD PRESSURE: 115 MMHG | TEMPERATURE: 96.8 F | HEART RATE: 102 BPM

## 2021-02-25 DIAGNOSIS — M25.552 HIP PAIN, ACUTE, LEFT: ICD-10-CM

## 2021-02-25 DIAGNOSIS — R42 VERTIGO: ICD-10-CM

## 2021-02-25 DIAGNOSIS — I20.89 STABLE ANGINA PECTORIS (HCC): ICD-10-CM

## 2021-02-25 DIAGNOSIS — Z23 NEED FOR VACCINATION: ICD-10-CM

## 2021-02-25 PROCEDURE — 99214 OFFICE O/P EST MOD 30 MIN: CPT | Mod: 25 | Performed by: FAMILY MEDICINE

## 2021-02-25 PROCEDURE — G0008 ADMIN INFLUENZA VIRUS VAC: HCPCS | Performed by: FAMILY MEDICINE

## 2021-02-25 PROCEDURE — 90686 IIV4 VACC NO PRSV 0.5 ML IM: CPT | Performed by: FAMILY MEDICINE

## 2021-02-25 ASSESSMENT — FIBROSIS 4 INDEX: FIB4 SCORE: 0.9

## 2021-02-25 ASSESSMENT — PATIENT HEALTH QUESTIONNAIRE - PHQ9: CLINICAL INTERPRETATION OF PHQ2 SCORE: 0

## 2021-02-25 NOTE — ASSESSMENT & PLAN NOTE
This is a new problem.  Has been complaining of vertigo since February 2021.  Had to go to urgent care.  EKG while in the office was negative for any acute changes.  Was given Zofran and meclizine.  Mentions that she continues to experience persistent vertigo. Meclizine is helping.     Of note, the patient also endorsed chest pain and thus was directed to the emergency room after ED visit or urgent care.  However, she did not go because this is a chronic issue being followed up by cardiology.

## 2021-02-25 NOTE — ASSESSMENT & PLAN NOTE
This is a new problem  Has been c/o left hip pain for the past month   Wakes her up from sleep  It is localized to the left trochanteric region   Has hx of steroid injection since 2013

## 2021-02-25 NOTE — ASSESSMENT & PLAN NOTE
This is a chronic condition.  Stable angina.  Follows up with cardiology.  Has undergone stress testing x2 in the past which has been negative for CAD.  Has been given a trial of Imdur.  Did not tolerate. Went back to [facility]To follow-up with cardiology in April 2021.

## 2021-02-25 NOTE — PROGRESS NOTES
Subjective:     Chief Complaint   Patient presents with   • Vertigo     since ,    • Leg Pain     and hip pain, x 1 month        HPI:   Shahida presents today to discuss the following.    Vertigo  This is a new problem.  Has been complaining of vertigo since 2021.  Had to go to urgent care.  EKG while in the office was negative for any acute changes.  Was given Zofran and meclizine.  Mentions that she continues to experience persistent vertigo. Meclizine is helping.     Of note, the patient also endorsed chest pain and thus was directed to the emergency room after ED visit or urgent care.  However, she did not go because this is a chronic issue being followed up by cardiology.     Chest pain  This is a chronic condition.  Stable angina.  Follows up with cardiology.  Has undergone stress testing x2 in the past which has been negative for CAD.  Has been given a trial of Imdur.  Did not tolerate. Went back to [place].To follow-up with cardiology in 2021.    Hip pain, acute, left  This is a new problem  Has been c/o left hip pain for the past month   Wakes her up from sleep  It is localized to the left trochanteric region   Has hx of steroid injection since       Past Medical History:   Diagnosis Date   • Allergy    • Atopic dermatitis    • Depression    • Diabetes (HCC)    • Gastritis    • Hyperlipidemia    • Hypertension        Social History     Tobacco Use   • Smoking status: Former Smoker     Packs/day: 1.00     Years: 15.00     Pack years: 15.00     Quit date:      Years since quittin.1   • Smokeless tobacco: Never Used   Substance Use Topics   • Alcohol use: Not Currently   • Drug use: Never       Current Outpatient Medications Ordered in Epic   Medication Sig Dispense Refill   • Nutritional Supplements (VITAMIN D BOOSTER PO) Take  by mouth.     • meclizine (ANTIVERT) 25 MG Tab Take 1 Tab by mouth 3 times a day as needed. 30 Tab 0   • ranolazine (RANEXA) 500 MG TABLET SR 12 HR Take  500 mg by mouth 2 times a day.     • fluticasone (FLONASE) 50 MCG/ACT nasal spray Administer 2 Sprays into affected nostril(S) every day. 16 g 11   • omeprazole (PRILOSEC) 40 MG delayed-release capsule Take 1 Cap by mouth every day. 90 Cap 3   • albuterol 108 (90 Base) MCG/ACT Aero Soln inhalation aerosol Inhale 2 Puffs every 6 hours as needed for Shortness of Breath. 8.5 g 6   • ammonium lactate (LAC-HYDRIN) 12 % Lotion Apply thin layer to affected area (arms) twice daily as needed for 14 days 567 g 3   • Ascorbic Acid (VITAMIN C PO) Take  by mouth.     • meloxicam (MOBIC) 15 MG tablet Take 15 mg by mouth every day.     • JARDIANCE 10 MG Tab TAKE 1 TAB BY MOUTH EVERY DAY. 90 Tab 0   • atorvastatin (LIPITOR) 20 MG Tab TOME MEGGAN TABLETA TODOS LOS DRAPER 90 Tab 3   • levothyroxine (SYNTHROID) 25 MCG Tab TAKE 1 TAB BY MOUTH EVERY MORNING ON A EMPTY STOMACH 90 Tab 3   • metFORMIN (GLUCOPHAGE) 500 MG Tab TOME MEGGAN TABLETA DOS VECES AL ROLAND CON LAS COMIDAS 180 Tab 3   • lisinopril (PRINIVIL) 10 MG Tab TOME MEGGAN TABLETA TODOS LOS DRAPER 90 Tab 3   • hydrOXYzine HCl (ATARAX) 25 MG Tab Take 1 Tab by mouth 3 times a day as needed for Itching. 30 Tab 0   • glucose blood (TRUE METRIX BLOOD GLUCOSE TEST) strip Test blood sugars once daily 50 Strip 11   • vitamin D, Ergocalciferol, (DRISDOL) 98388 units Cap capsule Take 1 Cap by mouth every 28 days. 3 Cap 3   • cyclobenzaprine (FLEXERIL) 10 MG Tab Take 1 Tab by mouth 3 times a day as needed. 30 Tab 3   • aspirin (ASA) 81 MG Chew Tab chewable tablet Take 1 Tab by mouth every day. 100 Tab 3   • Blood Glucose Monitoring Suppl (TRUE METRIX METER) w/Device Kit USE ONCE DAILY TO TEST BLOOD SUGAR 1 Kit 0   • TRUEPLUS SAFETY LANCETS 28G Misc        No current Epic-ordered facility-administered medications on file.       Allergies:  Shellfish allergy    Health Maintenance: Completed    ROS:  Gen: no fevers/chills, no changes in weight  Eyes: no changes in vision  ENT: no sore throat, no hearing  "loss, no bloody nose  Pulm: no sob, no cough        Objective:     Exam:  /60 (BP Location: Left arm, Patient Position: Sitting, BP Cuff Size: Adult)   Pulse (!) 102   Temp 36 °C (96.8 °F) (Temporal)   Resp 16   Ht 1.549 m (5' 1\")   Wt 65.2 kg (143 lb 12.8 oz)   SpO2 96%   BMI 27.17 kg/m²  Body mass index is 27.17 kg/m².    Constitutional: Alert, no distress, well-groomed.  Skin: Warm, dry, good turgor, no rashes in visible areas.  Eye: Equal, round and reactive, conjunctiva clear, lids normal.  ENMT: Lips without lesions, good dentition, moist mucous membranes.  Neck: Trachea midline, no masses, no thyromegaly.  Respiratory: Unlabored respiratory effort, no cough.  MSK: Normal gait, moves all extremities.  Logroll test of the left hip is negative.  FADIR/ASPEN test elicits mild left hip pain.  Neuro: Grossly non-focal.   Psych: Alert and oriented x3, normal affect and mood.      Assessment & Plan:     60 y.o. female with the following -     1. Need for vaccination  - Influenza Vaccine Quad Injection (PF)    2. Vertigo  New problem.  Likely due to BPPV.  Responding well to meclizine.  Amenable to physical therapy for vestibular rehab.  - REFERRAL TO PHYSICAL THERAPY    3. Stable angina pectoris (HCC)  Chronic issue.  Stable.  I strongly reinforced emergency room precautions should her chest pain get worse.  At this time she says that it is stable.  She will continue to follow-up with cardiology.    4. Hip pain, acute, left  New problem.  She has been experiencing left hip pain consistent with trochanter bursitis.  Discussed injection with sports medicine today.  However, she would like to hold off on that.  Would like to pursue home stretching exercises and rice therapy.  X-rays will be obtained as well.  - DX-HIP-BILATERAL-WITH PELVIS-3/4 VIEWS; Future      Return in about 3 months (around 5/25/2021).    Please note that this dictation was created using voice recognition software. I have made every " reasonable attempt to correct obvious errors, but I expect that there are errors of grammar and possibly content that I did not discover before finalizing the note.

## 2021-03-04 ENCOUNTER — HOSPITAL ENCOUNTER (OUTPATIENT)
Dept: LAB | Facility: MEDICAL CENTER | Age: 61
End: 2021-03-04
Attending: FAMILY MEDICINE
Payer: MEDICARE

## 2021-03-04 DIAGNOSIS — E11.9 TYPE 2 DIABETES MELLITUS WITHOUT COMPLICATION, WITHOUT LONG-TERM CURRENT USE OF INSULIN (HCC): ICD-10-CM

## 2021-03-04 LAB
ALBUMIN SERPL BCP-MCNC: 4.3 G/DL (ref 3.2–4.9)
ALBUMIN/GLOB SERPL: 1.4 G/DL
ALP SERPL-CCNC: 96 U/L (ref 30–99)
ALT SERPL-CCNC: 26 U/L (ref 2–50)
ANION GAP SERPL CALC-SCNC: 9 MMOL/L (ref 7–16)
AST SERPL-CCNC: 21 U/L (ref 12–45)
BILIRUB SERPL-MCNC: 0.3 MG/DL (ref 0.1–1.5)
BUN SERPL-MCNC: 14 MG/DL (ref 8–22)
CALCIUM SERPL-MCNC: 9.4 MG/DL (ref 8.5–10.5)
CHLORIDE SERPL-SCNC: 102 MMOL/L (ref 96–112)
CHOLEST SERPL-MCNC: 131 MG/DL (ref 100–199)
CO2 SERPL-SCNC: 25 MMOL/L (ref 20–33)
CREAT SERPL-MCNC: 0.67 MG/DL (ref 0.5–1.4)
ERYTHROCYTE [DISTWIDTH] IN BLOOD BY AUTOMATED COUNT: 46.6 FL (ref 35.9–50)
GLOBULIN SER CALC-MCNC: 3.1 G/DL (ref 1.9–3.5)
GLUCOSE SERPL-MCNC: 108 MG/DL (ref 65–99)
HCT VFR BLD AUTO: 44.6 % (ref 37–47)
HDLC SERPL-MCNC: 47 MG/DL
HGB BLD-MCNC: 14.3 G/DL (ref 12–16)
LDLC SERPL CALC-MCNC: 63 MG/DL
MCH RBC QN AUTO: 29.1 PG (ref 27–33)
MCHC RBC AUTO-ENTMCNC: 32.1 G/DL (ref 33.6–35)
MCV RBC AUTO: 90.8 FL (ref 81.4–97.8)
PLATELET # BLD AUTO: 231 K/UL (ref 164–446)
PMV BLD AUTO: 10.9 FL (ref 9–12.9)
POTASSIUM SERPL-SCNC: 4.4 MMOL/L (ref 3.6–5.5)
PROT SERPL-MCNC: 7.4 G/DL (ref 6–8.2)
RBC # BLD AUTO: 4.91 M/UL (ref 4.2–5.4)
SODIUM SERPL-SCNC: 136 MMOL/L (ref 135–145)
TRIGL SERPL-MCNC: 107 MG/DL (ref 0–149)
TSH SERPL DL<=0.005 MIU/L-ACNC: 2.81 UIU/ML (ref 0.38–5.33)
WBC # BLD AUTO: 6 K/UL (ref 4.8–10.8)

## 2021-03-04 PROCEDURE — 84443 ASSAY THYROID STIM HORMONE: CPT

## 2021-03-04 PROCEDURE — 80061 LIPID PANEL: CPT

## 2021-03-04 PROCEDURE — 80053 COMPREHEN METABOLIC PANEL: CPT

## 2021-03-04 PROCEDURE — 85027 COMPLETE CBC AUTOMATED: CPT

## 2021-03-04 PROCEDURE — 36415 COLL VENOUS BLD VENIPUNCTURE: CPT

## 2021-03-08 ENCOUNTER — APPOINTMENT (OUTPATIENT)
Dept: MEDICAL GROUP | Facility: PHYSICIAN GROUP | Age: 61
End: 2021-03-08
Payer: MEDICARE

## 2021-03-15 DIAGNOSIS — Z23 NEED FOR VACCINATION: ICD-10-CM

## 2021-03-19 ENCOUNTER — HOSPITAL ENCOUNTER (OUTPATIENT)
Dept: RADIOLOGY | Facility: MEDICAL CENTER | Age: 61
End: 2021-03-19
Attending: FAMILY MEDICINE
Payer: MEDICARE

## 2021-03-19 DIAGNOSIS — M25.552 HIP PAIN, ACUTE, LEFT: ICD-10-CM

## 2021-03-19 PROCEDURE — 73521 X-RAY EXAM HIPS BI 2 VIEWS: CPT

## 2021-03-23 ENCOUNTER — APPOINTMENT (OUTPATIENT)
Dept: PHYSICAL THERAPY | Facility: REHABILITATION | Age: 61
End: 2021-03-23
Attending: FAMILY MEDICINE
Payer: MEDICARE

## 2021-03-24 ENCOUNTER — IMMUNIZATION (OUTPATIENT)
Dept: FAMILY PLANNING/WOMEN'S HEALTH CLINIC | Facility: IMMUNIZATION CENTER | Age: 61
End: 2021-03-24
Attending: INTERNAL MEDICINE
Payer: MEDICARE

## 2021-03-24 DIAGNOSIS — Z23 NEED FOR VACCINATION: ICD-10-CM

## 2021-03-24 DIAGNOSIS — Z23 ENCOUNTER FOR VACCINATION: Primary | ICD-10-CM

## 2021-03-24 PROCEDURE — 0001A PFIZER SARS-COV-2 VACCINE: CPT

## 2021-03-24 PROCEDURE — 91300 PFIZER SARS-COV-2 VACCINE: CPT

## 2021-03-30 ENCOUNTER — APPOINTMENT (OUTPATIENT)
Dept: PHYSICAL THERAPY | Facility: REHABILITATION | Age: 61
End: 2021-03-30
Attending: FAMILY MEDICINE
Payer: MEDICARE

## 2021-04-12 ENCOUNTER — APPOINTMENT (OUTPATIENT)
Dept: PHYSICAL THERAPY | Facility: REHABILITATION | Age: 61
End: 2021-04-12
Attending: FAMILY MEDICINE
Payer: MEDICARE

## 2021-04-15 ENCOUNTER — IMMUNIZATION (OUTPATIENT)
Dept: FAMILY PLANNING/WOMEN'S HEALTH CLINIC | Facility: IMMUNIZATION CENTER | Age: 61
End: 2021-04-15
Attending: INTERNAL MEDICINE
Payer: MEDICARE

## 2021-04-15 DIAGNOSIS — Z23 ENCOUNTER FOR VACCINATION: Primary | ICD-10-CM

## 2021-04-15 PROCEDURE — 91300 PFIZER SARS-COV-2 VACCINE: CPT

## 2021-04-15 PROCEDURE — 0002A PFIZER SARS-COV-2 VACCINE: CPT

## 2021-04-26 NOTE — OP THERAPY DAILY TREATMENT
Outpatient Physical Therapy  DAILY TREATMENT     [facility] Outpatient Physical Therapy [place] [id number] [facility], [address]  [place] [address]  Phone:  [phone]    Date: 03/25/2020    Patient: Shahida Price  YOB: 1960  MRN: [id number]     Time Calculation  Start time: 1100  Stop time: 1130 Time Calculation (min): 30 minutes       Chief Complaint: Shoulder Problem    Visit #: 5    SUBJECTIVE:  The patient reports having more pain to the (R) side f the neck and (R) shoulder. She has difficulty sleeping at night due to her pain. She believes that her activities throughout the day are causing the pain to increase.    OBJECTIVE:  Current objective measures:     -decrease pain to (R) shoulder and increase mobility      Therapeutic Exercises (CPT 43262):     1. Supine dowel flexion, abduction and ER , 1 x10 reps each , pain in (R) shoulder     2. Hands to head ER, 1 x10 reps    3. Corner stretch, 3 x30 sec    5. Shoulder shrugs, 1 x10 reps     6. Neck retractions , 1 x10 reps     7. Shoulder extension, 1 x10 reps     8. Shoulder retractions, 1 x10 reps pink tb    Therapeutic Treatments and Modalities:     2. E Stim Unattended (CPT 33889), C/s and (R) shoulder , IFC and MHP for 15 minutes     Time-based treatments/modalities:  Therapeutic exercise minutes (CPT 91190): 30 minutes         ASSESSMENT:   Response to treatment:   Performed light strengthening exercises with thera-band; patient had pain in both shoulders with overhead activity and lifting objects between trunk and shoulder levels. (L) bicep pain lifting in front of body Patient would like to cancel her last appointments and discuss possible cortisone injection for her shoulder pain.      PLAN/RECOMMENDATIONS:   Plan for treatment: therapy treatment to continue next visit.  Planned interventions for next visit: continue with current treatment.        [No Acute Distress] : in no acute distress [Well developed] : well developed [Well Nourished] : ~L well nourished [Good Hygeine] : demonstrates good hygeine [Oriented x3] : oriented to person, place, and time [Normal Memory] : ~T memory was ~L unimpaired [Normal Mood/Affect] : mood and affect are normal [None] : no CVA tenderness [Soft, Nontender] : the abdomen was soft and nontender [Warm and Dry] : was warm and dry to touch [Normal Gait] : gait was normal [Vulvar Atrophy] : vulvar atrophy [Labia Majora] : were normal [Labia Minora] : were normal [Normal] : was normal [Atrophy] : atrophy [Dry Mucosa] : dry mucosa [Cystocele] : a cystocele [Uterine Prolapse] : uterine prolapse [No Bleeding] : there was no active vaginal bleeding [FreeTextEntry4] : pessary string hanging outside vaginal opening. Redness noted to vaginal opening.

## 2021-04-30 ENCOUNTER — TELEPHONE (OUTPATIENT)
Dept: CARDIOLOGY | Facility: MEDICAL CENTER | Age: 61
End: 2021-04-30

## 2021-04-30 NOTE — TELEPHONE ENCOUNTER
Spoke to patient confirming upcoming appointment with  on 05/12/21. Patient was grateful for reminder.

## 2021-05-12 ENCOUNTER — OFFICE VISIT (OUTPATIENT)
Dept: CARDIOLOGY | Facility: MEDICAL CENTER | Age: 61
End: 2021-05-12
Payer: MEDICARE

## 2021-05-12 VITALS
SYSTOLIC BLOOD PRESSURE: 124 MMHG | OXYGEN SATURATION: 97 % | BODY MASS INDEX: 27.38 KG/M2 | DIASTOLIC BLOOD PRESSURE: 76 MMHG | HEIGHT: 61 IN | HEART RATE: 94 BPM | RESPIRATION RATE: 12 BRPM | WEIGHT: 145 LBS

## 2021-05-12 DIAGNOSIS — I10 ESSENTIAL HYPERTENSION: ICD-10-CM

## 2021-05-12 DIAGNOSIS — E78.5 DYSLIPIDEMIA: ICD-10-CM

## 2021-05-12 DIAGNOSIS — I20.89 MICROVASCULAR ANGINA (HCC): ICD-10-CM

## 2021-05-12 PROCEDURE — 99214 OFFICE O/P EST MOD 30 MIN: CPT | Performed by: INTERNAL MEDICINE

## 2021-05-12 ASSESSMENT — ENCOUNTER SYMPTOMS
SLEEP DISTURBANCES DUE TO BREATHING: 0
VOMITING: 0
HEADACHES: 0
NUMBNESS: 0
IRREGULAR HEARTBEAT: 0
NAUSEA: 0
DECREASED APPETITE: 0
SHORTNESS OF BREATH: 0
SYNCOPE: 0
WHEEZING: 0
DIAPHORESIS: 0
DIZZINESS: 0
PARESTHESIAS: 0
ORTHOPNEA: 0
ABDOMINAL PAIN: 0
PALPITATIONS: 0
WEAKNESS: 0

## 2021-05-12 ASSESSMENT — FIBROSIS 4 INDEX: FIB4 SCORE: 1.05

## 2021-05-12 NOTE — PROGRESS NOTES
Cardiology Follow-up Consultation Note    Date of note:    5/12/2021    Primary Care Provider: Thomas Garcia M.D.    Name:             Shahida Price     YOB: 1960  MRN:               [id number]    Chief Complaint   Patient presents with   • Hypertension     F/V Dx: Essential hypertension   • Dyslipidemia   • Chest Pain     F/V Dx: Atypical chest pain       HISTORY OF PRESENT ILLNESS  Ms. Shahida Price is a Estonian-speaking, 60 y.o. female with history of chest pain 2/2 microvascular angina, diabetes, hypertension and dyslipidemia who returns to see us for follow-up for chest pain.      Last clinic visit: 1/27/2021    Interim History:  Patient was started on imdur during our last visit.  She took one pill and had significant headache with it and did not try it again. Restarted ranolazine without any improvement in her symptoms.    Continues to have chest pain with exertion which resolves with rest.  Is taking aspirin 81 mg as needed but does notice improvement in chest pain when she takes it.    Review of Systems   Constitutional: Negative for decreased appetite, diaphoresis and malaise/fatigue.   Cardiovascular: Positive for chest pain. Negative for irregular heartbeat, leg swelling, orthopnea, palpitations and syncope.   Respiratory: Negative for shortness of breath, sleep disturbances due to breathing and wheezing.    Gastrointestinal: Negative for abdominal pain, nausea and vomiting.   Neurological: Negative for dizziness, headaches, numbness, paresthesias and weakness.         Past Medical History:   Diagnosis Date   • Allergy    • Atopic dermatitis    • Depression    • Diabetes (HCC)    • Gastritis    • Hyperlipidemia    • Hypertension          Past Surgical History:   Procedure Laterality Date   • ZZZ CARDIAC CATH  04/25/2018    normal coronaires   • CHOLECYSTECTOMY  2007   • OTHER ORTHOPEDIC SURGERY  2007    shoulder surgery         Current Outpatient Medications   Medication Sig  Dispense Refill   • JARDIANCE 10 MG Tab TOME MEGGAN TABLETA TODOS LOS DRAPER 90 tablet 1   • meclizine (ANTIVERT) 25 MG Tab Take 1 Tab by mouth 3 times a day as needed. 30 Tab 0   • ranolazine (RANEXA) 500 MG TABLET SR 12 HR Take 500 mg by mouth 2 times a day.     • fluticasone (FLONASE) 50 MCG/ACT nasal spray Administer 2 Sprays into affected nostril(S) every day. 16 g 11   • omeprazole (PRILOSEC) 40 MG delayed-release capsule Take 1 Cap by mouth every day. 90 Cap 3   • albuterol 108 (90 Base) MCG/ACT Aero Soln inhalation aerosol Inhale 2 Puffs every 6 hours as needed for Shortness of Breath. 8.5 g 6   • ammonium lactate (LAC-HYDRIN) 12 % Lotion Apply thin layer to affected area (arms) twice daily as needed for 14 days 567 g 3   • meloxicam (MOBIC) 15 MG tablet Take 15 mg by mouth every day.     • atorvastatin (LIPITOR) 20 MG Tab TOME MEGGAN TABLETA TODOS LOS DRAPER 90 Tab 3   • levothyroxine (SYNTHROID) 25 MCG Tab TAKE 1 TAB BY MOUTH EVERY MORNING ON A EMPTY STOMACH 90 Tab 3   • metFORMIN (GLUCOPHAGE) 500 MG Tab TOME MEGGAN TABLETA DOS VECES AL ROLAND CON LAS COMIDAS 180 Tab 3   • lisinopril (PRINIVIL) 10 MG Tab TOME MEGGAN TABLETA TODOS LOS DRAPER 90 Tab 3   • hydrOXYzine HCl (ATARAX) 25 MG Tab Take 1 Tab by mouth 3 times a day as needed for Itching. 30 Tab 0   • vitamin D, Ergocalciferol, (DRISDOL) 90340 units Cap capsule Take 1 Cap by mouth every 28 days. 3 Cap 3   • aspirin (ASA) 81 MG Chew Tab chewable tablet Take 1 Tab by mouth every day. 100 Tab 3   • Nutritional Supplements (VITAMIN D BOOSTER PO) Take  by mouth. (Patient not taking: Reported on 5/12/2021)     • Ascorbic Acid (VITAMIN C PO) Take  by mouth. (Patient not taking: Reported on 5/12/2021)     • glucose blood (TRUE METRIX BLOOD GLUCOSE TEST) strip Test blood sugars once daily 50 Strip 11   • cyclobenzaprine (FLEXERIL) 10 MG Tab Take 1 Tab by mouth 3 times a day as needed. (Patient not taking: Reported on 5/12/2021) 30 Tab 3   • Blood Glucose Monitoring Suppl (TRUE  METRIX METER) w/Device Kit USE ONCE DAILY TO TEST BLOOD SUGAR 1 Kit 0   • TRUEPLUS SAFETY LANCETS 28G Misc        No current facility-administered medications for this visit.         Allergies   Allergen Reactions   • Shellfish Allergy Hives and Rash     Shellfish         Family History   Problem Relation Age of Onset   • Cancer Mother         lung cancer, chronic smoker   • Lung Disease Mother    • Heart Attack Mother    • Diabetes Father          Social History     Socioeconomic History   • Marital status:      Spouse name: Not on file   • Number of children: Not on file   • Years of education: Not on file   • Highest education level: Not on file   Occupational History   • Not on file   Tobacco Use   • Smoking status: Former Smoker     Packs/day: 1.00     Years: 15.00     Pack years: 15.00     Quit date:      Years since quittin.3   • Smokeless tobacco: Never Used   Vaping Use   • Vaping Use: Never used   Substance and Sexual Activity   • Alcohol use: Not Currently   • Drug use: Never   • Sexual activity: Yes     Partners: Male     Comment: menopause 2015   Other Topics Concern   • Not on file   Social History Narrative   • Not on file     Social Determinants of Health     Financial Resource Strain:    • Difficulty of Paying Living Expenses:    Food Insecurity:    • Worried About Running Out of Food in the Last Year:    • Ran Out of Food in the Last Year:    Transportation Needs:    • Lack of Transportation (Medical):    • Lack of Transportation (Non-Medical):    Physical Activity:    • Days of Exercise per Week:    • Minutes of Exercise per Session:    Stress:    • Feeling of Stress :    Social Connections:    • Frequency of Communication with Friends and Family:    • Frequency of Social Gatherings with Friends and Family:    • Attends [employer] Services:    • Active Member of Clubs or Organizations:    • Attends Club or Organization Meetings:    • Marital Status:    Intimate Partner Violence:   "  • Fear of Current or Ex-Partner:    • Emotionally Abused:    • Physically Abused:    • Sexually Abused:          Physical Exam:  Ambulatory Vitals  /76 (BP Location: Left arm, Patient Position: Sitting, BP Cuff Size: Adult)   Pulse 94   Resp 12   Ht 1.549 m (5' 1\")   Wt 65.8 kg (145 lb)   SpO2 97%    Oxygen Therapy:  Pulse Oximetry: 97 %  BP Readings from Last 4 Encounters:   05/12/21 124/76   02/25/21 115/60   02/08/21 122/84   01/20/21 121/69       Weight/BMI: Body mass index is 27.4 kg/m².  Wt Readings from Last 4 Encounters:   05/12/21 65.8 kg (145 lb)   02/25/21 65.2 kg (143 lb 12.8 oz)   02/08/21 66.2 kg (146 lb)   01/20/21 65.8 kg (145 lb)       GEN: Well developed, well nourished and in no acute distress.  HEART: no significant JVD, regular rate and rhythm, normal S1 and S2, no murmurs, no third heart sounds, normal cardiac palpation  LUNG: clear to auscultation bilaterally, no wheezing, no crackles, normal respiratory effort on room air  ABDOMEN: soft, non-tender, non-distended, normal bowel sounds throughout  EXTREMITIES: no peripheral edema noted  VASCULAR: no significantly elevated jugular venous pressure, no carotid bruits noted, radial pulses 2+ and equal      Lab Data Review:  Lab Results   Component Value Date/Time    CHOLSTRLTOT 131 03/04/2021 10:26 AM    LDL 63 03/04/2021 10:26 AM    HDL 47 03/04/2021 10:26 AM    TRIGLYCERIDE 107 03/04/2021 10:26 AM       Lab Results   Component Value Date/Time    SODIUM 136 03/04/2021 10:26 AM    POTASSIUM 4.4 03/04/2021 10:26 AM    CHLORIDE 102 03/04/2021 10:26 AM    CO2 25 03/04/2021 10:26 AM    GLUCOSE 108 (H) 03/04/2021 10:26 AM    BUN 14 03/04/2021 10:26 AM    CREATININE 0.67 03/04/2021 10:26 AM     Lab Results   Component Value Date/Time    ALKPHOSPHAT 96 03/04/2021 10:26 AM    ASTSGOT 21 03/04/2021 10:26 AM    ALTSGPT 26 03/04/2021 10:26 AM    TBILIRUBIN 0.3 03/04/2021 10:26 AM      Lab Results   Component Value Date/Time    WBC 6.0 03/04/2021 " 10:26 AM     Lab Results   Component Value Date/Time    HBA1C 6.6 (A) 01/20/2021 11:17 AM    HBA1C 6.0 (A) 10/08/2020 11:20 AM       Cardiac Imaging and Procedures Reviewed      Assessment & Plan     1. Microvascular angina (HCC)     2. Dyslipidemia     3. Essential hypertension         After discussion with the patient, she is interested in doing a re-trial of Imdur.  Discussed taking Tylenol prior to taking Imdur to prevent side effect of headaches.  If patient is unable to tolerate Imdur, consider addition of beta-blocker.    Recommend aspirin 81 mg daily instead of taking on an as-needed basis given history of diabetes, hyperlipidemia and microvascular angina.  This has been discussed with the patient who is in agreement with the plan.    For hyperlipidemia, continue Lipitor 20 mg daily.    Blood pressure under excellent control.  Continue lisinopril 10 mg daily.    All of patient's excellent questions were answered to the best of my knowledge and to her satisfaction.  It was a pleasure seeing Ms. Shahida Price in my clinic today. Return in about 6 months (around 11/12/2021). Patient is aware to call the cardiology clinic with any questions or concerns.      Lew Hooks MD  [facility]  [facility], [address]  [address] [address]  Phone: [phone]  Fax: [phone]

## 2021-05-27 ENCOUNTER — OFFICE VISIT (OUTPATIENT)
Dept: MEDICAL GROUP | Facility: PHYSICIAN GROUP | Age: 61
End: 2021-05-27
Payer: MEDICARE

## 2021-05-27 VITALS
OXYGEN SATURATION: 97 % | SYSTOLIC BLOOD PRESSURE: 118 MMHG | HEART RATE: 86 BPM | RESPIRATION RATE: 15 BRPM | TEMPERATURE: 98.2 F | BODY MASS INDEX: 27.98 KG/M2 | WEIGHT: 148.2 LBS | HEIGHT: 61 IN | DIASTOLIC BLOOD PRESSURE: 70 MMHG

## 2021-05-27 DIAGNOSIS — Z12.31 ENCOUNTER FOR SCREENING MAMMOGRAM FOR BREAST CANCER: ICD-10-CM

## 2021-05-27 DIAGNOSIS — E11.00 TYPE 2 DIABETES MELLITUS WITH HYPEROSMOLARITY WITHOUT COMA, WITHOUT LONG-TERM CURRENT USE OF INSULIN (HCC): ICD-10-CM

## 2021-05-27 DIAGNOSIS — L03.019 PARONYCHIA OF FINGER, UNSPECIFIED LATERALITY: ICD-10-CM

## 2021-05-27 DIAGNOSIS — L98.9 SKIN LESION: ICD-10-CM

## 2021-05-27 PROBLEM — F39 MOOD DISORDER (HCC): Status: RESOLVED | Noted: 2017-04-17 | Resolved: 2021-05-27

## 2021-05-27 PROCEDURE — 99214 OFFICE O/P EST MOD 30 MIN: CPT | Performed by: FAMILY MEDICINE

## 2021-05-27 RX ORDER — CEPHALEXIN 500 MG/1
500 CAPSULE ORAL 4 TIMES DAILY
Qty: 28 CAPSULE | Refills: 0 | Status: SHIPPED | OUTPATIENT
Start: 2021-05-27 | End: 2021-06-03

## 2021-05-27 ASSESSMENT — FIBROSIS 4 INDEX: FIB4 SCORE: 1.05

## 2021-05-27 NOTE — PROGRESS NOTES
Subjective:     Chief Complaint   Patient presents with   • Nevus     on face    • Vertigo     better        HPI:   Shahida presents today to discuss the following.    Type 2 diabetes mellitus (HCC)  Chronic issue  Stable with A1c last at 6.6 4 months ago  On jardiance and metformin    Skin lesion  New issue  Started developing a skin lesion distal to the right lower eyelid that started as a macule/flat mole that evolved into a skin tag that it is itchy.     Paronychia of finger  Over 1 week developed a left index finger paronychia  Draining some pus  Requesting antibiotic.       Past Medical History:   Diagnosis Date   • Allergy    • Atopic dermatitis    • Depression    • Diabetes (HCC)    • Gastritis    • Hyperlipidemia    • Hypertension        Current Outpatient Medications Ordered in Epic   Medication Sig Dispense Refill   • cephALEXin (KEFLEX) 500 MG Cap Take 1 capsule by mouth 4 times a day for 7 days. 28 capsule 0   • JARDIANCE 10 MG Tab TOME MEGGAN TABLETA TODOS LOS DRAPER 90 tablet 1   • meclizine (ANTIVERT) 25 MG Tab Take 1 Tab by mouth 3 times a day as needed. 30 Tab 0   • ranolazine (RANEXA) 500 MG TABLET SR 12 HR Take 500 mg by mouth 2 times a day.     • fluticasone (FLONASE) 50 MCG/ACT nasal spray Administer 2 Sprays into affected nostril(S) every day. 16 g 11   • omeprazole (PRILOSEC) 40 MG delayed-release capsule Take 1 Cap by mouth every day. 90 Cap 3   • albuterol 108 (90 Base) MCG/ACT Aero Soln inhalation aerosol Inhale 2 Puffs every 6 hours as needed for Shortness of Breath. 8.5 g 6   • ammonium lactate (LAC-HYDRIN) 12 % Lotion Apply thin layer to affected area (arms) twice daily as needed for 14 days 567 g 3   • meloxicam (MOBIC) 15 MG tablet Take 15 mg by mouth every day.     • atorvastatin (LIPITOR) 20 MG Tab TOME MEGGAN TABLETA TODOS LOS DRAPER 90 Tab 3   • levothyroxine (SYNTHROID) 25 MCG Tab TAKE 1 TAB BY MOUTH EVERY MORNING ON A EMPTY STOMACH 90 Tab 3   • metFORMIN (GLUCOPHAGE) 500 MG Tab TOME MEGGAN  "TABLETA DOS VECES AL ROLAND CON LAS COMIDAS 180 Tab 3   • lisinopril (PRINIVIL) 10 MG Tab TOME MEGGAN TABLETA TODOS LOS DRAPER 90 Tab 3   • hydrOXYzine HCl (ATARAX) 25 MG Tab Take 1 Tab by mouth 3 times a day as needed for Itching. 30 Tab 0   • glucose blood (TRUE METRIX BLOOD GLUCOSE TEST) strip Test blood sugars once daily 50 Strip 11   • cyclobenzaprine (FLEXERIL) 10 MG Tab Take 1 Tab by mouth 3 times a day as needed. 30 Tab 3   • aspirin (ASA) 81 MG Chew Tab chewable tablet Take 1 Tab by mouth every day. 100 Tab 3   • Blood Glucose Monitoring Suppl (TRUE METRIX METER) w/Device Kit USE ONCE DAILY TO TEST BLOOD SUGAR 1 Kit 0   • TRUEPLUS SAFETY LANCETS 28G Misc        No current Epic-ordered facility-administered medications on file.       Allergies:  Shellfish allergy    Health Maintenance: Completed    ROS:  Gen: no fevers/chills, no changes in weight  Eyes: no changes in vision  Pulm: no sob, no cough  CV: no chest pain, no palpitations  GI: no nausea/vomiting, no diarrhea      Objective:     Exam:  /70 (BP Location: Left arm, Patient Position: Sitting, BP Cuff Size: Adult)   Pulse 86   Temp 36.8 °C (98.2 °F) (Temporal)   Resp 15   Ht 1.549 m (5' 1\")   Wt 67.2 kg (148 lb 3.2 oz)   SpO2 97%   BMI 28.00 kg/m²  Body mass index is 28 kg/m².        Constitutional: Alert, no distress, well-groomed.  Skin: Warm, dry, good turgor, inspection of her right zygomatic region shows evidence of a flesh-colored skin lesion that appears like a skin tag that feels rough and hard to touch.  Eye: Equal, round and reactive, conjunctiva clear, lids normal.  ENMT: Lips without lesions, good dentition, moist mucous membranes.  Neck: Trachea midline, no masses, no thyromegaly.  Respiratory: Unlabored respiratory effort, no cough.  MSK: Normal gait, moves all extremities.  Neuro: Grossly non-focal.   Psych: Alert and oriented x3, normal affect and mood.        Assessment & Plan:     60 y.o. female with the following -     1. Type " 2 diabetes mellitus with hyperosmolarity without coma, without long-term current use of insulin (HCC)  This is a chronic, stable condition.  Due for repeat labs 2 months from now.  For now continue with Jardiance and Metformin.  - Hemoglobin A1c; Future    2. Skin lesion  This is a new problem.  She has developed a new skin lesion to her right zygomatic region with atypical behavior.  Unknown etiology.  I would like to send to dermatology for further assessment and biopsy.  - REFERRAL TO DERMATOLOGY    3. Paronychia of finger, unspecified laterality  New problem.  We will treat with antibiotic today.  - cephALEXin (KEFLEX) 500 MG Cap; Take 1 capsule by mouth 4 times a day for 7 days.  Dispense: 28 capsule; Refill: 0    4. Encounter for screening mammogram for breast cancer  - MA-SCREENING MAMMO BILAT W/TOMOSYNTHESIS W/CAD; Future      Return in about 6 months (around 11/27/2021).    Please note that this dictation was created using voice recognition software. I have made every reasonable attempt to correct obvious errors, but I expect that there are errors of grammar and possibly content that I did not discover before finalizing the note.

## 2021-05-27 NOTE — ASSESSMENT & PLAN NOTE
New issue  Started developing a skin lesion distal to the right lower eyelid that started as a macule/flat mole that evolved into a skin tag that it is itchy.

## 2021-06-27 DIAGNOSIS — E11.40 CONTROLLED TYPE 2 DIABETES MELLITUS WITH DIABETIC NEUROPATHY, WITHOUT LONG-TERM CURRENT USE OF INSULIN (HCC): ICD-10-CM

## 2021-06-28 RX ORDER — CALCIUM CITRATE/VITAMIN D3 200MG-6.25
TABLET ORAL
Qty: 50 STRIP | Refills: 11 | Status: SHIPPED | OUTPATIENT
Start: 2021-06-28

## 2021-07-01 ENCOUNTER — OFFICE VISIT (OUTPATIENT)
Dept: MEDICAL GROUP | Facility: PHYSICIAN GROUP | Age: 61
End: 2021-07-01
Payer: MEDICARE

## 2021-07-01 VITALS
HEART RATE: 109 BPM | TEMPERATURE: 98.4 F | WEIGHT: 148.6 LBS | BODY MASS INDEX: 28.05 KG/M2 | DIASTOLIC BLOOD PRESSURE: 64 MMHG | HEIGHT: 61 IN | SYSTOLIC BLOOD PRESSURE: 106 MMHG | OXYGEN SATURATION: 95 %

## 2021-07-01 DIAGNOSIS — K05.10 SUPERFICIAL INJURY OF GINGIVA WITH INFECTION, INITIAL ENCOUNTER: ICD-10-CM

## 2021-07-01 DIAGNOSIS — L20.82 FLEXURAL ECZEMA: ICD-10-CM

## 2021-07-01 DIAGNOSIS — S00.502A SUPERFICIAL INJURY OF GINGIVA WITH INFECTION, INITIAL ENCOUNTER: ICD-10-CM

## 2021-07-01 PROCEDURE — 99214 OFFICE O/P EST MOD 30 MIN: CPT | Performed by: FAMILY MEDICINE

## 2021-07-01 RX ORDER — ISOSORBIDE DINITRATE 30 MG/1
30 TABLET ORAL DAILY
COMMUNITY
End: 2021-07-01 | Stop reason: SDUPTHER

## 2021-07-01 RX ORDER — TRIAMCINOLONE ACETONIDE 1 MG/G
CREAM TOPICAL
Qty: 80 G | Refills: 3 | Status: SHIPPED | OUTPATIENT
Start: 2021-07-01 | End: 2021-10-14 | Stop reason: SDUPTHER

## 2021-07-01 RX ORDER — ISOSORBIDE DINITRATE 30 MG/1
30 TABLET ORAL DAILY
Qty: 120 TABLET | Refills: 1 | Status: SHIPPED | OUTPATIENT
Start: 2021-07-01 | End: 2021-11-05

## 2021-07-01 RX ORDER — AMOXICILLIN AND CLAVULANATE POTASSIUM 875; 125 MG/1; MG/1
1 TABLET, FILM COATED ORAL 2 TIMES DAILY
Qty: 10 TABLET | Refills: 0 | Status: SHIPPED | OUTPATIENT
Start: 2021-07-01 | End: 2021-07-06

## 2021-07-01 ASSESSMENT — FIBROSIS 4 INDEX: FIB4 SCORE: 1.05

## 2021-07-01 NOTE — ASSESSMENT & PLAN NOTE
Chronic issue  Flaring up  Has developed recurrent rash to her right shin area  Has used ammonium lactate in the past  Requesting refill today

## 2021-07-01 NOTE — ASSESSMENT & PLAN NOTE
New issue  Has had inflammation to her left lower gum  Patient feels like her gum is infected  Unable to put on her plaque due to inflammation  Does not have a dentist  Requesting abx tx.

## 2021-07-01 NOTE — PROGRESS NOTES
Subjective:     Chief Complaint   Patient presents with   • Rash     x1 week, worsening, right leg, medication not working,       HPI:   Shahida presents today to discuss the following.    Flexural eczema  Chronic issue  Flaring up  Has developed recurrent rash to her right shin area  Has used ammonium lactate in the past  Requesting refill today    Superficial injury of gum with infection  New issue  Has had inflammation to her left lower gum  Patient feels like her gum is infected  Unable to put on her plaque due to inflammation  Does not have a dentist  Requesting abx tx.       Past Medical History:   Diagnosis Date   • Allergy    • Atopic dermatitis    • Depression    • Diabetes (HCC)    • Gastritis    • Hyperlipidemia    • Hypertension        Current Outpatient Medications Ordered in Epic   Medication Sig Dispense Refill   • triamcinolone acetonide (KENALOG) 0.1 % Cream Apply to affected areas twice daily for 14 days 80 g 3   • isosorbide dinitrate (ISORDIL) 30 MG Tab Take 1 tablet by mouth every day. 120 tablet 1   • amoxicillin-clavulanate (AUGMENTIN) 875-125 MG Tab Take 1 tablet by mouth 2 times a day for 5 days. 10 tablet 0   • glucose blood (TRUE METRIX BLOOD GLUCOSE TEST) strip USE TO TEST HIGH OR LOW BLOOD SUGAR ONCE A DAY *E11.8* 50 Strip 11   • JARDIANCE 10 MG Tab TOME MEGGAN TABLETA TODOS LOS DRAPER 90 tablet 1   • fluticasone (FLONASE) 50 MCG/ACT nasal spray Administer 2 Sprays into affected nostril(S) every day. 16 g 11   • omeprazole (PRILOSEC) 40 MG delayed-release capsule Take 1 Cap by mouth every day. 90 Cap 3   • albuterol 108 (90 Base) MCG/ACT Aero Soln inhalation aerosol Inhale 2 Puffs every 6 hours as needed for Shortness of Breath. 8.5 g 6   • ammonium lactate (LAC-HYDRIN) 12 % Lotion Apply thin layer to affected area (arms) twice daily as needed for 14 days 567 g 3   • meloxicam (MOBIC) 15 MG tablet Take 15 mg by mouth every day.     • atorvastatin (LIPITOR) 20 MG Tab TOME MEGGAN TABLETA TODOS LOS  "DRAPER 90 Tab 3   • levothyroxine (SYNTHROID) 25 MCG Tab TAKE 1 TAB BY MOUTH EVERY MORNING ON A EMPTY STOMACH 90 Tab 3   • metFORMIN (GLUCOPHAGE) 500 MG Tab TOME MEGGAN TABLETA DOS VECES AL ROLAND CON LAS COMIDAS 180 Tab 3   • lisinopril (PRINIVIL) 10 MG Tab TOME MEGGAN TABLETA TODOS LOS DRAPER 90 Tab 3   • hydrOXYzine HCl (ATARAX) 25 MG Tab Take 1 Tab by mouth 3 times a day as needed for Itching. 30 Tab 0   • cyclobenzaprine (FLEXERIL) 10 MG Tab Take 1 Tab by mouth 3 times a day as needed. 30 Tab 3   • aspirin (ASA) 81 MG Chew Tab chewable tablet Take 1 Tab by mouth every day. 100 Tab 3   • Blood Glucose Monitoring Suppl (TRUE METRIX METER) w/Device Kit USE ONCE DAILY TO TEST BLOOD SUGAR 1 Kit 0   • TRUEPLUS SAFETY LANCETS 28G Misc        No current Epic-ordered facility-administered medications on file.       Allergies:  Shellfish allergy    Health Maintenance: Completed    ROS:  Gen: no fevers/chills, no changes in weight  Eyes: no changes in vision  Pulm: no sob, no cough  CV: no chest pain, no palpitations  GI: no nausea/vomiting, no diarrhea      Objective:     Exam:  /64 (BP Location: Left arm, Patient Position: Sitting, BP Cuff Size: Adult)   Pulse (!) 109   Temp 36.9 °C (98.4 °F) (Temporal)   Ht 1.549 m (5' 1\")   Wt 67.4 kg (148 lb 9.6 oz)   SpO2 95%   BMI 28.08 kg/m²  Body mass index is 28.08 kg/m².          Constitutional: Alert, no distress, well-groomed.  Skin: Right shin area shows evidence of erythematous rash.    Eye: Equal, round and reactive, conjunctiva clear, lids normal.  ENMT: Lips without lesions, inspection of her left lower gum shows evidence of gingivitis with no obvious ulceration.  There is surrounding erythema with no purulent drainage.  Neck: Trachea midline, no masses, no thyromegaly.  Respiratory: Unlabored respiratory effort, no cough.  MSK: Normal gait, moves all extremities.  Neuro: Grossly non-focal.   Psych: Alert and oriented x3, normal affect and mood.        Assessment & Plan: "     60 y.o. female with the following -     1. Flexural eczema  This is a chronic, worsening condition.  Developing a flare of eczema to her leg.  We will treat with Kenalog cream today for 14 days.  - triamcinolone acetonide (KENALOG) 0.1 % Cream; Apply to affected areas twice daily for 14 days  Dispense: 80 g; Refill: 3    2. Superficial injury of gingiva with infection, initial encounter  New problem.  Developing gingivitis to her left lower gum.  She has a history of root canal to this area.  She does not have a dentist.  I strongly recommend she tries to establish with one.  I will provide her with Augmentin today with return precautions.  - amoxicillin-clavulanate (AUGMENTIN) 875-125 MG Tab; Take 1 tablet by mouth 2 times a day for 5 days.  Dispense: 10 tablet; Refill: 0      No follow-ups on file.    Please note that this dictation was created using voice recognition software. I have made every reasonable attempt to correct obvious errors, but I expect that there are errors of grammar and possibly content that I did not discover before finalizing the note.

## 2021-09-19 DIAGNOSIS — E78.5 DYSLIPIDEMIA: ICD-10-CM

## 2021-09-19 DIAGNOSIS — E11.00 TYPE 2 DIABETES MELLITUS WITH HYPEROSMOLARITY WITHOUT COMA, WITHOUT LONG-TERM CURRENT USE OF INSULIN (HCC): ICD-10-CM

## 2021-09-20 RX ORDER — ATORVASTATIN CALCIUM 20 MG/1
TABLET, FILM COATED ORAL
Qty: 90 TABLET | Refills: 3 | Status: SHIPPED | OUTPATIENT
Start: 2021-09-20 | End: 2022-11-29

## 2021-09-22 DIAGNOSIS — E03.9 ACQUIRED HYPOTHYROIDISM: ICD-10-CM

## 2021-09-23 RX ORDER — LEVOTHYROXINE SODIUM 0.03 MG/1
25 TABLET ORAL EVERY MORNING
Qty: 90 TABLET | Refills: 3 | Status: SHIPPED | OUTPATIENT
Start: 2021-09-23

## 2021-10-14 ENCOUNTER — OFFICE VISIT (OUTPATIENT)
Dept: MEDICAL GROUP | Facility: PHYSICIAN GROUP | Age: 61
End: 2021-10-14
Payer: MEDICARE

## 2021-10-14 VITALS
WEIGHT: 148 LBS | HEIGHT: 61 IN | SYSTOLIC BLOOD PRESSURE: 118 MMHG | DIASTOLIC BLOOD PRESSURE: 62 MMHG | OXYGEN SATURATION: 98 % | BODY MASS INDEX: 27.94 KG/M2 | HEART RATE: 88 BPM | RESPIRATION RATE: 18 BRPM | TEMPERATURE: 97.4 F

## 2021-10-14 DIAGNOSIS — L03.019 PARONYCHIA OF FINGER, UNSPECIFIED LATERALITY: ICD-10-CM

## 2021-10-14 DIAGNOSIS — L20.84 INTRINSIC ECZEMA: ICD-10-CM

## 2021-10-14 DIAGNOSIS — L20.82 FLEXURAL ECZEMA: ICD-10-CM

## 2021-10-14 DIAGNOSIS — E11.00 TYPE 2 DIABETES MELLITUS WITH HYPEROSMOLARITY WITHOUT COMA, WITHOUT LONG-TERM CURRENT USE OF INSULIN (HCC): ICD-10-CM

## 2021-10-14 PROCEDURE — 99213 OFFICE O/P EST LOW 20 MIN: CPT | Performed by: STUDENT IN AN ORGANIZED HEALTH CARE EDUCATION/TRAINING PROGRAM

## 2021-10-14 RX ORDER — DOXYCYCLINE HYCLATE 100 MG
100 TABLET ORAL 2 TIMES DAILY
Qty: 10 TABLET | Refills: 0 | Status: SHIPPED | OUTPATIENT
Start: 2021-10-14 | End: 2021-11-05

## 2021-10-14 RX ORDER — TRIAMCINOLONE ACETONIDE 1 MG/G
CREAM TOPICAL
Qty: 80 G | Refills: 3 | Status: SHIPPED | OUTPATIENT
Start: 2021-10-14 | End: 2022-03-10 | Stop reason: SDUPTHER

## 2021-10-14 ASSESSMENT — FIBROSIS 4 INDEX: FIB4 SCORE: 1.07

## 2021-10-14 NOTE — PROGRESS NOTES
CC:  Diagnoses of Paronychia of finger, unspecified laterality, Intrinsic eczema, Type 2 diabetes mellitus with hyperosmolarity without coma, without long-term current use of insulin (HCC), and Flexural eczema were pertinent to this visit.    HISTORY OF THE PRESENT ILLNESS: Patient is a 61 y.o. female. This pleasant patient is here today to discuss 3 issues. Her  PCP is Dr. Thomas Garcia MD.    1. Tamiko Potter has had many paronychia in the past.  This 1 began 2 weeks ago and developed a large pustule.  Her daughter who is a surgical assistant during this for her.  While this did greatly relieve her pain she has continuing swelling and color change moving of her finger.  She has tried no other treatments.    2. Eczema  She has developed a dry itchy rash on the anterior of her right shin only.  This developed about 2 weeks ago.  He has had flexural eczema in the past and other than location this is very similar.  She has attempted no treatments.    3. Diabetes mellitis  She needs a refill on her Metformin.    No problem-specific Assessment & Plan notes found for this encounter.      Current Outpatient Medications Ordered in Epic   Medication Sig Dispense Refill   • metFORMIN (GLUCOPHAGE) 500 MG Tab TOME MEGGAN TABLETA DOS VECES AL ROLAND CON LAS COMIDAS 180 Tablet 3   • triamcinolone acetonide (KENALOG) 0.1 % Cream Apply to affected areas twice daily for 14 days 80 g 3   • doxycycline (VIBRAMYCIN) 100 MG Tab Take 1 Tablet by mouth 2 times a day. 10 Tablet 0   • levothyroxine (SYNTHROID) 25 MCG Tab TAKE 1 TAB BY MOUTH EVERY MORNING ON A EMPTY STOMACH 90 Tablet 3   • atorvastatin (LIPITOR) 20 MG Tab TOME MEGGAN TABLETA TODOS LOS DRAPER 90 Tablet 3   • lisinopril (PRINIVIL) 10 MG Tab TOME MEGGAN TABLETA TODOS LOS DRAPER 90 Tablet 3   • isosorbide dinitrate (ISORDIL) 30 MG Tab Take 1 tablet by mouth every day. 120 tablet 1   • glucose blood (TRUE METRIX BLOOD GLUCOSE TEST) strip USE TO TEST HIGH OR LOW BLOOD SUGAR ONCE A DAY  "*E11.8* 50 Strip 11   • JARDIANCE 10 MG Tab TOME MEGGAN TABLETA TODOS LOS DRAPER 90 tablet 1   • fluticasone (FLONASE) 50 MCG/ACT nasal spray Administer 2 Sprays into affected nostril(S) every day. 16 g 11   • omeprazole (PRILOSEC) 40 MG delayed-release capsule Take 1 Cap by mouth every day. 90 Cap 3   • albuterol 108 (90 Base) MCG/ACT Aero Soln inhalation aerosol Inhale 2 Puffs every 6 hours as needed for Shortness of Breath. 8.5 g 6   • ammonium lactate (LAC-HYDRIN) 12 % Lotion Apply thin layer to affected area (arms) twice daily as needed for 14 days 567 g 3   • meloxicam (MOBIC) 15 MG tablet Take 15 mg by mouth every day.     • hydrOXYzine HCl (ATARAX) 25 MG Tab Take 1 Tab by mouth 3 times a day as needed for Itching. 30 Tab 0   • cyclobenzaprine (FLEXERIL) 10 MG Tab Take 1 Tab by mouth 3 times a day as needed. 30 Tab 3   • aspirin (ASA) 81 MG Chew Tab chewable tablet Take 1 Tab by mouth every day. 100 Tab 3   • Blood Glucose Monitoring Suppl (TRUE METRIX METER) w/Device Kit USE ONCE DAILY TO TEST BLOOD SUGAR 1 Kit 0   • TRUEPLUS SAFETY LANCETS 28G Misc        No current Epic-ordered facility-administered medications on file.     ROS:   Gen: no fevers/chills, unplanned changes in weight  Skin: See HPI.  : No nocturia.      Objective:     Exam: /62 (BP Location: Left arm, Patient Position: Sitting, BP Cuff Size: Adult)   Pulse 88   Temp 36.3 °C (97.4 °F) (Temporal)   Resp 18   Ht 1.549 m (5' 1\")   Wt 67.1 kg (148 lb)   SpO2 98%  Body mass index is 27.96 kg/m².    General: Normal appearing. No distress.  Pulmonary: Clear to ausculation.  Normal effort. No rales, ronchi, or wheezing.  Cardiovascular: Regular rate and rhythm without murmur. Radial pulses are intact and equal bilaterally.  Neurologic: Grossly nonfocal  Skin: Warm and dry.   Left middle finger: Evidence of drained paronychia of the lateral margin of the nailbed of the left middle finger.  Very dark erythema and obvious swelling up to the " first joint.  No drainage or fluctuance at this time.  Right anterior shin: Erythematous papules and dry skin evident on the right anterior shin.  Evidence of infection.      A chaperone was offered to the patient during today's exam. Patient declined chaperone.    Labs: Pertinent labs.    Assessment & Plan:   61 y.o. female with the following -    1. Paronychia of finger, unspecified laterality  -Acute, recurrent.  -Most recently treated with Keflex.  -Doxycycline 100 mg twice daily x5 days.    2. Intrinsic eczema  -Acute.  -Triamcinolone cream 0.1% to be applied twice daily as needed.    3.  Diabetes mellitus  -Chronic, stable.  -Metformin 500 mg twice daily.  Dispense 180.  Refill 3.      Return if symptoms worsen or fail to improve.    Please note that this dictation was created using voice recognition software. I have made every reasonable attempt to correct obvious errors, but I expect that there are errors of grammar and possibly content that I did not discover before finalizing the note.    Ramirez Santoyo PA-C 10/14/2021

## 2021-10-15 DIAGNOSIS — E11.00 TYPE 2 DIABETES MELLITUS WITH HYPEROSMOLARITY WITHOUT COMA, WITHOUT LONG-TERM CURRENT USE OF INSULIN (HCC): ICD-10-CM

## 2021-10-15 RX ORDER — EMPAGLIFLOZIN 10 MG/1
TABLET, FILM COATED ORAL
Qty: 90 TABLET | Refills: 3 | Status: SHIPPED | OUTPATIENT
Start: 2021-10-15

## 2021-10-28 ENCOUNTER — OFFICE VISIT (OUTPATIENT)
Dept: MEDICAL GROUP | Facility: PHYSICIAN GROUP | Age: 61
End: 2021-10-28
Payer: MEDICARE

## 2021-10-28 VITALS
WEIGHT: 148 LBS | BODY MASS INDEX: 27.94 KG/M2 | TEMPERATURE: 97.8 F | DIASTOLIC BLOOD PRESSURE: 58 MMHG | HEIGHT: 61 IN | OXYGEN SATURATION: 94 % | SYSTOLIC BLOOD PRESSURE: 104 MMHG | HEART RATE: 105 BPM

## 2021-10-28 DIAGNOSIS — L03.019 PARONYCHIA OF FINGER, UNSPECIFIED LATERALITY: ICD-10-CM

## 2021-10-28 DIAGNOSIS — R07.89 ATYPICAL CHEST PAIN: ICD-10-CM

## 2021-10-28 PROCEDURE — 99214 OFFICE O/P EST MOD 30 MIN: CPT | Performed by: FAMILY MEDICINE

## 2021-10-28 RX ORDER — DOXYCYCLINE HYCLATE 100 MG
100 TABLET ORAL 2 TIMES DAILY
Qty: 10 TABLET | Refills: 0 | Status: SHIPPED | OUTPATIENT
Start: 2021-10-28 | End: 2021-11-02

## 2021-10-28 RX ORDER — TRIAMCINOLONE ACETONIDE 1 MG/G
CREAM TOPICAL
Qty: 80 G | Refills: 3 | Status: SHIPPED | OUTPATIENT
Start: 2021-10-28 | End: 2021-11-05

## 2021-10-28 ASSESSMENT — FIBROSIS 4 INDEX: FIB4 SCORE: 1.07

## 2021-10-28 NOTE — ASSESSMENT & PLAN NOTE
This is a chronic condition.  The patient has had recurrent paronychia of the left index finger.  Previously evaluated 10/14/2021 and was prescribed doxycycline for 5 days.

## 2021-10-29 NOTE — PROGRESS NOTES
Subjective:     Chief Complaint   Patient presents with   • Finger Pain     swelling, pulsing, [id number]   • Palpitations     left arm and left side jaw, cuases pain, [id number]        HPI:   Shahida presents today to discuss the following.    Paronychia of finger  This is a chronic condition.  The patient has had recurrent paronychia of the left index finger.  Previously evaluated 10/14/2021 and was prescribed doxycycline for 5 days.    Atypical chest pain  Chronic issue  She follows up with cardiology locally  Next appt is 11/11/21  Workup in the past has been negative but does tke isosorbide dinitrate   She complains of this pain but not present today or this moment.   Does have some GERD. Takes omeprazole which helps.         Past Medical History:   Diagnosis Date   • Allergy    • Atopic dermatitis    • Depression    • Diabetes (HCC)    • Gastritis    • Hyperlipidemia    • Hypertension        Current Outpatient Medications Ordered in Epic   Medication Sig Dispense Refill   • triamcinolone acetonide (KENALOG) 0.1 % Cream Apply to affected finger twice daily for 21 days 80 g 3   • doxycycline (VIBRAMYCIN) 100 MG Tab Take 1 Tablet by mouth 2 times a day for 5 days. 10 Tablet 0   • JARDIANCE 10 MG Tab TOME MEGGAN TABLETA TODOS LOS DRAPER 90 Tablet 3   • metFORMIN (GLUCOPHAGE) 500 MG Tab TOME MEGGAN TABLETA DOS VECES AL ROLAND CON LAS COMIDAS 180 Tablet 3   • triamcinolone acetonide (KENALOG) 0.1 % Cream Apply to affected areas twice daily for 14 days 80 g 3   • doxycycline (VIBRAMYCIN) 100 MG Tab Take 1 Tablet by mouth 2 times a day. 10 Tablet 0   • levothyroxine (SYNTHROID) 25 MCG Tab TAKE 1 TAB BY MOUTH EVERY MORNING ON A EMPTY STOMACH 90 Tablet 3   • atorvastatin (LIPITOR) 20 MG Tab TOME MEGGAN TABLETA TODOS LOS DRAPER 90 Tablet 3   • lisinopril (PRINIVIL) 10 MG Tab TOME MEGGAN TABLETA TODOS LOS DRAPER 90 Tablet 3   • isosorbide dinitrate (ISORDIL) 30 MG Tab Take 1 tablet by mouth every day. 120 tablet 1   • glucose blood (TRUE METRIX BLOOD  "GLUCOSE TEST) strip USE TO TEST HIGH OR LOW BLOOD SUGAR ONCE A DAY *E11.8* 50 Strip 11   • fluticasone (FLONASE) 50 MCG/ACT nasal spray Administer 2 Sprays into affected nostril(S) every day. 16 g 11   • omeprazole (PRILOSEC) 40 MG delayed-release capsule Take 1 Cap by mouth every day. 90 Cap 3   • albuterol 108 (90 Base) MCG/ACT Aero Soln inhalation aerosol Inhale 2 Puffs every 6 hours as needed for Shortness of Breath. 8.5 g 6   • ammonium lactate (LAC-HYDRIN) 12 % Lotion Apply thin layer to affected area (arms) twice daily as needed for 14 days (Patient taking differently: Apply thin layer to affected area (arms) twice daily as needed for 14 days/ Patient unable to recieve) 567 g 3   • aspirin (ASA) 81 MG Chew Tab chewable tablet Take 1 Tab by mouth every day. 100 Tab 3   • Blood Glucose Monitoring Suppl (TRUE METRIX METER) w/Device Kit USE ONCE DAILY TO TEST BLOOD SUGAR 1 Kit 0   • TRUEPLUS SAFETY LANCETS 28G Misc        No current Epic-ordered facility-administered medications on file.       Allergies:  Shellfish allergy    Health Maintenance: Completed    ROS:  Gen: no fevers/chills, no changes in weight  Eyes: no changes in vision  Pulm: no sob, no cough  GI: no nausea/vomiting, no diarrhea      Objective:     Exam:  /58 (BP Location: Left arm, Patient Position: Sitting, BP Cuff Size: Adult)   Pulse (!) 105   Temp 36.6 °C (97.8 °F) (Temporal)   Ht 1.549 m (5' 1\")   Wt 67.1 kg (148 lb)   SpO2 94%   BMI 27.96 kg/m²  Body mass index is 27.96 kg/m².    Gen: Alert and oriented, No apparent distress.  HENT: TMs are clear. Oropharynx is w/o erythema or exudates. Neck is supple without cervical lymphadenopathy. No JVD.   Eyes: PERRLA  Lungs: Normal effort, CTA bilaterally, no wheezes, rhonchi, or rales  CV: Regular rate and rhythm. No murmurs, rubs, or gallops.  Abdomen: Soft, nontender, nondistended. Normal bowel sounds. Liver and spleen are not palpable  Ext: No clubbing, cyanosis, edema.  Skin: no " rash, lesions or ulcers  Neuro: Moves all extremities.  Psych: AAOx3      Assessment & Plan:     61 y.o. female with the following -     1. Paronychia of finger, unspecified laterality  This is a chronic condition.  At this time we will do a trial of Kenalog cream along with another 5 days of doxycycline.  - triamcinolone acetonide (KENALOG) 0.1 % Cream; Apply to affected finger twice daily for 21 days  Dispense: 80 g; Refill: 3  - doxycycline (VIBRAMYCIN) 100 MG Tab; Take 1 Tablet by mouth 2 times a day for 5 days.  Dispense: 10 Tablet; Refill: 0    2. Atypical chest pain  This is a chronic, unchanged condition.  The patient continues to have atypical chest pain.  She follows up with cardiology with next appointment on 11/11/2021.  She mentions that sometimes she gets pain radiating to her left jaw.  She feels as though these are new features.  She denies any chest pain today.  I have strongly recommended emergency room precautions if she starts developing this chest pain again.  She should also get a hold of her cardiologist for a sooner appointment.  The patient verbalized understanding.  For now she will continue with her current medications.      Return in about 4 weeks (around 11/25/2021).    Please note that this dictation was created using voice recognition software. I have made every reasonable attempt to correct obvious errors, but I expect that there are errors of grammar and possibly content that I did not discover before finalizing the note.

## 2021-10-29 NOTE — ASSESSMENT & PLAN NOTE
Chronic issue  She follows up with cardiology locally  Next appt is 11/11/21  Workup in the past has been negative but does tke isosorbide dinitrate   She complains of this pain but not present today or this moment.   Does have some GERD. Takes omeprazole which helps.

## 2021-11-03 ENCOUNTER — HOSPITAL ENCOUNTER (OUTPATIENT)
Dept: RADIOLOGY | Facility: MEDICAL CENTER | Age: 61
End: 2021-11-03
Attending: FAMILY MEDICINE
Payer: MEDICARE

## 2021-11-03 DIAGNOSIS — Z12.31 ENCOUNTER FOR SCREENING MAMMOGRAM FOR BREAST CANCER: ICD-10-CM

## 2021-11-03 PROCEDURE — 77063 BREAST TOMOSYNTHESIS BI: CPT

## 2021-11-05 ENCOUNTER — OFFICE VISIT (OUTPATIENT)
Dept: CARDIOLOGY | Facility: MEDICAL CENTER | Age: 61
End: 2021-11-05
Payer: MEDICARE

## 2021-11-05 VITALS
WEIGHT: 147 LBS | OXYGEN SATURATION: 95 % | HEIGHT: 61 IN | DIASTOLIC BLOOD PRESSURE: 78 MMHG | SYSTOLIC BLOOD PRESSURE: 122 MMHG | HEART RATE: 88 BPM | BODY MASS INDEX: 27.75 KG/M2 | RESPIRATION RATE: 18 BRPM

## 2021-11-05 DIAGNOSIS — I10 ESSENTIAL HYPERTENSION: ICD-10-CM

## 2021-11-05 DIAGNOSIS — E78.5 DYSLIPIDEMIA: ICD-10-CM

## 2021-11-05 DIAGNOSIS — I20.89 MICROVASCULAR ANGINA (HCC): ICD-10-CM

## 2021-11-05 PROCEDURE — 99214 OFFICE O/P EST MOD 30 MIN: CPT | Performed by: INTERNAL MEDICINE

## 2021-11-05 RX ORDER — ISOSORBIDE MONONITRATE 60 MG/1
60 TABLET, EXTENDED RELEASE ORAL EVERY MORNING
Qty: 30 TABLET | Refills: 3 | Status: SHIPPED | OUTPATIENT
Start: 2021-11-05 | End: 2021-11-30

## 2021-11-05 ASSESSMENT — ENCOUNTER SYMPTOMS
ORTHOPNEA: 0
SYNCOPE: 0
VOMITING: 0
DIZZINESS: 0
DIAPHORESIS: 0
HEADACHES: 0
SLEEP DISTURBANCES DUE TO BREATHING: 0
DECREASED APPETITE: 0
WEAKNESS: 0
WHEEZING: 0
PARESTHESIAS: 0
NAUSEA: 0
SHORTNESS OF BREATH: 1
PALPITATIONS: 0
NUMBNESS: 0
IRREGULAR HEARTBEAT: 0
ABDOMINAL PAIN: 0

## 2021-11-05 ASSESSMENT — FIBROSIS 4 INDEX: FIB4 SCORE: 1.07

## 2021-11-05 NOTE — PROGRESS NOTES
Cardiology Follow-up Consultation Note    Date of note:    11/5/2021    Primary Care Provider: Thomas Garcia M.D.    Name:             Shahida Price     YOB: 1960  MRN:               [id number]    Chief Complaint   Patient presents with   • Angina (Stable)     F/V Dx: Microvascular angina (HCC)       HISTORY OF PRESENT ILLNESS  Ms. Shahida Price is a 61 y.o. Serbian speaking, female who returns to see us for follow-up of microvascular angina, HTN, DM and ongoing chest pain.  Is accompanied by her son.    Last clinic visit: 5/12/2021    Interim History:  Since our last visit, patient reports few episodes of chest pain with exertion which radiated to her jaw and left arm.  Needed to rest and took aspirin which helped resolved her symptoms.  Associated symptoms of dyspnea.    Was started on isordil instead of imdur by her PCP which she has been taking.      Review of Systems   Constitutional: Negative for decreased appetite, diaphoresis and malaise/fatigue.   Cardiovascular: Positive for chest pain. Negative for irregular heartbeat, leg swelling, orthopnea, palpitations and syncope.   Respiratory: Positive for shortness of breath. Negative for sleep disturbances due to breathing and wheezing.    Gastrointestinal: Negative for abdominal pain, nausea and vomiting.   Neurological: Negative for dizziness, headaches, numbness, paresthesias and weakness.       Past Medical History:   Diagnosis Date   • Allergy    • Atopic dermatitis    • Depression    • Diabetes (HCC)    • Gastritis    • Hyperlipidemia    • Hypertension          Past Surgical History:   Procedure Laterality Date   • ZZZ CARDIAC CATH  04/25/2018    normal coronaires   • CHOLECYSTECTOMY  2007   • OTHER ORTHOPEDIC SURGERY  2007    shoulder surgery         Current Outpatient Medications   Medication Sig Dispense Refill   • isosorbide mononitrate SR (IMDUR) 60 MG TABLET SR 24 HR Take 1 Tablet by mouth every morning. 30 Tablet 3   •  JARDIANCE 10 MG Tab TOME MEGGAN TABLETA TODOS LOS DRAPER 90 Tablet 3   • metFORMIN (GLUCOPHAGE) 500 MG Tab TOME MEGGAN TABLETA DOS VECES AL ROLAND CON LAS COMIDAS 180 Tablet 3   • triamcinolone acetonide (KENALOG) 0.1 % Cream Apply to affected areas twice daily for 14 days 80 g 3   • levothyroxine (SYNTHROID) 25 MCG Tab TAKE 1 TAB BY MOUTH EVERY MORNING ON A EMPTY STOMACH 90 Tablet 3   • atorvastatin (LIPITOR) 20 MG Tab TOME MEGGAN TABLETA TODOS LOS DRAPER 90 Tablet 3   • lisinopril (PRINIVIL) 10 MG Tab TOME MEGGAN TABLETA TODOS LOS DRAPER 90 Tablet 3   • glucose blood (TRUE METRIX BLOOD GLUCOSE TEST) strip USE TO TEST HIGH OR LOW BLOOD SUGAR ONCE A DAY *E11.8* 50 Strip 11   • fluticasone (FLONASE) 50 MCG/ACT nasal spray Administer 2 Sprays into affected nostril(S) every day. 16 g 11   • omeprazole (PRILOSEC) 40 MG delayed-release capsule Take 1 Cap by mouth every day. 90 Cap 3   • albuterol 108 (90 Base) MCG/ACT Aero Soln inhalation aerosol Inhale 2 Puffs every 6 hours as needed for Shortness of Breath. 8.5 g 6   • ammonium lactate (LAC-HYDRIN) 12 % Lotion Apply thin layer to affected area (arms) twice daily as needed for 14 days (Patient taking differently: Apply thin layer to affected area (arms) twice daily as needed for 14 days/ Patient unable to recieve) 567 g 3   • aspirin (ASA) 81 MG Chew Tab chewable tablet Take 1 Tab by mouth every day. 100 Tab 3   • Blood Glucose Monitoring Suppl (TRUE METRIX METER) w/Device Kit USE ONCE DAILY TO TEST BLOOD SUGAR 1 Kit 0   • TRUEPLUS SAFETY LANCETS 28G Misc        No current facility-administered medications for this visit.         Allergies   Allergen Reactions   • Shellfish Allergy Hives and Rash     Shellfish         Family History   Problem Relation Age of Onset   • Cancer Mother         lung cancer, chronic smoker   • Lung Disease Mother    • Heart Attack Mother    • Diabetes Father          Social History     Socioeconomic History   • Marital status:      Spouse name: Not on  "file   • Number of children: Not on file   • Years of education: Not on file   • Highest education level: Not on file   Occupational History   • Not on file   Tobacco Use   • Smoking status: Former Smoker     Packs/day: 1.00     Years: 15.00     Pack years: 15.00     Quit date:      Years since quittin.8   • Smokeless tobacco: Never Used   Vaping Use   • Vaping Use: Never used   Substance and Sexual Activity   • Alcohol use: Not Currently   • Drug use: Never   • Sexual activity: Yes     Partners: Male     Comment: menopause 2015   Other Topics Concern   • Not on file   Social History Narrative   • Not on file     Social Determinants of Health     Financial Resource Strain:    • Difficulty of Paying Living Expenses:    Food Insecurity:    • Worried About Running Out of Food in the Last Year:    • Ran Out of Food in the Last Year:    Transportation Needs:    • Lack of Transportation (Medical):    • Lack of Transportation (Non-Medical):    Physical Activity:    • Days of Exercise per Week:    • Minutes of Exercise per Session:    Stress:    • Feeling of Stress :    Social Connections:    • Frequency of Communication with Friends and Family:    • Frequency of Social Gatherings with Friends and Family:    • Attends [employer]oration[employer] Services:    • Active Member of Clubs or Organizations:    • Attends Club or Organization Meetings:    • Marital Status:    Intimate Partner Violence:    • Fear of Current or Ex-Partner:    • Emotionally Abused:    • Physically Abused:    • Sexually Abused:          Physical Exam:  Ambulatory Vitals  /78 (BP Location: Left arm, Patient Position: Sitting, BP Cuff Size: Adult)   Pulse 88   Resp 18   Ht 1.549 m (5' 1\")   Wt 66.7 kg (147 lb)   SpO2 95%    Oxygen Therapy:  Pulse Oximetry: 95 %  BP Readings from Last 4 Encounters:   21 122/78   10/28/21 104/58   10/14/21 118/62   21 106/64       Weight/BMI: Body mass index is 27.78 kg/m².  Wt Readings from Last 4 Encounters: "   11/05/21 66.7 kg (147 lb)   10/28/21 67.1 kg (148 lb)   10/14/21 67.1 kg (148 lb)   07/01/21 67.4 kg (148 lb 9.6 oz)       GEN: Well developed, well nourished and in no acute distress.  HEART: no significant JVD, regular rate and rhythm, normal S1 and S2, no murmurs, no third heart sounds, normal cardiac palpation  LUNG: clear to auscultation bilaterally, no wheezing, no crackles, normal respiratory effort on room air  ABDOMEN: soft, non-tender, non-distended, normal bowel sounds throughout  EXTREMITIES: no peripheral edema noted  VASCULAR: no significantly elevated jugular venous pressure, no carotid bruits noted, radial pulses 2+ and equal      Lab Data Review:  Lab Results   Component Value Date/Time    CHOLSTRLTOT 131 03/04/2021 10:26 AM    LDL 63 03/04/2021 10:26 AM    HDL 47 03/04/2021 10:26 AM    TRIGLYCERIDE 107 03/04/2021 10:26 AM       Lab Results   Component Value Date/Time    SODIUM 136 03/04/2021 10:26 AM    POTASSIUM 4.4 03/04/2021 10:26 AM    CHLORIDE 102 03/04/2021 10:26 AM    CO2 25 03/04/2021 10:26 AM    GLUCOSE 108 (H) 03/04/2021 10:26 AM    BUN 14 03/04/2021 10:26 AM    CREATININE 0.67 03/04/2021 10:26 AM     Lab Results   Component Value Date/Time    ALKPHOSPHAT 96 03/04/2021 10:26 AM    ASTSGOT 21 03/04/2021 10:26 AM    ALTSGPT 26 03/04/2021 10:26 AM    TBILIRUBIN 0.3 03/04/2021 10:26 AM      Lab Results   Component Value Date/Time    WBC 6.0 03/04/2021 10:26 AM     Lab Results   Component Value Date/Time    HBA1C 6.6 (A) 01/20/2021 11:17 AM    HBA1C 6.0 (A) 10/08/2020 11:20 AM       Cardiac Imaging and Procedures Review:    EKG dated 2/8/2021: My personal interpretation is sinus rhythm        Assessment & Plan     1. Microvascular angina (HCC)  isosorbide mononitrate SR (IMDUR) 60 MG TABLET SR 24 HR   2. Essential hypertension  isosorbide mononitrate SR (IMDUR) 60 MG TABLET SR 24 HR   3. Dyslipidemia         Is currently taking Isordil instead of Imdur 30 mg daily.  Discussed that she should  be on Imdur for antianginal properties for microvascular angina.  Prescription sent for Imdur 60 mg daily.  Discussed adverse effect of headache for which she can take Tylenol.  Consider beta-blocker if patient is intolerant to Imdur.    Obtain records from patient's prior cardiologist RE:coronary angiogram which he underwent in 2020.    Blood pressure under well control.  Continue lisinopril 10 mg daily.    Lipid panel reviewed.  LDL within goal.  Continue atorvastatin 20 mg daily.    Continue aspirin 81 mg daily for microvascular angina.        All of patient and son's excellent questions were answered to the best of my knowledge and to their satisfaction.  It was a pleasure seeing Ms. Shahida Price in my clinic today. Return in about 8 weeks (around 12/31/2021). Patient is aware to call the cardiology clinic with any questions or concerns.      Lew Hooks MD  [facility]  [facility], [address]  [address] [address]  Phone: [phone]  Fax: [phone]    Please note that this dictation was created using voice recognition software. I have made every reasonable attempt to correct obvious errors, but it is possible there are errors of grammar and possibly content that I did not discover before finalizing the note.

## 2021-11-30 DIAGNOSIS — I10 ESSENTIAL HYPERTENSION: ICD-10-CM

## 2021-11-30 DIAGNOSIS — I20.89 MICROVASCULAR ANGINA (HCC): ICD-10-CM

## 2021-11-30 RX ORDER — ISOSORBIDE MONONITRATE 60 MG/1
TABLET, EXTENDED RELEASE ORAL
Qty: 90 TABLET | Refills: 3 | Status: SHIPPED | OUTPATIENT
Start: 2021-11-30

## 2021-12-01 ENCOUNTER — HOSPITAL ENCOUNTER (OUTPATIENT)
Dept: LAB | Facility: MEDICAL CENTER | Age: 61
End: 2021-12-01
Attending: FAMILY MEDICINE
Payer: MEDICARE

## 2021-12-01 DIAGNOSIS — E11.00 TYPE 2 DIABETES MELLITUS WITH HYPEROSMOLARITY WITHOUT COMA, WITHOUT LONG-TERM CURRENT USE OF INSULIN (HCC): ICD-10-CM

## 2021-12-01 LAB
EST. AVERAGE GLUCOSE BLD GHB EST-MCNC: 157 MG/DL
HBA1C MFR BLD: 7.1 % (ref 4–5.6)

## 2021-12-01 PROCEDURE — 36415 COLL VENOUS BLD VENIPUNCTURE: CPT | Mod: GA

## 2021-12-01 PROCEDURE — 83036 HEMOGLOBIN GLYCOSYLATED A1C: CPT | Mod: GA

## 2021-12-02 ENCOUNTER — OFFICE VISIT (OUTPATIENT)
Dept: MEDICAL GROUP | Facility: PHYSICIAN GROUP | Age: 61
End: 2021-12-02
Payer: MEDICARE

## 2021-12-02 VITALS
BODY MASS INDEX: 27.3 KG/M2 | HEIGHT: 61 IN | HEART RATE: 95 BPM | SYSTOLIC BLOOD PRESSURE: 128 MMHG | OXYGEN SATURATION: 94 % | TEMPERATURE: 98.2 F | DIASTOLIC BLOOD PRESSURE: 70 MMHG | WEIGHT: 144.6 LBS

## 2021-12-02 DIAGNOSIS — E11.00 TYPE 2 DIABETES MELLITUS WITH HYPEROSMOLARITY WITHOUT COMA, WITHOUT LONG-TERM CURRENT USE OF INSULIN (HCC): ICD-10-CM

## 2021-12-02 DIAGNOSIS — Z23 NEED FOR VACCINATION: ICD-10-CM

## 2021-12-02 DIAGNOSIS — M25.552 HIP PAIN, CHRONIC, LEFT: ICD-10-CM

## 2021-12-02 DIAGNOSIS — G89.29 HIP PAIN, CHRONIC, LEFT: ICD-10-CM

## 2021-12-02 PROCEDURE — G0008 ADMIN INFLUENZA VIRUS VAC: HCPCS | Performed by: FAMILY MEDICINE

## 2021-12-02 PROCEDURE — 99214 OFFICE O/P EST MOD 30 MIN: CPT | Mod: 25 | Performed by: FAMILY MEDICINE

## 2021-12-02 PROCEDURE — 90686 IIV4 VACC NO PRSV 0.5 ML IM: CPT | Performed by: FAMILY MEDICINE

## 2021-12-02 RX ORDER — METHOCARBAMOL 750 MG/1
750 TABLET, FILM COATED ORAL 4 TIMES DAILY
Qty: 56 TABLET | Refills: 0 | Status: SHIPPED | OUTPATIENT
Start: 2021-12-02 | End: 2022-05-16

## 2021-12-02 RX ORDER — MELOXICAM 7.5 MG/1
7.5 TABLET ORAL DAILY
Qty: 30 TABLET | Refills: 0 | Status: SHIPPED | OUTPATIENT
Start: 2021-12-02 | End: 2021-12-23

## 2021-12-02 ASSESSMENT — FIBROSIS 4 INDEX: FIB4 SCORE: 1.07

## 2021-12-02 NOTE — ASSESSMENT & PLAN NOTE
Chronic issue  Has had left hip pain  DX positive for mild OA and calcific tendonitis  Cant sleep on that side  Does not want injections

## 2021-12-02 NOTE — PROGRESS NOTES
Subjective:     Chief Complaint   Patient presents with   • Medication Management     Pain LT leg, RT arm, taking medication from [place], on going   • Referral Needed     Dentisit       HPI:   Shahida presents today to discuss the following.    Hip pain, chronic, left  Chronic issue  Has had left hip pain  DX positive for mild OA and calcific tendonitis  Cant sleep on that side  Does not want injections    Type 2 diabetes mellitus (HCC)  Chronic issue  A1c up at 7.1  On jardiance, metformin   Also on statin and ace      Past Medical History:   Diagnosis Date   • Allergy    • Atopic dermatitis    • Depression    • Diabetes (HCC)    • Gastritis    • Hyperlipidemia    • Hypertension        Current Outpatient Medications Ordered in Epic   Medication Sig Dispense Refill   • methocarbamol (ROBAXIN) 750 MG Tab Take 1 Tablet by mouth 4 times a day for 14 days. 56 Tablet 0   • meloxicam (MOBIC) 7.5 MG Tab Take 1 Tablet by mouth every day. 30 Tablet 0   • isosorbide mononitrate SR (IMDUR) 60 MG TABLET SR 24 HR TOME MEGGAN TABLETA TODOS LOS DRAPER EN LA MANANA 90 Tablet 3   • JARDIANCE 10 MG Tab TOME MEGGAN TABLETA TODOS LOS DRAPER 90 Tablet 3   • metFORMIN (GLUCOPHAGE) 500 MG Tab TOME MGEGAN TABLETA DOS VECES AL ROLAND CON LAS COMIDAS 180 Tablet 3   • triamcinolone acetonide (KENALOG) 0.1 % Cream Apply to affected areas twice daily for 14 days 80 g 3   • levothyroxine (SYNTHROID) 25 MCG Tab TAKE 1 TAB BY MOUTH EVERY MORNING ON A EMPTY STOMACH 90 Tablet 3   • atorvastatin (LIPITOR) 20 MG Tab TOME MEGGAN TABLETA TODOS LOS DRAPER 90 Tablet 3   • lisinopril (PRINIVIL) 10 MG Tab TOME MEGGAN TABLETA TODOS LOS DRAPER 90 Tablet 3   • glucose blood (TRUE METRIX BLOOD GLUCOSE TEST) strip USE TO TEST HIGH OR LOW BLOOD SUGAR ONCE A DAY *E11.8* 50 Strip 11   • fluticasone (FLONASE) 50 MCG/ACT nasal spray Administer 2 Sprays into affected nostril(S) every day. 16 g 11   • omeprazole (PRILOSEC) 40 MG delayed-release capsule Take 1 Cap by mouth every day. 90 Cap 3   •  "albuterol 108 (90 Base) MCG/ACT Aero Soln inhalation aerosol Inhale 2 Puffs every 6 hours as needed for Shortness of Breath. 8.5 g 6   • aspirin (ASA) 81 MG Chew Tab chewable tablet Take 1 Tab by mouth every day. 100 Tab 3   • Blood Glucose Monitoring Suppl (TRUE METRIX METER) w/Device Kit USE ONCE DAILY TO TEST BLOOD SUGAR 1 Kit 0   • TRUEPLUS SAFETY LANCETS 28G Misc        No current Epic-ordered facility-administered medications on file.       Allergies:  Shellfish allergy    Health Maintenance: Completed    ROS:  Gen: no fevers/chills, no changes in weight  Eyes: no changes in vision  Pulm: no sob, no cough  CV: no chest pain, no palpitations  GI: no nausea/vomiting, no diarrhea      Objective:     Exam:  /70 (BP Location: Right arm, Patient Position: Sitting, BP Cuff Size: Adult)   Pulse 95   Temp 36.8 °C (98.2 °F) (Temporal)   Ht 1.549 m (5' 1\")   Wt 65.6 kg (144 lb 9.6 oz)   SpO2 94%   BMI 27.32 kg/m²  Body mass index is 27.32 kg/m².      Constitutional: Alert, no distress, well-groomed.  Skin: Warm, dry, good turgor, no rashes in visible areas.  Eye: Equal, round and reactive, conjunctiva clear, lids normal.  ENMT: Lips without lesions, good dentition, moist mucous membranes.  Neck: Trachea midline, no masses, no thyromegaly.  Respiratory: Unlabored respiratory effort, no cough.  MSK: Normal gait, moves all extremities.  Neuro: Grossly non-focal.   Psych: Alert and oriented x3, normal affect and mood.        Assessment & Plan:     61 y.o. female with the following -     1. Hip pain, chronic, left  Chronic, unchanged condition.  We will proceed with a trial Robaxin and meloxicam and physical therapy.  - methocarbamol (ROBAXIN) 750 MG Tab; Take 1 Tablet by mouth 4 times a day for 14 days.  Dispense: 56 Tablet; Refill: 0  - meloxicam (MOBIC) 7.5 MG Tab; Take 1 Tablet by mouth every day.  Dispense: 30 Tablet; Refill: 0  - Referral to Physical Therapy    2. Type 2 diabetes mellitus with " hyperosmolarity without coma, without long-term current use of insulin (HCC)  Chronic, worsening condition.  A1c escalated to 7.1.  Patient feels committed to reinforce lifestyle change.  Continue with current regimen.    3. Need for vaccination  - INFLUENZA VACCINE QUAD INJ (PF)      No follow-ups on file.    Please note that this dictation was created using voice recognition software. I have made every reasonable attempt to correct obvious errors, but I expect that there are errors of grammar and possibly content that I did not discover before finalizing the note.

## 2021-12-05 DIAGNOSIS — E11.00 TYPE 2 DIABETES MELLITUS WITH HYPEROSMOLARITY WITHOUT COMA, WITHOUT LONG-TERM CURRENT USE OF INSULIN (HCC): ICD-10-CM

## 2021-12-23 DIAGNOSIS — M25.552 HIP PAIN, CHRONIC, LEFT: ICD-10-CM

## 2021-12-23 DIAGNOSIS — G89.29 HIP PAIN, CHRONIC, LEFT: ICD-10-CM

## 2021-12-23 RX ORDER — MELOXICAM 7.5 MG/1
TABLET ORAL
Qty: 30 TABLET | Refills: 0 | Status: SHIPPED | OUTPATIENT
Start: 2021-12-23 | End: 2022-01-31

## 2022-01-04 NOTE — OP THERAPY EVALUATION
Outpatient Physical Therapy  INITIAL EVALUATION    [facility] [place] [address], [address]  [place] [address]  Phone:  [phone]    Date of Evaluation: 2022    Patient: Shahida Price  YOB: 1960  MRN: [id number]     Referring Provider: Thomas Garcia M.D.  [address]  [place] [address]   Referring Diagnosis Hip pain, chronic, left [M25.552, G89.29]     Time Calculation                 Chief Complaint: No chief complaint on file.    Visit Diagnoses     ICD-10-CM   1. Hip pain, chronic, left  M25.552    G89.29       Date of onset of impairment: No data found    Subjective:   History of Present Illness:     Mechanism of injury:  PT did not start initial evaluation with patient; she left due to insurance costs associated with PT with . No billing this visit.                        Past Medical History:   Diagnosis Date   • Allergy    • Atopic dermatitis    • Depression    • Diabetes (HCC)    • Gastritis    • Hyperlipidemia    • Hypertension      Past Surgical History:   Procedure Laterality Date   • ZZZ CARDIAC CATH  2018    normal coronaires   • CHOLECYSTECTOMY     • OTHER ORTHOPEDIC SURGERY      shoulder surgery     Social History     Tobacco Use   • Smoking status: Former Smoker     Packs/day: 1.00     Years: 15.00     Pack years: 15.00     Quit date:      Years since quittin.0   • Smokeless tobacco: Never Used   Substance Use Topics   • Alcohol use: Not Currently     Family and Occupational History     Socioeconomic History   • Marital status:      Spouse name: Not on file   • Number of children: Not on file   • Years of education: Not on file   • Highest education level: Not on file   Occupational History   • Not on file       Objective        Time-based treatments/modalities:           Assessment, Response and Plan:   Goals:   Short Term Goals:   -patient meets [facility] for clinically meaningful change for her hip  for WOMAC  Short term goal time span:  2-4 weeks      Long Term Goals:    -patient meets [facility]ID for clinically meaningful change for her hip for WOMAC  Long term goal time span:  6-8 weeks    Plan:   Therapy options:  Physical therapy treatment to continue  Planned therapy interventions:  Manual Therapy (CPT 41994), Neuromuscular Re-education (CPT 78207), E Stim Unattended (CPT 44620), E Stim Attended (CPT 51424), Therapeutic Activities (CPT 12862), Therapeutic Exercise (CPT 72955), Hot or Cold Pack Therapy (CPT 50306), Mechanical Traction (CPT 60884) and Gait Training (CPT 36673)  Frequency:  2x week  Duration in weeks:  8  Duration in visits:  16  Discussed with:  Patient      Functional Assessment Used        Referring provider co-signature:  I have reviewed this plan of care and my co-signature certifies the need for services.    Certification Period: 01/05/2022 to  01/19/22    Physician Signature: ________________________________ Date: ______________

## 2022-01-05 ENCOUNTER — PHYSICAL THERAPY (OUTPATIENT)
Dept: PHYSICAL THERAPY | Facility: REHABILITATION | Age: 62
End: 2022-01-05
Attending: FAMILY MEDICINE
Payer: MEDICARE

## 2022-01-05 DIAGNOSIS — M25.552 HIP PAIN, CHRONIC, LEFT: ICD-10-CM

## 2022-01-05 DIAGNOSIS — G89.29 HIP PAIN, CHRONIC, LEFT: ICD-10-CM

## 2022-01-05 PROCEDURE — 999999 HB NO CHARGE

## 2022-01-11 ENCOUNTER — APPOINTMENT (OUTPATIENT)
Dept: PHYSICAL THERAPY | Facility: REHABILITATION | Age: 62
End: 2022-01-11
Attending: FAMILY MEDICINE
Payer: MEDICARE

## 2022-01-11 ENCOUNTER — OFFICE VISIT (OUTPATIENT)
Dept: CARDIOLOGY | Facility: MEDICAL CENTER | Age: 62
End: 2022-01-11
Payer: MEDICARE

## 2022-01-11 VITALS
RESPIRATION RATE: 16 BRPM | HEIGHT: 61 IN | DIASTOLIC BLOOD PRESSURE: 60 MMHG | HEART RATE: 87 BPM | BODY MASS INDEX: 27.56 KG/M2 | WEIGHT: 146 LBS | OXYGEN SATURATION: 98 % | SYSTOLIC BLOOD PRESSURE: 116 MMHG

## 2022-01-11 DIAGNOSIS — E78.5 DYSLIPIDEMIA: ICD-10-CM

## 2022-01-11 DIAGNOSIS — I20.89 MICROVASCULAR ANGINA (HCC): ICD-10-CM

## 2022-01-11 DIAGNOSIS — E11.00 TYPE 2 DIABETES MELLITUS WITH HYPEROSMOLARITY WITHOUT COMA, WITHOUT LONG-TERM CURRENT USE OF INSULIN (HCC): ICD-10-CM

## 2022-01-11 DIAGNOSIS — I10 ESSENTIAL HYPERTENSION: ICD-10-CM

## 2022-01-11 PROCEDURE — 99214 OFFICE O/P EST MOD 30 MIN: CPT | Performed by: INTERNAL MEDICINE

## 2022-01-11 ASSESSMENT — ENCOUNTER SYMPTOMS
SHORTNESS OF BREATH: 1
WEAKNESS: 0
PARESTHESIAS: 0
PALPITATIONS: 0
WHEEZING: 0
ORTHOPNEA: 0
NAUSEA: 0
DECREASED APPETITE: 0
NUMBNESS: 0
IRREGULAR HEARTBEAT: 0
DIAPHORESIS: 0
VOMITING: 0
ABDOMINAL PAIN: 0
SYNCOPE: 0
DIZZINESS: 0
SLEEP DISTURBANCES DUE TO BREATHING: 0
HEADACHES: 0

## 2022-01-11 ASSESSMENT — FIBROSIS 4 INDEX: FIB4 SCORE: 1.07

## 2022-01-11 NOTE — PROGRESS NOTES
Cardiology Follow-up Consultation Note    Date of note:    1/11/2022    Primary Care Provider: Thomas Garcia M.D.    Name:             Shahida Price     YOB: 1960  MRN:               [id number]    Chief Complaint   Patient presents with   • Chest Pain   • Hypertension       HISTORY OF PRESENT ILLNESS  Ms. Shahida Price is a 61 y.o. Bangladeshi speaking, female who returns to see us for follow-up of microvascular angina, HTN, DM and ongoing chest pain.  Is accompanied by her son.    Last clinic visit: 11/5/2021    Interim History:  Since our last visit, patient was started on Imdur 60 mg daily which she has been taking on a regular basis.  Has noted improvement in the intensity of chest pain but continues to have occasional episodes.  Has associated symptom of dyspnea which is also significantly improved.      Review of Systems   Constitutional: Negative for decreased appetite, diaphoresis and malaise/fatigue.   Cardiovascular: Positive for chest pain. Negative for irregular heartbeat, leg swelling, orthopnea, palpitations and syncope.   Respiratory: Positive for shortness of breath. Negative for sleep disturbances due to breathing and wheezing.    Gastrointestinal: Negative for abdominal pain, nausea and vomiting.   Neurological: Negative for dizziness, headaches, numbness, paresthesias and weakness.       Past Medical History:   Diagnosis Date   • Allergy    • Atopic dermatitis    • Depression    • Diabetes (HCC)    • Gastritis    • Hyperlipidemia    • Hypertension          Past Surgical History:   Procedure Laterality Date   • Z CARDIAC CATH  04/25/2018    normal coronaires   • CHOLECYSTECTOMY  2007   • OTHER ORTHOPEDIC SURGERY  2007    shoulder surgery         Current Outpatient Medications   Medication Sig Dispense Refill   • metoprolol tartrate (LOPRESSOR) 25 MG Tab Take 1 Tablet by mouth 2 times a day. 60 Tablet 3   • meloxicam (MOBIC) 7.5 MG Tab TOME MEGGAN TABLETA TODOS LOS DRAPER 30 Tablet  0   • metFORMIN (GLUCOPHAGE) 500 MG Tab TOME MEGGAN TABLETA DOS VECES AL ROLAND CON LAS COMIDAS 180 Tablet 3   • isosorbide mononitrate SR (IMDUR) 60 MG TABLET SR 24 HR TOME MEGGAN TABLETA TODOS LOS DRAPER EN LA MANANA 90 Tablet 3   • JARDIANCE 10 MG Tab TOME MEGGAN TABLETA TODOS LOS DRAPER 90 Tablet 3   • triamcinolone acetonide (KENALOG) 0.1 % Cream Apply to affected areas twice daily for 14 days 80 g 3   • levothyroxine (SYNTHROID) 25 MCG Tab TAKE 1 TAB BY MOUTH EVERY MORNING ON A EMPTY STOMACH 90 Tablet 3   • atorvastatin (LIPITOR) 20 MG Tab TOME MEGGAN TABLETA TODOS LOS DRAPER 90 Tablet 3   • lisinopril (PRINIVIL) 10 MG Tab TOME MEGGAN TABLETA TODOS LOS DRAPER 90 Tablet 3   • fluticasone (FLONASE) 50 MCG/ACT nasal spray Administer 2 Sprays into affected nostril(S) every day. 16 g 11   • omeprazole (PRILOSEC) 40 MG delayed-release capsule Take 1 Cap by mouth every day. 90 Cap 3   • albuterol 108 (90 Base) MCG/ACT Aero Soln inhalation aerosol Inhale 2 Puffs every 6 hours as needed for Shortness of Breath. 8.5 g 6   • aspirin (ASA) 81 MG Chew Tab chewable tablet Take 1 Tab by mouth every day. 100 Tab 3   • glucose blood (TRUE METRIX BLOOD GLUCOSE TEST) strip USE TO TEST HIGH OR LOW BLOOD SUGAR ONCE A DAY *E11.8* 50 Strip 11   • Blood Glucose Monitoring Suppl (TRUE METRIX METER) w/Device Kit USE ONCE DAILY TO TEST BLOOD SUGAR 1 Kit 0   • TRUEPLUS SAFETY LANCETS 28G Misc        No current facility-administered medications for this visit.         Allergies   Allergen Reactions   • Shellfish Allergy Hives and Rash     Shellfish         Family History   Problem Relation Age of Onset   • Cancer Mother         lung cancer, chronic smoker   • Lung Disease Mother    • Heart Attack Mother    • Diabetes Father          Social History     Socioeconomic History   • Marital status:      Spouse name: Not on file   • Number of children: Not on file   • Years of education: Not on file   • Highest education level: Not on file   Occupational History  "  • Not on file   Tobacco Use   • Smoking status: Former Smoker     Packs/day: 1.00     Years: 15.00     Pack years: 15.00     Quit date:      Years since quittin.0   • Smokeless tobacco: Never Used   Vaping Use   • Vaping Use: Never used   Substance and Sexual Activity   • Alcohol use: Not Currently   • Drug use: Never   • Sexual activity: Yes     Partners: Male     Comment: menopause 2015   Other Topics Concern   • Not on file   Social History Narrative   • Not on file     Social Determinants of Health     Financial Resource Strain:    • Difficulty of Paying Living Expenses: Not on file   Food Insecurity:    • Worried About Running Out of Food in the Last Year: Not on file   • Ran Out of Food in the Last Year: Not on file   Transportation Needs:    • Lack of Transportation (Medical): Not on file   • Lack of Transportation (Non-Medical): Not on file   Physical Activity:    • Days of Exercise per Week: Not on file   • Minutes of Exercise per Session: Not on file   Stress:    • Feeling of Stress : Not on file   Social Connections:    • Frequency of Communication with Friends and Family: Not on file   • Frequency of Social Gatherings with Friends and Family: Not on file   • Attends Mosque Services: Not on file   • Active Member of Clubs or Organizations: Not on file   • Attends Club or Organization Meetings: Not on file   • Marital Status: Not on file   Intimate Partner Violence:    • Fear of Current or Ex-Partner: Not on file   • Emotionally Abused: Not on file   • Physically Abused: Not on file   • Sexually Abused: Not on file   Housing Stability:    • Unable to Pay for Housing in the Last Year: Not on file   • Number of Places Lived in the Last Year: Not on file   • Unstable Housing in the Last Year: Not on file         Physical Exam:  Ambulatory Vitals  /60 (BP Location: Left arm, Patient Position: Sitting, BP Cuff Size: Adult)   Pulse 87   Resp 16   Ht 1.549 m (5' 1\")   Wt 66.2 kg (146 lb)  "  SpO2 98%    Oxygen Therapy:  Pulse Oximetry: 98 %  BP Readings from Last 4 Encounters:   01/11/22 116/60   12/02/21 128/70   11/05/21 122/78   10/28/21 104/58       Weight/BMI: Body mass index is 27.59 kg/m².  Wt Readings from Last 4 Encounters:   01/11/22 66.2 kg (146 lb)   12/02/21 65.6 kg (144 lb 9.6 oz)   11/05/21 66.7 kg (147 lb)   10/28/21 67.1 kg (148 lb)       GEN: Well developed, well nourished and in no acute distress.  HEART: no significant JVD, regular rate and rhythm, normal S1 and S2, no murmurs, no third heart sounds, normal cardiac palpation  LUNG: clear to auscultation bilaterally, no wheezing, no crackles, normal respiratory effort on room air  ABDOMEN: soft, non-tender, non-distended, normal bowel sounds throughout  EXTREMITIES: no peripheral edema noted  VASCULAR: no significantly elevated jugular venous pressure, no carotid bruits noted, radial pulses 2+ and equal      Lab Data Review:  Lab Results   Component Value Date/Time    CHOLSTRLTOT 131 03/04/2021 10:26 AM    LDL 63 03/04/2021 10:26 AM    HDL 47 03/04/2021 10:26 AM    TRIGLYCERIDE 107 03/04/2021 10:26 AM       Lab Results   Component Value Date/Time    SODIUM 136 03/04/2021 10:26 AM    POTASSIUM 4.4 03/04/2021 10:26 AM    CHLORIDE 102 03/04/2021 10:26 AM    CO2 25 03/04/2021 10:26 AM    GLUCOSE 108 (H) 03/04/2021 10:26 AM    BUN 14 03/04/2021 10:26 AM    CREATININE 0.67 03/04/2021 10:26 AM     Lab Results   Component Value Date/Time    ALKPHOSPHAT 96 03/04/2021 10:26 AM    ASTSGOT 21 03/04/2021 10:26 AM    ALTSGPT 26 03/04/2021 10:26 AM    TBILIRUBIN 0.3 03/04/2021 10:26 AM      Lab Results   Component Value Date/Time    WBC 6.0 03/04/2021 10:26 AM     Lab Results   Component Value Date/Time    HBA1C 7.1 (H) 12/01/2021 11:20 AM    HBA1C 6.6 (A) 01/20/2021 11:17 AM       Cardiac Imaging and Procedures Review:    EKG dated 2/8/2021: My personal interpretation is sinus rhythm        Assessment & Plan     1. Microvascular angina (HCC)   metoprolol tartrate (LOPRESSOR) 25 MG Tab   2. Essential hypertension  metoprolol tartrate (LOPRESSOR) 25 MG Tab   3. Dyslipidemia  metoprolol tartrate (LOPRESSOR) 25 MG Tab   4. Type 2 diabetes mellitus with hyperosmolarity without coma, without long-term current use of insulin (HCC)  metoprolol tartrate (LOPRESSOR) 25 MG Tab       Symptoms have improved since being on Imdur 60 mg daily but are still present.  Initiate metoprolol tartrate 25 mg twice daily for antianginal properties and to relieve ongoing symptoms.  We will continue Imdur 60 mg for now.    Did not receive coronary angiogram results from patient's previous cardiologist which were requested last visit.  Will refax the request again today.    Blood pressure well controlled.  Continue lisinopril 10 mg daily.    Lipid panel reviewed.  LDL within goal.  Continue atorvastatin 20 mg daily.    Continue aspirin 81 mg daily for microvascular angina.      All of patient and son's excellent questions were answered to the best of my knowledge and to their satisfaction.  It was a pleasure seeing Ms. Shahida Price in my clinic today. Return in about 3 months (around 4/11/2022). Patient is aware to call the cardiology clinic with any questions or concerns.      Lew Hooks MD  [facility]  [facility]  [address] [address]  Phone: [phone]  Fax: [phone]    Please note that this dictation was created using voice recognition software. I have made every reasonable attempt to correct obvious errors, but it is possible there are errors of grammar and possibly content that I did not discover before finalizing the note.

## 2022-01-13 ENCOUNTER — APPOINTMENT (OUTPATIENT)
Dept: PHYSICAL THERAPY | Facility: REHABILITATION | Age: 62
End: 2022-01-13
Attending: FAMILY MEDICINE
Payer: MEDICARE

## 2022-01-18 ENCOUNTER — APPOINTMENT (OUTPATIENT)
Dept: PHYSICAL THERAPY | Facility: REHABILITATION | Age: 62
End: 2022-01-18
Attending: FAMILY MEDICINE
Payer: MEDICARE

## 2022-01-20 ENCOUNTER — APPOINTMENT (OUTPATIENT)
Dept: PHYSICAL THERAPY | Facility: REHABILITATION | Age: 62
End: 2022-01-20
Attending: FAMILY MEDICINE
Payer: MEDICARE

## 2022-01-20 RX ORDER — OMEPRAZOLE 40 MG/1
CAPSULE, DELAYED RELEASE ORAL
Qty: 90 CAPSULE | Refills: 3 | Status: SHIPPED | OUTPATIENT
Start: 2022-01-20 | End: 2023-03-28

## 2022-01-25 ENCOUNTER — APPOINTMENT (OUTPATIENT)
Dept: PHYSICAL THERAPY | Facility: REHABILITATION | Age: 62
End: 2022-01-25
Attending: FAMILY MEDICINE
Payer: MEDICARE

## 2022-01-27 ENCOUNTER — APPOINTMENT (OUTPATIENT)
Dept: PHYSICAL THERAPY | Facility: REHABILITATION | Age: 62
End: 2022-01-27
Attending: FAMILY MEDICINE
Payer: MEDICARE

## 2022-01-30 DIAGNOSIS — M25.552 HIP PAIN, CHRONIC, LEFT: ICD-10-CM

## 2022-01-30 DIAGNOSIS — G89.29 HIP PAIN, CHRONIC, LEFT: ICD-10-CM

## 2022-01-31 RX ORDER — MELOXICAM 7.5 MG/1
TABLET ORAL
Qty: 30 TABLET | Refills: 0 | Status: SHIPPED | OUTPATIENT
Start: 2022-01-31 | End: 2022-03-07

## 2022-02-01 ENCOUNTER — APPOINTMENT (OUTPATIENT)
Dept: PHYSICAL THERAPY | Facility: REHABILITATION | Age: 62
End: 2022-02-01
Attending: FAMILY MEDICINE
Payer: MEDICARE

## 2022-02-03 ENCOUNTER — APPOINTMENT (OUTPATIENT)
Dept: PHYSICAL THERAPY | Facility: REHABILITATION | Age: 62
End: 2022-02-03
Attending: FAMILY MEDICINE
Payer: MEDICARE

## 2022-02-04 DIAGNOSIS — E11.00 TYPE 2 DIABETES MELLITUS WITH HYPEROSMOLARITY WITHOUT COMA, WITHOUT LONG-TERM CURRENT USE OF INSULIN (HCC): ICD-10-CM

## 2022-02-04 DIAGNOSIS — I10 ESSENTIAL HYPERTENSION: ICD-10-CM

## 2022-02-04 DIAGNOSIS — E78.5 DYSLIPIDEMIA: ICD-10-CM

## 2022-02-04 DIAGNOSIS — I20.89 MICROVASCULAR ANGINA (HCC): ICD-10-CM

## 2022-03-05 DIAGNOSIS — M25.552 HIP PAIN, CHRONIC, LEFT: ICD-10-CM

## 2022-03-05 DIAGNOSIS — G89.29 HIP PAIN, CHRONIC, LEFT: ICD-10-CM

## 2022-03-07 RX ORDER — MELOXICAM 7.5 MG/1
TABLET ORAL
Qty: 30 TABLET | Refills: 0 | Status: SHIPPED | OUTPATIENT
Start: 2022-03-07 | End: 2022-04-04

## 2022-03-10 ENCOUNTER — OFFICE VISIT (OUTPATIENT)
Dept: MEDICAL GROUP | Facility: PHYSICIAN GROUP | Age: 62
End: 2022-03-10
Payer: MEDICARE

## 2022-03-10 VITALS
HEIGHT: 61 IN | BODY MASS INDEX: 28.32 KG/M2 | SYSTOLIC BLOOD PRESSURE: 120 MMHG | DIASTOLIC BLOOD PRESSURE: 68 MMHG | OXYGEN SATURATION: 97 % | TEMPERATURE: 98.5 F | HEART RATE: 85 BPM | WEIGHT: 150 LBS

## 2022-03-10 DIAGNOSIS — L20.82 FLEXURAL ECZEMA: ICD-10-CM

## 2022-03-10 DIAGNOSIS — M54.6 CHRONIC RIGHT-SIDED THORACIC BACK PAIN: ICD-10-CM

## 2022-03-10 DIAGNOSIS — E11.00 TYPE 2 DIABETES MELLITUS WITH HYPEROSMOLARITY WITHOUT COMA, WITHOUT LONG-TERM CURRENT USE OF INSULIN (HCC): ICD-10-CM

## 2022-03-10 DIAGNOSIS — G89.29 HIP PAIN, CHRONIC, LEFT: ICD-10-CM

## 2022-03-10 DIAGNOSIS — M25.552 HIP PAIN, CHRONIC, LEFT: ICD-10-CM

## 2022-03-10 DIAGNOSIS — L98.9 SKIN LESION: ICD-10-CM

## 2022-03-10 DIAGNOSIS — G89.29 CHRONIC RIGHT-SIDED THORACIC BACK PAIN: ICD-10-CM

## 2022-03-10 PROCEDURE — 99214 OFFICE O/P EST MOD 30 MIN: CPT | Performed by: FAMILY MEDICINE

## 2022-03-10 RX ORDER — TRIAMCINOLONE ACETONIDE 1 MG/G
CREAM TOPICAL
Qty: 80 G | Refills: 3 | Status: SHIPPED | OUTPATIENT
Start: 2022-03-10

## 2022-03-10 ASSESSMENT — PATIENT HEALTH QUESTIONNAIRE - PHQ9: CLINICAL INTERPRETATION OF PHQ2 SCORE: 0

## 2022-03-10 ASSESSMENT — FIBROSIS 4 INDEX: FIB4 SCORE: 1.07

## 2022-03-10 NOTE — PROGRESS NOTES
Subjective:     Chief Complaint   Patient presents with   • Follow-Up     Unable to go to Physical Therapy die to copayment difficulties   • Referral Needed     Derm,    • Medication Refill     triamcinolone acetonide (KENALOG) 0.1 % Cream       HPI:   Shahida presents today to discuss the following.    Chronic right-sided thoracic back pain  Chronic issue  Suffers from right upper back pain especially due to prolonged standing.  On meloxicam and robaxin.     Hip pain, chronic, left  Chronic issue  Longstanding hx of this  meloxicam and robaxin      Past Medical History:   Diagnosis Date   • Allergy    • Atopic dermatitis    • Depression    • Diabetes (HCC)    • Gastritis    • Hyperlipidemia    • Hypertension        Current Outpatient Medications Ordered in Epic   Medication Sig Dispense Refill   • triamcinolone acetonide (KENALOG) 0.1 % Cream Apply to affected areas twice daily for 14 days 80 g 3   • meloxicam (MOBIC) 7.5 MG Tab TOME MEGGAN TABLETA TODOS LOS DRAPER 30 Tablet 0   • metoprolol tartrate (LOPRESSOR) 25 MG Tab TOME MEGGAN TABLETA DOS VECES AL ROLAND 180 Tablet 3   • omeprazole (PRILOSEC) 40 MG delayed-release capsule TOME MEGGAN CAPSULA TODOS LOS DRAPER 90 Capsule 3   • albuterol 108 (90 Base) MCG/ACT Aero Soln inhalation aerosol INHALE 2 PUFFS BY MOUTH EVERY 6 HOURS AS NEEDED FOR SHORTNESS OF BREATH 8.5 Each 6   • metFORMIN (GLUCOPHAGE) 500 MG Tab TOME MEGGAN TABLETA DOS VECES AL ROLAND CON LAS COMIDAS 180 Tablet 3   • isosorbide mononitrate SR (IMDUR) 60 MG TABLET SR 24 HR TOME MEGGAN TABLETA TODOS LOS DRAPER EN LA MANANA 90 Tablet 3   • JARDIANCE 10 MG Tab TOME MEGGAN TABLETA TODOS LOS DRAPER 90 Tablet 3   • levothyroxine (SYNTHROID) 25 MCG Tab TAKE 1 TAB BY MOUTH EVERY MORNING ON A EMPTY STOMACH 90 Tablet 3   • atorvastatin (LIPITOR) 20 MG Tab TOME MEGGAN TABLETA TODOS LOS DRAPER 90 Tablet 3   • lisinopril (PRINIVIL) 10 MG Tab TOME MEGGAN TABLETA TODOS LOS DRAPER 90 Tablet 3   • glucose blood (TRUE METRIX BLOOD GLUCOSE TEST) strip USE TO TEST  "HIGH OR LOW BLOOD SUGAR ONCE A DAY *E11.8* 50 Strip 11   • fluticasone (FLONASE) 50 MCG/ACT nasal spray Administer 2 Sprays into affected nostril(S) every day. 16 g 11   • aspirin (ASA) 81 MG Chew Tab chewable tablet Take 1 Tab by mouth every day. 100 Tab 3   • Blood Glucose Monitoring Suppl (TRUE METRIX METER) w/Device Kit USE ONCE DAILY TO TEST BLOOD SUGAR 1 Kit 0   • TRUEPLUS SAFETY LANCETS 28G Misc        No current Epic-ordered facility-administered medications on file.       Allergies:  Shellfish allergy    Health Maintenance: Completed    ROS:  Gen: no fevers/chills, no changes in weight  Eyes: no changes in vision  Pulm: no sob, no cough  CV: no chest pain, no palpitations  GI: no nausea/vomiting, no diarrhea      Objective:     Exam:  /68 (BP Location: Left arm, Patient Position: Sitting, BP Cuff Size: Adult)   Pulse 85   Temp 36.9 °C (98.5 °F) (Temporal)   Ht 1.549 m (5' 1\")   Wt 68 kg (150 lb)   SpO2 97%   BMI 28.34 kg/m²  Body mass index is 28.34 kg/m².      Constitutional: Alert, no distress, well-groomed.  Skin: Warm, dry, good turgor, no rashes in visible areas.  Eye: Equal, round and reactive, conjunctiva clear, lids normal.  ENMT: Lips without lesions, good dentition, moist mucous membranes.  Neck: Trachea midline, no masses, no thyromegaly.  Respiratory: Unlabored respiratory effort, no cough.  MSK: Normal gait, moves all extremities.  Neuro: Grossly non-focal.   Psych: Alert and oriented x3, normal affect and mood.        Assessment & Plan:     61 y.o. female with the following -     1. Chronic right-sided thoracic back pain  Chronic, controlled condition.  At this time I recommend continuing with meloxicam and Robaxin as needed.  Patient would like to hold off on physical therapy at this time.      2. Hip pain, chronic, left  I suspect this trochanteric bursitis.  The patient had a painful injection in the past and is scared of going through the same pain.  I would like to refer to " sports medicine for ongoing management.  - Referral to Sports Medicine    3. Flexural eczema  - triamcinolone acetonide (KENALOG) 0.1 % Cream; Apply to affected areas twice daily for 14 days  Dispense: 80 g; Refill: 3    4. Skin lesion  - Referral to Dermatology    5. Type 2 diabetes mellitus with hyperosmolarity without coma, without long-term current use of insulin (HCC)  Chronic, stable condition.  Continue with current regimen.  Repeat A1c in 3 months.  - HEMOGLOBIN A1C; Future      Return in about 3 months (around 6/10/2022).    Please note that this dictation was created using voice recognition software. I have made every reasonable attempt to correct obvious errors, but I expect that there are errors of grammar and possibly content that I did not discover before finalizing the note.

## 2022-03-10 NOTE — ASSESSMENT & PLAN NOTE
Chronic issue  Suffers from right upper back pain especially due to prolonged standing.  On meloxicam and robaxin.

## 2022-03-23 ENCOUNTER — TELEPHONE (OUTPATIENT)
Dept: SPORTS MEDICINE | Facility: CLINIC | Age: 62
End: 2022-03-23

## 2022-03-23 NOTE — TELEPHONE ENCOUNTER
The patient called in regards to the recent visit and stated the you were going to order the patient an MRI of the right ankle. I checked the chart and I did not see the order. Please advise.    Deedee

## 2022-05-16 DIAGNOSIS — G89.29 HIP PAIN, CHRONIC, LEFT: ICD-10-CM

## 2022-05-16 DIAGNOSIS — M25.552 HIP PAIN, CHRONIC, LEFT: ICD-10-CM

## 2022-05-16 RX ORDER — METHOCARBAMOL 750 MG/1
TABLET, FILM COATED ORAL
Qty: 56 TABLET | Refills: 0 | Status: SHIPPED | OUTPATIENT
Start: 2022-05-16

## 2022-06-01 ENCOUNTER — TELEPHONE (OUTPATIENT)
Dept: MEDICAL GROUP | Facility: PHYSICIAN GROUP | Age: 62
End: 2022-06-01
Payer: MEDICARE

## 2022-06-02 NOTE — TELEPHONE ENCOUNTER
Rachel with [facility] called asking if we received fax concerning a statin med form and asking if we received it. Please advise....

## 2022-06-16 ENCOUNTER — APPOINTMENT (OUTPATIENT)
Dept: MEDICAL GROUP | Facility: PHYSICIAN GROUP | Age: 62
End: 2022-06-16
Payer: MEDICARE

## 2022-07-07 ENCOUNTER — TELEPHONE (OUTPATIENT)
Dept: MEDICAL GROUP | Facility: PHYSICIAN GROUP | Age: 62
End: 2022-07-07
Payer: MEDICARE

## 2022-07-07 NOTE — TELEPHONE ENCOUNTER
1. Caller Name: Lu PERRI ramos [facility]                           Call Back Number: [phone] ext [phone]      How would the patient prefer to be contacted with a response: Phone call OK to leave a detailed message    MED MANAGMENT      Lu with [facility] called requesting Clarification if Patient is still currently on Statin therapy. There is a medication for Atorvastatin 20 MG on patient chart however I am not sure if she is still taking.     Please advise

## 2022-11-11 ENCOUNTER — PATIENT MESSAGE (OUTPATIENT)
Dept: HEALTH INFORMATION MANAGEMENT | Facility: OTHER | Age: 62
End: 2022-11-11

## 2022-11-18 DIAGNOSIS — I10 ESSENTIAL HYPERTENSION: ICD-10-CM

## 2022-11-18 DIAGNOSIS — E78.5 DYSLIPIDEMIA: ICD-10-CM

## 2022-11-18 DIAGNOSIS — E11.8 TYPE 2 DIABETES MELLITUS WITH COMPLICATION, WITHOUT LONG-TERM CURRENT USE OF INSULIN (HCC): ICD-10-CM

## 2022-11-22 RX ORDER — LISINOPRIL 10 MG/1
TABLET ORAL
Qty: 90 TABLET | Refills: 3 | Status: SHIPPED | OUTPATIENT
Start: 2022-11-22

## 2022-11-23 DIAGNOSIS — E78.5 DYSLIPIDEMIA: ICD-10-CM

## 2022-11-23 DIAGNOSIS — E11.00 TYPE 2 DIABETES MELLITUS WITH HYPEROSMOLARITY WITHOUT COMA, WITHOUT LONG-TERM CURRENT USE OF INSULIN (HCC): ICD-10-CM

## 2022-11-29 RX ORDER — ATORVASTATIN CALCIUM 20 MG/1
TABLET, FILM COATED ORAL
Qty: 90 TABLET | Refills: 3 | Status: SHIPPED | OUTPATIENT
Start: 2022-11-29

## 2022-12-12 RX ORDER — ALBUTEROL SULFATE 90 UG/1
AEROSOL, METERED RESPIRATORY (INHALATION)
Qty: 8.5 EACH | Refills: 1 | Status: SHIPPED | OUTPATIENT
Start: 2022-12-12

## 2023-03-28 RX ORDER — OMEPRAZOLE 40 MG/1
CAPSULE, DELAYED RELEASE ORAL
Qty: 90 CAPSULE | Refills: 0 | Status: SHIPPED | OUTPATIENT
Start: 2023-03-28

## 2023-03-28 NOTE — TELEPHONE ENCOUNTER
Received request via: Pharmacy    Was the patient seen in the last year in this department? Yes    Does the patient have an active prescription (recently filled or refills available) for medication(s) requested? No    Does the patient have alf Plus and need 100 day supply (blood pressure, diabetes and cholesterol meds only)? N/a

## 2023-09-08 ENCOUNTER — DOCUMENTATION (OUTPATIENT)
Dept: HEALTH INFORMATION MANAGEMENT | Facility: OTHER | Age: 63
End: 2023-09-08
Payer: MEDICARE

## 2024-02-12 ENCOUNTER — TELEPHONE (OUTPATIENT)
Dept: HEALTH INFORMATION MANAGEMENT | Facility: OTHER | Age: 64
End: 2024-02-12
Payer: MEDICARE
